# Patient Record
Sex: FEMALE | Race: WHITE | Employment: FULL TIME | URBAN - METROPOLITAN AREA
[De-identification: names, ages, dates, MRNs, and addresses within clinical notes are randomized per-mention and may not be internally consistent; named-entity substitution may affect disease eponyms.]

---

## 2017-12-20 ENCOUNTER — TELEPHONE (OUTPATIENT)
Dept: TRANSPLANT | Facility: CLINIC | Age: 32
End: 2017-12-20

## 2017-12-20 DIAGNOSIS — Z00.5 TRANSPLANT DONOR EVALUATION: Primary | ICD-10-CM

## 2017-12-20 NOTE — TELEPHONE ENCOUNTER
"[facility] BREEZE    [id number]     LIVING KIDNEY DONOR EVALUATION  Donor First Name Elle Donor MRN    Donor Last Name Sergio Completed 2017 8:35 PM   1985 Record ID [id number]  BREEZE Screen PASSED      Intended Recipient  Recipient First Name Colin Recipient MRN    Recipient Last Name Sergio Relationship Step Parent  Recipient  2002 Recipient Diagnosis    Recipient's ABO        Donor Information  Age 32 Gender Female  Height 5' 5'' Race   Weight 178 Ethnicity Not /  BMI 29.6 Preferred Language English       Required No      Blood Type O  Demographics  Home Address [address] [place] [address] # [phone]  City [place] Type Mobile  State [place] Alternate #    Zip Code [address] Type    Country [place] Preferred Contact day Mon, Fri, Thur, Wed, Tue  Email [email or web address] Preferred Contact time 1:00 PM-4:00 PM  Donor's Medical Information  Medical History Depression   Hypothyroidism Medications Fish Oil   Multivitamin   Synthroid   Wellbutrin  Surgical History Partial Thyroidectomy Allergies NKDA  Social History EtOH: Occasional (1-2 drinks/week)   Illicit Drug Use: Denies   Tobacco: Denies Self-Reported Functional Status \"I am able to participate in strenuous sports such as swimming, singles tennis, football, basketball, or skiing\"  Family Medical History Cancer (denies)   Diabetes (Grandparent)   Heart Disease (denies)   Hypertension (denies)   Kidney Disease (denies)   Kidney Stones (denies) Exercise Frequency Exercise (>3X per week)  Review of Organ Systems  Review of Systems Airway or Lungs: No   Blood Disorder: No   Cancer: No   Diabetes,Thyroid,Adrenal,Endocrine Disorder: Yes   Digestive or Liver: No   Female Health: No   Heart or Circulatory System: No   Immune Diseases: No   Kidneys and Bladder: No   Muscles,Bones,Joints: No   Neuro: No   Psych: Yes  Donor's Social Information  Marital Status  Living Accommodation Owns own " home/apartment  Level of 's or technical degree complete Living Arrangement With spouse  Employment Status Full Time Concerns: health and life insurance No  Employer [employer]  Home Concerns: job security and lost income No  Occupation        Medical Insurance Status Has medical insurance      High Risk Behavior  High Risk Behaviors Blood transfusion < 12 months. (NO)   Commercial sex < 12 months. (NO)   Illicit IV drug use < 5yrs. (NO)   Other high risk sexual contact < 12 months. (NO)  Reason for Donation  Referral Friend or Family of Tx Candidate Reason for Donation My step-son Colin Hill will need a new kidney in the near future and I would be honored to give him my kidney to prolong his life.  Permission to Disclose Inquiry Yes Patient Comments    Donor Motivation Level Highly motivated donor      PCP Contact  PCP Name Taylor Escobedo CNP  PCP [facility]  PCP [facility]  PCP Phone [phone]  Emergency Contact  First Name Ab First Name Nelly  Last Name Sergio Last Name Huong  Phone # [phone] Phone # [phone]  Phone Type   Phone Type    Relationship Spouse Relationship Sibling

## 2018-01-26 ENCOUNTER — APPOINTMENT (OUTPATIENT)
Dept: LAB | Facility: CLINIC | Age: 33
End: 2018-01-26
Payer: COMMERCIAL

## 2018-01-26 DIAGNOSIS — Z00.5 ENCOUNTER FOR EXAMINATION OF POTENTIAL DONOR OF ORGAN AND TISSUE: ICD-10-CM

## 2018-01-26 LAB
ABO GROUP (TYPE) IN BLOOD: NORMAL
BACTERIA #/AREA URNS HPF: NORMAL /HPF
BILIRUB UR QL: NEGATIVE
CLARITY UR: CLEAR
COLOR UR: YELLOW
CREAT SERPL-MCNC: 0.8 MG/DL (ref 0.6–1.2)
D AG BLD QL: NORMAL
GFR SERPL CREATININE-BSD FRML MDRD: 83 ML/MIN/1.73M*2
GLUCOSE SERPL-MCNC: 93 MG/DL (ref 70–105)
GLUCOSE UR QL: NEGATIVE MG/DL
HGB BLD-MCNC: 13.1 G/DL (ref 11.5–15.5)
HGB UR QL: NEGATIVE
KETONES UR-MCNC: NEGATIVE MG/DL
LEUKOCYTE ESTERASE UR QL STRIP: NEGATIVE
MICROALBUMIN UR-MCNC: <7 MG/L (ref 0–30)
NITRITE UR QL: NEGATIVE
PH UR: 5.5 PH
PROT UR STRIP-MCNC: NEGATIVE MG/DL
RBC #/AREA URNS HPF: NORMAL /HPF
SP GR UR: <=1.005 (ref 1–1.03)
SQUAMOUS #/AREA URNS HPF: NORMAL /HPF
UROBILINOGEN UR-MCNC: 0.2 E.U./DL
WBC #/AREA URNS HPF: NORMAL /HPF

## 2018-01-26 PROCEDURE — 82565 ASSAY OF CREATININE: CPT | Performed by: INTERNAL MEDICINE

## 2018-01-26 PROCEDURE — 82043 UR ALBUMIN QUANTITATIVE: CPT | Performed by: INTERNAL MEDICINE

## 2018-01-26 PROCEDURE — 85018 HEMOGLOBIN: CPT | Performed by: INTERNAL MEDICINE

## 2018-01-26 PROCEDURE — 81001 URINALYSIS AUTO W/SCOPE: CPT | Performed by: INTERNAL MEDICINE

## 2018-01-26 PROCEDURE — 36415 COLL VENOUS BLD VENIPUNCTURE: CPT | Performed by: INTERNAL MEDICINE

## 2018-01-26 PROCEDURE — 82947 ASSAY GLUCOSE BLOOD QUANT: CPT | Performed by: INTERNAL MEDICINE

## 2018-02-13 ENCOUNTER — DOCUMENTATION ONLY (OUTPATIENT)
Dept: TRANSPLANT | Facility: CLINIC | Age: 33
End: 2018-02-13

## 2018-02-20 ENCOUNTER — TELEPHONE (OUTPATIENT)
Dept: TRANSPLANT | Facility: CLINIC | Age: 33
End: 2018-02-20

## 2018-02-20 NOTE — TELEPHONE ENCOUNTER
Family talked things over and Elle wants to come for eval on 3/9. [place] Has not left [place] in past 3 months. Has CD.

## 2018-02-22 DIAGNOSIS — Z00.5 TRANSPLANT DONOR EVALUATION: ICD-10-CM

## 2018-03-09 ENCOUNTER — INFUSION THERAPY VISIT (OUTPATIENT)
Dept: INFUSION THERAPY | Facility: CLINIC | Age: 33
End: 2018-03-09
Attending: INTERNAL MEDICINE

## 2018-03-09 ENCOUNTER — OFFICE VISIT (OUTPATIENT)
Dept: TRANSPLANT | Facility: CLINIC | Age: 33
End: 2018-03-09
Attending: TRANSPLANT SURGERY

## 2018-03-09 ENCOUNTER — RADIANT APPOINTMENT (OUTPATIENT)
Dept: GENERAL RADIOLOGY | Facility: CLINIC | Age: 33
End: 2018-03-09
Attending: INTERNAL MEDICINE

## 2018-03-09 ENCOUNTER — ALLIED HEALTH/NURSE VISIT (OUTPATIENT)
Dept: TRANSPLANT | Facility: CLINIC | Age: 33
End: 2018-03-09
Attending: TRANSPLANT SURGERY

## 2018-03-09 ENCOUNTER — DOCUMENTATION ONLY (OUTPATIENT)
Dept: TRANSPLANT | Facility: CLINIC | Age: 33
End: 2018-03-09

## 2018-03-09 ENCOUNTER — OFFICE VISIT (OUTPATIENT)
Dept: NEPHROLOGY | Facility: CLINIC | Age: 33
End: 2018-03-09
Attending: TRANSPLANT SURGERY

## 2018-03-09 ENCOUNTER — RADIANT APPOINTMENT (OUTPATIENT)
Dept: CT IMAGING | Facility: CLINIC | Age: 33
End: 2018-03-09
Attending: INTERNAL MEDICINE

## 2018-03-09 VITALS
WEIGHT: 183.5 LBS | HEIGHT: 65 IN | RESPIRATION RATE: 20 BRPM | DIASTOLIC BLOOD PRESSURE: 71 MMHG | SYSTOLIC BLOOD PRESSURE: 118 MMHG | OXYGEN SATURATION: 100 % | BODY MASS INDEX: 30.57 KG/M2 | HEART RATE: 77 BPM | TEMPERATURE: 97.9 F

## 2018-03-09 VITALS — SYSTOLIC BLOOD PRESSURE: 107 MMHG | DIASTOLIC BLOOD PRESSURE: 72 MMHG

## 2018-03-09 VITALS — SYSTOLIC BLOOD PRESSURE: 110 MMHG | DIASTOLIC BLOOD PRESSURE: 71 MMHG

## 2018-03-09 DIAGNOSIS — Z00.5 TRANSPLANT DONOR EVALUATION: Primary | ICD-10-CM

## 2018-03-09 DIAGNOSIS — Z00.5 TRANSPLANT DONOR EVALUATION: ICD-10-CM

## 2018-03-09 LAB
ABO + RH BLD: NORMAL
ABO + RH BLD: NORMAL
ALBUMIN SERPL-MCNC: 3.8 G/DL (ref 3.4–5)
ALBUMIN UR-MCNC: NEGATIVE MG/DL
ALP SERPL-CCNC: 45 U/L (ref 40–150)
ALT SERPL W P-5'-P-CCNC: 34 U/L (ref 0–50)
ANION GAP SERPL CALCULATED.3IONS-SCNC: 6 MMOL/L (ref 3–14)
APPEARANCE UR: CLEAR
APTT PPP: 27 SEC (ref 22–37)
AST SERPL W P-5'-P-CCNC: 29 U/L (ref 0–45)
B-HCG SERPL-ACNC: <1 IU/L (ref 0–5)
BILIRUB SERPL-MCNC: 0.6 MG/DL (ref 0.2–1.3)
BILIRUB UR QL STRIP: NEGATIVE
BLD GP AB SCN SERPL QL: NORMAL
BLOOD BANK CMNT PATIENT-IMP: NORMAL
BUN SERPL-MCNC: 15 MG/DL (ref 7–30)
C3 SERPL-MCNC: 90 MG/DL (ref 76–169)
C4 SERPL-MCNC: 17 MG/DL (ref 15–50)
CALCIUM SERPL-MCNC: 8.6 MG/DL (ref 8.5–10.1)
CHLORIDE SERPL-SCNC: 103 MMOL/L (ref 94–109)
CHOLEST SERPL-MCNC: 135 MG/DL
CMV IGG SERPL QL IA: 5.5 AI (ref 0–0.8)
CO2 SERPL-SCNC: 27 MMOL/L (ref 20–32)
COLOR UR AUTO: COLORLESS
CREAT SERPL-MCNC: 0.78 MG/DL (ref 0.52–1.04)
CREAT UR-MCNC: 20 MG/DL
EBV VCA IGG SER QL IA: 7.3 AI (ref 0–0.8)
ERYTHROCYTE [DISTWIDTH] IN BLOOD BY AUTOMATED COUNT: 12.4 % (ref 10–15)
GFR SERPL CREATININE-BSD FRML MDRD: 86 ML/MIN/1.7M2
GLUCOSE SERPL-MCNC: 82 MG/DL (ref 70–99)
GLUCOSE UR STRIP-MCNC: NEGATIVE MG/DL
HBA1C MFR BLD: 5.3 % (ref 4.3–6)
HBV CORE AB SERPL QL IA: NONREACTIVE
HBV SURFACE AB SERPL IA-ACNC: 39.98 M[IU]/ML
HBV SURFACE AG SERPL QL IA: NONREACTIVE
HCT VFR BLD AUTO: 38.4 % (ref 35–47)
HCV AB SERPL QL IA: NONREACTIVE
HDLC SERPL-MCNC: 66 MG/DL
HGB BLD-MCNC: 12.7 G/DL (ref 11.7–15.7)
HGB UR QL STRIP: NEGATIVE
HIV 1+2 AB+HIV1 P24 AG SERPL QL IA: NONREACTIVE
INR PPP: 1.01 (ref 0.86–1.14)
INTERPRETATION ECG - MUSE: NORMAL
KETONES UR STRIP-MCNC: NEGATIVE MG/DL
LDLC SERPL CALC-MCNC: 63 MG/DL
LEUKOCYTE ESTERASE UR QL STRIP: NEGATIVE
MCH RBC QN AUTO: 31.8 PG (ref 26.5–33)
MCHC RBC AUTO-ENTMCNC: 33.1 G/DL (ref 31.5–36.5)
MCV RBC AUTO: 96 FL (ref 78–100)
MICROALBUMIN UR-MCNC: <5 MG/L
MICROALBUMIN/CREAT UR: NORMAL MG/G CR (ref 0–25)
MUCOUS THREADS #/AREA URNS LPF: PRESENT /LPF
NITRATE UR QL: NEGATIVE
NONHDLC SERPL-MCNC: 69 MG/DL
PH UR STRIP: 6 PH (ref 5–7)
PHOSPHATE SERPL-MCNC: 3.2 MG/DL (ref 2.5–4.5)
PLATELET # BLD AUTO: 247 10E9/L (ref 150–450)
POTASSIUM SERPL-SCNC: 4 MMOL/L (ref 3.4–5.3)
PROT SERPL-MCNC: 7.1 G/DL (ref 6.8–8.8)
PROT UR-MCNC: <0.05 G/L
PROT/CREAT 24H UR: NORMAL G/G CR (ref 0–0.2)
RBC # BLD AUTO: 3.99 10E12/L (ref 3.8–5.2)
RBC #/AREA URNS AUTO: 0 /HPF (ref 0–2)
SODIUM SERPL-SCNC: 137 MMOL/L (ref 133–144)
SOURCE: ABNORMAL
SP GR UR STRIP: 1 (ref 1–1.03)
SPECIMEN EXP DATE BLD: NORMAL
T PALLIDUM IGG+IGM SER QL: NEGATIVE
TRIGL SERPL-MCNC: 32 MG/DL
URATE SERPL-MCNC: 5 MG/DL (ref 2.6–6)
UROBILINOGEN UR STRIP-MCNC: 0 MG/DL (ref 0–2)
WBC # BLD AUTO: 6.2 10E9/L (ref 4–11)
WBC #/AREA URNS AUTO: 0 /HPF (ref 0–5)

## 2018-03-09 PROCEDURE — 86900 BLOOD TYPING SEROLOGIC ABO: CPT | Performed by: INTERNAL MEDICINE

## 2018-03-09 PROCEDURE — 85730 THROMBOPLASTIN TIME PARTIAL: CPT | Performed by: INTERNAL MEDICINE

## 2018-03-09 PROCEDURE — 82542 COL CHROMOTOGRAPHY QUAL/QUAN: CPT | Performed by: INTERNAL MEDICINE

## 2018-03-09 PROCEDURE — 84156 ASSAY OF PROTEIN URINE: CPT | Performed by: INTERNAL MEDICINE

## 2018-03-09 PROCEDURE — 87340 HEPATITIS B SURFACE AG IA: CPT | Performed by: INTERNAL MEDICINE

## 2018-03-09 PROCEDURE — 86160 COMPLEMENT ANTIGEN: CPT | Performed by: INTERNAL MEDICINE

## 2018-03-09 PROCEDURE — 86901 BLOOD TYPING SEROLOGIC RH(D): CPT | Performed by: INTERNAL MEDICINE

## 2018-03-09 PROCEDURE — 85027 COMPLETE CBC AUTOMATED: CPT | Performed by: INTERNAL MEDICINE

## 2018-03-09 PROCEDURE — 86038 ANTINUCLEAR ANTIBODIES: CPT | Performed by: INTERNAL MEDICINE

## 2018-03-09 PROCEDURE — 86704 HEP B CORE ANTIBODY TOTAL: CPT | Performed by: INTERNAL MEDICINE

## 2018-03-09 PROCEDURE — 80053 COMPREHEN METABOLIC PANEL: CPT | Performed by: INTERNAL MEDICINE

## 2018-03-09 PROCEDURE — 83036 HEMOGLOBIN GLYCOSYLATED A1C: CPT | Performed by: INTERNAL MEDICINE

## 2018-03-09 PROCEDURE — 86480 TB TEST CELL IMMUN MEASURE: CPT | Performed by: INTERNAL MEDICINE

## 2018-03-09 PROCEDURE — 81001 URINALYSIS AUTO W/SCOPE: CPT | Performed by: INTERNAL MEDICINE

## 2018-03-09 PROCEDURE — 86850 RBC ANTIBODY SCREEN: CPT | Performed by: INTERNAL MEDICINE

## 2018-03-09 PROCEDURE — 82043 UR ALBUMIN QUANTITATIVE: CPT | Performed by: INTERNAL MEDICINE

## 2018-03-09 PROCEDURE — 25000128 H RX IP 250 OP 636: Mod: JW,ZF | Performed by: INTERNAL MEDICINE

## 2018-03-09 PROCEDURE — 86803 HEPATITIS C AB TEST: CPT | Performed by: INTERNAL MEDICINE

## 2018-03-09 PROCEDURE — 86780 TREPONEMA PALLIDUM: CPT | Performed by: INTERNAL MEDICINE

## 2018-03-09 PROCEDURE — 40000866 ZZHCL STATISTIC HIV 1/2 ANTIGEN/ANTIBODY PRETRANSPLANT ONLY: Performed by: INTERNAL MEDICINE

## 2018-03-09 PROCEDURE — 80061 LIPID PANEL: CPT | Performed by: INTERNAL MEDICINE

## 2018-03-09 PROCEDURE — 86665 EPSTEIN-BARR CAPSID VCA: CPT | Performed by: INTERNAL MEDICINE

## 2018-03-09 PROCEDURE — 85610 PROTHROMBIN TIME: CPT | Performed by: INTERNAL MEDICINE

## 2018-03-09 PROCEDURE — 84702 CHORIONIC GONADOTROPIN TEST: CPT | Performed by: INTERNAL MEDICINE

## 2018-03-09 PROCEDURE — 84550 ASSAY OF BLOOD/URIC ACID: CPT | Performed by: INTERNAL MEDICINE

## 2018-03-09 PROCEDURE — 84100 ASSAY OF PHOSPHORUS: CPT | Performed by: INTERNAL MEDICINE

## 2018-03-09 PROCEDURE — 86706 HEP B SURFACE ANTIBODY: CPT | Performed by: INTERNAL MEDICINE

## 2018-03-09 PROCEDURE — 86644 CMV ANTIBODY: CPT | Performed by: INTERNAL MEDICINE

## 2018-03-09 RX ORDER — IOPAMIDOL 755 MG/ML
100 INJECTION, SOLUTION INTRAVASCULAR ONCE
Status: COMPLETED | OUTPATIENT
Start: 2018-03-09 | End: 2018-03-09

## 2018-03-09 RX ORDER — ALPRAZOLAM 0.5 MG
TABLET ORAL
COMMUNITY
Start: 2017-11-22

## 2018-03-09 RX ORDER — LEVOTHYROXINE SODIUM 112 UG/1
125 TABLET ORAL DAILY
Status: ON HOLD | COMMUNITY
End: 2019-05-22

## 2018-03-09 RX ADMIN — IOPAMIDOL 100 ML: 755 INJECTION, SOLUTION INTRAVASCULAR at 12:24

## 2018-03-09 RX ADMIN — IOHEXOL 5 ML: 300 INJECTION, SOLUTION INTRAVENOUS at 07:43

## 2018-03-09 ASSESSMENT — PAIN SCALES - GENERAL: PAINLEVEL: NO PAIN (0)

## 2018-03-09 NOTE — MR AVS SNAPSHOT
After Visit Summary   3/9/2018    Elle Hill    MRN: [id number]           Patient Information     Date Of Birth          1985        Visit Information        Provider Department      3/9/2018 7:00 AM UC 50 ATC; UC SPEC INFUSION [facility] Specialty and Procedure        Today's Diagnoses     Transplant donor evaluation    -  1       Follow-ups after your visit        Who to contact     If you have questions or need follow up information about today's clinic visit or your schedule please contact [facility] SPECIALTY AND PROCEDURE directly at [phone].  Normal or non-critical lab and imaging results will be communicated to you by Angstrohart, letter or phone within 4 business days after the clinic has received the results. If you do not hear from us within 7 days, please contact the clinic through DeerTech or phone. If you have a critical or abnormal lab result, we will notify you by phone as soon as possible.  Submit refill requests through DeerTech or call your pharmacy and they will forward the refill request to us. Please allow 3 business days for your refill to be completed.          Additional Information About Your Visit        MyChart Information     DeerTech gives you secure access to your electronic health record. If you see a primary care provider, you can also send messages to your care team and make appointments. If you have questions, please call your primary care clinic.  If you do not have a primary care provider, please call [phone] and they will assist you.        Care EveryWhere ID     This is your Care EveryWhere ID. This could be used by other organizations to access your [facility] medical records  [id number]         Blood Pressure from Last 3 Encounters:   03/09/18 110/71   03/09/18 107/72   03/09/18 118/71    Weight from Last 3 Encounters:   03/09/18 83.2 kg (183 lb 8 oz)              We Performed the Following     Iohexol         Primary Care Provider Fax #    Provider Not In System [phone]                Equal Access to Services     MILTONGAB CLAUDINE : Hadkalyani Hu, nicole veras, janie oglesbymaerin trent, ruby claytonmaxsylvia fitzgerald . So [facility] [phone].    ATENCIÓN: Si habla español, tiene a jefferson disposición servicios gratuitos de asistencia lingüística. Llame al [phone].    We comply with applicable federal civil rights laws and [place] laws. We do not discriminate on the basis of race, color, national origin, age, disability, sex, sexual orientation, or gender identity.            Thank you!     Thank you for choosing [facility] SPECIALTY AND PROCEDURE  for your care. Our goal is always to provide you with excellent care. Hearing back from our patients is one way we can continue to improve our services. Please take a few minutes to complete the written survey that you may receive in the mail after your visit with us. Thank you!             Your Updated Medication List - Protect others around you: Learn how to safely use, store and throw away your medicines at www.disposemymeds.org.          This list is accurate as of 3/9/18  3:45 PM.  Always use your most recent med list.                   Brand Name Dispense Instructions for use Diagnosis    ALPRAZolam 0.5 MG tablet    XANAX     TAKE 1 TABLET BY MOUTH UP THREE TIMES DAILY IN 24 HOURS AS NEEDED FOR ANXIETY        SYNTHROID 112 MCG tablet   Generic drug:  levothyroxine

## 2018-03-09 NOTE — PROGRESS NOTES
NLDA paperwork was emailed to Elle tobias.  Recipient lives in their household so they will provide both, donor and recipient worksheets and I will complete the application process once forms are completed and returned to me (and if she has been approved).

## 2018-03-09 NOTE — PROGRESS NOTES
Outpatient MNT: Kidney Donor Evaluation    Current BMI: 30.5  BMI is outside criteria of <30 for kidney donation  Current Weight: 183 lbs  Goal Weight <180 lbs  Ideal weight loss needed: 3 lbs    8 Year Risk of Diabetes  </= 3%     Time Spent: 15 minutes  Visit Type: Initial  Referring Physician: Dr Pantoja  Pt accompanied by: her , Ab     Nutrition Assessment  Pt reports having mono last year and not working out as much and eating less healthily, leading to ~20 lb weight gain. As of January, she restarted her healthy eating plan.     Vitamins, Supplements, Pertinent Meds: fish oil, magnesium, super B complex, calcium with vitamin D  Herbal Medicines/Supplements: none    Diet Recall  Breakfast Eggs with turkey sausage or oats or frozen BF s/w ([facility]y [place])   Lunch Boiled egg with pistachios and cheese or an occasional s/w (trying to reduce bread intake)   Dinner chix with veggies or steak with veggies or steak caesar salad    Snacks AM - apple, orange, strawberries; PM - nuts, fruit   Beverages Water, coffee, tea, smoothie in the summer with Greek yogurt, frozen fruit, protein powder, cinnamon    Alcohol 2-3 glasses of wine/week    Dining out 2x/week     Physical Activity  5-6 days/week of either strength training or running      Anthropometrics  Height:   65 in   BMI:    30.5    Weight Status:Obesity Grade I BMI 30-34.9   Weight:  183 lbs            IBW (lb): 125  % IBW: 146    Wt Hx: Pt reports -178 and that her current weight is higher today being constipated all week. ~1 year ago prior to having mono pt reports weight was 158 lbs.     Adj/dosing BW: 140 lbs/63 kg       Labs  Recent Labs   Lab Test  03/09/18   0655   CHOL  135   HDL  66   LDL  63   TRIG  32       FBG = 82  A1C = 5.3    Prediction of Incident Diabetes Mellitus in Middle-aged Adults: The [place] Offspring Study  Jermaine Newman MD; James B. Meigs, MD, MPH; Sharon Woods, PhD; Yazmin Ragland MD, MPH; Dameon Kim MD;  Wayne Elliott Sr,   PhD  Pt's estimated risk for T2DM (per Table 6 above)  Pt received points for the following criteria: BMI>30  Total points: 5  8-Year risk of T2DM: </= 3%    Estimated Nutrition Needs  Energy  1260    (20 kcal/kg for desired weight loss)        Protein  50-63    (0.8-1 g/kg for maintenance)           Fluid  1 ml/kcal or per MD     Nutrition Diagnosis  Food and nutrition related knowledge deficit r/t pre transplant donor eval pt AEB pt verbalized not hearing pre/post transplant diet guidelines.    Nutrition Intervention  Nutrition education provided:  Discussed weight criteria for donation.   Reviewed overall healthy diet guidelines for pre and post kidney donation. Discussed maintenance of a healthy weight, Na+ intake <3000 mg/day, and avoidance of herbal supplements post donation d/t unknown effects on the kidney.    Patient Understanding: Pt verbalized understanding of education provided.  Expected Compliance: Good  Follow-Up Plans: PRN     Nutrition Goals  Pt to verbalize understanding of education provided     Provided pt with contact info.   Clarissa Hernadez RD, LD  [facility] [phone]

## 2018-03-09 NOTE — MR AVS SNAPSHOT
After Visit Summary   3/9/2018    Elle Hill    MRN: [id number]           Patient Information     Date Of Birth          1985        Visit Information        Provider Department      3/9/2018 8:30 AM Marci Ricardo RN [facility] Solid Organ Transplant        Today's Diagnoses     Transplant donor evaluation    -  1       Follow-ups after your visit        Your next 10 appointments already scheduled     Mar 09, 2018 11:30 AM CST   (Arrive by 11:15 AM)   Kidney Donor Evaluation with Quang Pantoja MD   [facility] Solid Organ Transplant ([facility])    [address] [place] [address]  [address]  [facility] [address]   [phone]            Mar 09, 2018 12:30 PM CST   (Arrive by 12:15 PM)   XR CHEST 2 VIEWS with UCXR1   [facility] Xray ([facility])    [address] [place] [id number] [phone]           Please bring a list of your current medicines to your exam. (Include vitamins, minerals and over-thecounter medicines.) Leave your valuables at home.  Tell your doctor if there is a chance you may be pregnant.  You do not need to do anything special for this exam.            Mar 09, 2018 12:40 PM CST   (Arrive by 12:25 PM)   CT RENAL ANGIO with UCCT2   [facility] CT ([facility])    [address] [place] [id number] [phone]           Please bring any scans or X-rays taken at other hospitals, if similar tests were done. Also bring a list of your medicines, including vitamins, minerals and over-the-counter drugs. It is safest to leave personal items at home.  Be sure to tell your doctor:   If you have any allergies.   If there s any chance you are pregnant.   If you are breastfeeding.    If you have diabetes as your medication may need to be adjusted for this exam.  You will have contrast for this exam. To prepare:   Do not eat or drink for 2  hours before your exam. If you need to take medicine, you may take it with small sips of water. (We may ask you to take liquid medicine as well.)   The day before your exam, drink extra fluids at least six 8-ounce glasses (unless your doctor tells you to restrict your fluids).  Patients over 70 or patients with diabetes or kidney problems:   If you haven t had a blood test (creatinine test) within the last 30 days, the Cardiologist/Radiologist may require you to get this test prior to your exam.  Please wear loose clothing, such as a sweat suit or jogging clothes. Avoid snaps, zippers and other metal. We may ask you to undress and put on a hospital gown.  If you have any questions, please call the Imaging Department where you will have your exam.              Future tests that were ordered for you today     Open Future Orders        Priority Expected Expires Ordered    Complement C3 Add-On  4/8/2018 3/9/2018    Complement C4 Add-On  4/8/2018 3/9/2018            Who to contact     If you have questions or need follow up information about today's clinic visit or your schedule please contact [facility] SOLID ORGAN TRANSPLANT directly at [phone].  Normal or non-critical lab and imaging results will be communicated to you by DramaFeverhart, letter or phone within 4 business days after the clinic has received the results. If you do not hear from us within 7 days, please contact the clinic through DramaFeverhart or phone. If you have a critical or abnormal lab result, we will notify you by phone as soon as possible.  Submit refill requests through iStoryTime or call your pharmacy and they will forward the refill request to us. Please allow 3 business days for your refill to be completed.          Additional Information About Your Visit        iStoryTime Information     iStoryTime gives you secure access to your electronic health record. If you see a primary care provider, you can also send messages to your care team and make appointments. If  you have questions, please call your primary care clinic.  If you do not have a primary care provider, please call [phone] and they will assist you.        Care EveryWhere ID     This is your Care EveryWhere ID. This could be used by other organizations to access your [facility] medical records  [id number]         Blood Pressure from Last 3 Encounters:   03/09/18 107/72   03/09/18 118/71    Weight from Last 3 Encounters:   03/09/18 83.2 kg (183 lb 8 oz)              Today, you had the following     No orders found for display       Primary Care Provider Fax #    Provider Not In System [phone]                Equal Access to Services     [facility]: Hadii bird Hu, wamarionda rito, janie kaalmada miley, ruby fitzgerald . So [facility] [phone].    ATENCIÓN: Si habla español, tiene a jefferson disposición servicios gratuitos de asistencia lingüística. Llame al [phone].    We comply with applicable federal civil rights laws and [place] laws. We do not discriminate on the basis of race, color, national origin, age, disability, sex, sexual orientation, or gender identity.            Thank you!     Thank you for choosing [facility] SOLID ORGAN TRANSPLANT  for your care. Our goal is always to provide you with excellent care. Hearing back from our patients is one way we can continue to improve our services. Please take a few minutes to complete the written survey that you may receive in the mail after your visit with us. Thank you!             Your Updated Medication List - Protect others around you: Learn how to safely use, store and throw away your medicines at www.disposemymeds.org.          This list is accurate as of 3/9/18 10:47 AM.  Always use your most recent med list.                   Brand Name Dispense Instructions for use Diagnosis    ALPRAZolam 0.5 MG tablet    XANAX     TAKE 1 TABLET BY MOUTH UP THREE TIMES DAILY IN 24 HOURS AS NEEDED FOR ANXIETY         SYNTHROID 112 MCG tablet   Generic drug:  levothyroxine

## 2018-03-09 NOTE — MR AVS SNAPSHOT
After Visit Summary   3/9/2018    Elle Hill    MRN: [id number]           Patient Information     Date Of Birth          1985        Visit Information        Provider Department      3/9/2018 8:00 AM Clarissa Hernadez RD [facility] Solid Organ Transplant        Today's Diagnoses     Transplant donor evaluation    -  1       Follow-ups after your visit        Your next 10 appointments already scheduled     Mar 09, 2018  8:30 AM CST   (Arrive by 8:15 AM)   SOT CARE COORDINATOR EVAL with Marci Ricardo RN   [facility] Solid Organ Transplant ([facility])    [address]  [address]  [facility] [address]   [phone]            Mar 09, 2018  9:00 AM CST   (Arrive by 8:45 AM)   SOT SOCIAL WORK EVAL with LIBRADO [facility] Solid Organ Transplant ([facility])    [address] [place] [address]  [address]  [facility] [address]   [phone]            Mar 09, 2018 10:00 AM CST   (Arrive by 9:30 AM)   Kidney Donor Evaluation with Jimbo John MD   [facility] Nephrology ([facility])    [address]  [address]  [facility] [address]   [phone]            Mar 09, 2018 11:30 AM CST   (Arrive by 11:15 AM)   Kidney Donor Evaluation with Quang Pantoja MD   [facility] Solid Organ Transplant ([facility])    [address]  [address]  [facility] [address]   [phone]            Mar 09, 2018 12:30 PM CST   (Arrive by 12:15 PM)   XR CHEST 2 VIEWS with UCXR1   [facility] [place] Xray ([facility])    [address]  1st Floor  [facility] [address] [phone]           Please bring a list of your current medicines to your exam. (Include vitamins, minerals and over-thecounter medicines.) Leave your valuables at home.  Tell your doctor if there is a chance you may be pregnant.  You do not need to do anything special  for this exam.            Mar 09, 2018 12:40 PM CST   (Arrive by 12:25 PM)   CT RENAL ANGIO with UCCT2   [facility] CT ([facility])    [address]  [address] [facility] [address] [phone]           Please bring any scans or X-rays taken at other hospitals, if similar tests were done. Also bring a list of your medicines, including vitamins, minerals and over-the-counter drugs. It is safest to leave personal items at home.  Be sure to tell your doctor:   If you have any allergies.   If there s any chance you are pregnant.   If you are breastfeeding.    If you have diabetes as your medication may need to be adjusted for this exam.  You will have contrast for this exam. To prepare:   Do not eat or drink for 2 hours before your exam. If you need to take medicine, you may take it with small sips of water. (We may ask you to take liquid medicine as well.)   The day before your exam, drink extra fluids at least six 8-ounce glasses (unless your doctor tells you to restrict your fluids).  Patients over 70 or patients with diabetes or kidney problems:   If you haven t had a blood test (creatinine test) within the last 30 days, the Cardiologist/Radiologist may require you to get this test prior to your exam.  Please wear loose clothing, such as a sweat suit or jogging clothes. Avoid snaps, zippers and other metal. We may ask you to undress and put on a hospital gown.  If you have any questions, please call the Imaging Department where you will have your exam.              Who to contact     If you have questions or need follow up information about today's clinic visit or your schedule please contact [facility] SOLID ORGAN TRANSPLANT directly at [phone].  Normal or non-critical lab and imaging results will be communicated to you by MyChart, letter or phone within 4 business days after the clinic has received the results. If you do not hear from us within 7 days,  please contact the clinic through Sviral or phone. If you have a critical or abnormal lab result, we will notify you by phone as soon as possible.  Submit refill requests through Sviral or call your pharmacy and they will forward the refill request to us. Please allow 3 business days for your refill to be completed.          Additional Information About Your Visit        WyleharmyThings Information     Sviral gives you secure access to your electronic health record. If you see a primary care provider, you can also send messages to your care team and make appointments. If you have questions, please call your primary care clinic.  If you do not have a primary care provider, please call [phone] and they will assist you.        Care EveryWhere ID     This is your Care EveryWhere ID. This could be used by other organizations to access your [facility] medical records  [id number]         Blood Pressure from Last 3 Encounters:   03/09/18 118/71    Weight from Last 3 Encounters:   03/09/18 83.2 kg (183 lb 8 oz)              Today, you had the following     No orders found for display       Primary Care Provider Fax #    Provider Not In System [phone]                Equal Access to Services     : Hadii bird Hu, waaxda lubarrie, qaybta kaalmaerin trent, ruby fitzgerald . So [facility] [phone].    ATENCIÓN: Si habla español, tiene a jefferson disposición servicios gratuitos de asistencia lingüística. Llame al [phone].    We comply with applicable federal civil rights laws and [place] laws. We do not discriminate on the basis of race, color, national origin, age, disability, sex, sexual orientation, or gender identity.            Thank you!     Thank you for choosing [facility] SOLID ORGAN TRANSPLANT  for your care. Our goal is always to provide you with excellent care. Hearing back from our patients is one way we can continue to improve our services. Please take a few  minutes to complete the written survey that you may receive in the mail after your visit with us. Thank you!             Your Updated Medication List - Protect others around you: Learn how to safely use, store and throw away your medicines at www.disposemymeds.org.          This list is accurate as of 3/9/18  8:24 AM.  Always use your most recent med list.                   Brand Name Dispense Instructions for use Diagnosis    ALPRAZolam 0.5 MG tablet    XANAX     TAKE 1 TABLET BY MOUTH UP THREE TIMES DAILY IN 24 HOURS AS NEEDED FOR ANXIETY        SYNTHROID 112 MCG tablet   Generic drug:  levothyroxine

## 2018-03-09 NOTE — DISCHARGE INSTRUCTIONS

## 2018-03-09 NOTE — LETTER
3/9/2018       RE: Elle Hill  [address] [address]     Dear Colleague,    Thank you for referring your patient, Elle Hill, to the [facility] SOLID ORGAN TRANSPLANT at [facility]. Please see a copy of my visit note below.    Patient attended all appointments and completed all scheduled tests.  Patient to follow up with Transplant Coordinator.  Patient stated understanding and has contact numbers.      Again, thank you for allowing me to participate in the care of your patient.      Sincerely,    Transplant Evaluation Resource

## 2018-03-09 NOTE — NURSING NOTE
Jett Almeida  in clinic [facility] kidney donor evaluation.    Came with  on 2018  Has offered to be donor to step son  Discussed surgery hospitalization and recovery.  Know when and how to obtain evaluation results and the process for scheduling surgery.  Reviewed SRTR datasheet.  Signed consent for donor evaluation.  Donor Signed BABATUNDE.  Requested routine cancer screening tests:    Pap and information related to throidectomy  Questions answered.  Approx. time spent:  30 minutes  Donor has medical insurance:  Yes      Living Kidney Donor Consent per OPTN Policy 14.3 for Transplant Coordinators    Written assurance has been obtained from the patient that the donor:   1) Is willing to donate  2) Is free from inducement and coercion  3) Has been informed that the donor may decline to donate at any time  4) Has been informed that transplant centers must:     A) Offer donors an opportunity to discontinue the donor consent or evaluation process in a way that is protected and confidential    B) Provide an independent donor advocate (CHELSEY) to assist the potential donor during this process    The following was presented in a language in which donor is able to engage in meaningful dialogue and was reviewed and discussed with the patient by the transplant coordinator and the physician.     The following has been provided:   Education and instruction about all phases of the living donation process includin) Consent  2) The donor must undergo medical and psychosocial evaluations  3) Disclosure that the recovery hospital will take all reasonable precautions to provide confidentiality for the donor/recipient  4) Disclosure that it is a federal crime for any person to knowingly acquire, obtain or otherwise transfer any human organ for valuable consideration  5) The transplant hospital may refuse the potential donor, and the donor could be evaluated by another transplant program with different selection criteria  6)  Data from the most recent SRTR center-specific reports:     A) National 1-year patient and graft survival rates    B) Hospital s 1-year patient and graft survival rates    C) Notification about all CMS outcome requirements not being met by the recovery and recipient s hospitals    The following has been provided:   1) Education about expected post-donation kidney function and how chronic kidney disease and end-stage renal disease might potentially impact the donor in the future to include:    A) On average, donors will have 25-35% permanent loss of kidney function at donation    B) Baseline risk of End Stage Renal Disease (ESRD)  does not exceed that of members of general population with same demographic profile    C) Donor risks must be interpreted in light of known epidemiology of both Chronic Kidney Disease (CKD) or ESRD    D) CKD generally develops in midlife (40-50 years old)    E) ESRD generally develops after age 60    F) Medical evaluation of young potential donor cannot predict lifetime risk  2) Donors may be at higher risk for CKD if they sustain damage to the remaining kidney. 3) Development of CKD and progression to ESRD may be more rapid with only 1 kidney  4) Dialysis is required when reaching ESRD  5) Current practice prioritizes prior living kidney donors who became kidney transplant candidates  6) Alternate procedures or courses of treatment for the recipient, including  donor transplant    A) A  donor kidney may become available for the recipient before donor evaluation is complete or transplant occurs    B) Any transplant candidate may have risk factors for increased morbidity or mortality that are not disclosed to the potential donor  7) The patient will receive a thorough medical and psychosocial evaluation  8) Health information obtained during the evaluation is subject to the same regulations as all records and could reveal conditions that must be reported to local, state, or  federal public health authorities  9) The [facility] [place], is required to report living donor follow up information at 6, 12, and 24 months  10) Potential donors must commit to post operative follow up testing coordinated by the [facility]  11) Any infectious disease or malignancy pertinent to acute recipient care discovered during the potential donor s first two years of follow up care:    A) Will be disclosed to the donor    B) May need to be reported to local, state or federal public health authorities    C) Will be disclosed to their recipient s transplant center, and    D) Will be reported through the OPT Improving Patient Safety Portal    Disclosure has been provided that these risks may be transient or permanent & include but are not limited to potential medical or surgical risks:    Death    Scars, pain, fatigue, and other consequences typical of any surgical procedure    Decreased kidney function    Abdominal or bowel symptoms such as bloating and nausea, and developing bowel obstruction    Kidney failure and the need for dialysis or kidney transplant for the donor  Impact of obesity, hypertension or other donor-specific medical conditions on morbidity and mortality of the potential donor    IFTIKHAR Smith, RN     Transplant Coordinator  Brmswv-[phone]  [phone]

## 2018-03-09 NOTE — PROGRESS NOTES
"Iohexol Timed Test Nursing Note    Elle Hill comes to [facility] today for a Iohexol GFR Timed test.   Orders from Dr. John were completed.    Progress Note  Height: 5'5\"  Weight: 83.2 kg  Age: 33  Gender: female    The following information was verified with the patient:  *Female patients are not pregnant or could not have become recently pregnant: No, not pregnant  *Is there a history of allergy (skin rash, swelling, ect) to :   a. Iodine (except skin reactions to Betadine): NO   b. Intravenous radio-contrast agents: NO   c. Seafood: NO     RN provided patient with educational handout regarding timed test. YES    Medication administered :   Iohexol (Omnipaque 300 mg Iodine/ ml concentration) 5 mls.  Site administered PIV Right AC  Start [id number]  Stop [id number]    (2 Hour) 0945    (4 hour) 1145    Drug Waste Record    Drug Name: iohexol  Dose: 5 ml  Route administered: IV  NDC #: 0407-1413-10  Amount of waste(mL):5 ml  Reason for waste: Single use vial        Patient tolerated the procedure well    Vitals were reviewed   There were no vitals taken for this visit.    Discharge Plan    Discharge instructions reviewed with patient: YES  Discharge papers printed and given to patient: YES  Patient/Representative verbalized understanding, all questions answered: YES    Discharged from unit at 0746 with whom: self and family to next appointment.    Adela Mccoy RN     "

## 2018-03-09 NOTE — LETTER
3/9/2018       RE: Elle Hill  [address] [address]     Dear Colleague,    Thank you for referring your patient, Elle Hill, to the [facility] NEPHROLOGY at [facility]. Please see a copy of my visit note below.    Assessment and Plan:  1. Prospective kidney transplant donor with a couple of issues to be addressed prior to donation. Patient's blood pressure is acceptable at this visit, kidney function appears to be acceptable with Iohexol pending, and urinalysis is bland.  2. Overweight - patient is slightly above BMI goal of 30 or less and recommend increased exercise and watching caloric intake for weight loss.  3. H/o positive ENOC - unclear significance.  Will repeat ENOC and also obtain complement C3 and C4 levels.  4. Constipation - occasional symptoms.  Would recommend senna and/or docusate as needed.    Discussed the risks of donating a kidney, including the surgical risk and the possible risks of living with one kidney.    Education about expected post-donation kidney function and how chronic kidney disease (CKD) and end stage kidney disease (ESKD) might potentially impact the donor in the future, include, but not limited to:       - On average, donors will have 25-35% permanent loss of kidney function at donation.       - Baseline risk of ESKD may slightly exceed that of members of the general population with the same demographic profile.       - Donor risks must be interpreted in light of known epidemiology of both CKD or ESKD, such as that CKD generally develops in midlife (40-50 years old) and ESKD generally develops after age 60.       - Medical evaluation of young potential donors cannot predict lifetime risk of CKD or ESKD.       - Donors may be at higher risk for CKD if they sustain damage to the remaining kidney.       - Development of CKD and progression of ESKD may be more rapid with only 1 kidney.       - Some type of kidney replacement  therapy, either kidney transplant or dialysis, is required when reaching ESKD.    Potential medical or surgical risks include, but not limited to:       - Death.       - Scars, pain, fatigue, and other consequences typical of any surgical procedure.       - Decreased kidney function.       - Abdominal or bowel symptoms, such as bloating and nausea, and developing bowel obstruction.       - Kidney failure (ESKD) and the need for a kidney transplant or dialysis for the donor.       - Impact of obesity, hypertension, or other donor-specific medical conditions on morbidity and mortality of the potential donor.    Patients overall evaluation will be discussed with the transplant team and a final recommendation on the patients' suitability to be a kidney transplant donor will be made at that time.    Consult:  Elle Hill was seen in consultation at the request of Dr. Terry Pearl for evaluation as a potential kidney transplant donor.    Reason for Visit:  Elle Hill is a 33 year old female who presents for a kidney donor evaluation.  Patient would like to donate to Colin Hill, her step son.    HPI:    Patient reports feeling good overall with minimal medical complaints.  Her energy level is good and has been normal.  She is active and does get some exercise.  Denies any chest pain or shortness of breath with exertion.  Appetite is good and weight has been stable.  No nausea, vomiting or diarrhea, but some constipation.  Patient reports constipation has been an issue for her off and on.  No fever, sweats or chills.  No leg swelling.  No pain or burning with urination.    Of note, patient was diagnosed with mononucleosis two years ago.  When she initially presented with tiredness and fatigue, her work up also was positive for ENOC, but otherwise was unremarkable.         Kidney Disease Hx:       h/o Kidney Problems: No  Family h/o Genetic Kidney Disease: No       h/o HTN: No    Usual BP: 100/70s       h/o Protein in  Urine: No  h/o Blood in Urine: No       h/o Kidney Stones: No  h/o Kidney Injury: No       h/o Recurrent UTI: No  h/o Genitourinary Problems: No       h/o Chronic NSAID Use: No         Other Medical Hx:       h/o DM: No   h/o Gestational DM: Not applicable       h/o Preeclampsia: Not applicable          h/o Gastrointestinal, Pancreas or Liver Problems: No       h/o Lung or Heart Problems: No       h/o Hematologic Problems: No  h/o Bleeding or Clotting Problems: No       h/o Cancer: No       h/o Infection Problems: No         Skin Cancer Risk:       h/o more than 50 moles: No       h/o extensive sun exposure: No       h/o melanoma: No       Family h/o melanoma: Yes: both paternal and maternal grandfathers         Mental Health Assessment:       h/o Depression: No       h/o Psychiatric Illness: Yes: anxiety, but well controlled on medication       h/o Suicidal Attempt(s): No    ROS:   A comprehensive review of systems was obtained and negative, except as noted in the HPI or PMH.    PMH:   History was taken from the patient as noted below.  Past Medical History:   Diagnosis Date     Anxiety      Hypothyroidism      Mononucleosis      Thyroid nodule, cold     s/p partial thyroidectomy     UTI (urinary tract infection)        PSH:   Past Surgical History:   Procedure Laterality Date     THYROIDECTOMY      partial     Personal or family history of anesthesia problems: No    Family Hx:   Family History   Problem Relation Age of Onset     Coronary Artery Disease Maternal Grandfather      Melanoma Maternal Grandfather      DIABETES Paternal Grandmother      Melanoma Paternal Grandfather      KIDNEY DISEASE No family hx of      Hypertension No family hx of           Specific Family Hx:       FH of DM: No       FH of CAD: No  FH of HTN: No       FH of Cancer: No  FH of Kidney Cancer: No    Personal Hx:   Social History     Social History     Marital status:      Spouse name: N/A     Number of children: 0     Years of  "education: N/A     Occupational History     Not on file.     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 Authorizing Provider   levothyroxine (SYNTHROID) 112 MCG tablet     Reported, Patient   ALPRAZolam (XANAX) 0.5 MG tablet TAKE 1 TABLET BY MOUTH UP THREE TIMES DAILY IN 24 HOURS AS NEEDED FOR ANXIETY 17   Reported, Patient       Vitals:  Vital Signs 3/9/2018 3/9/2018 3/9/2018   Systolic 118 110 107   Diastolic 71 71 72   Pulse 77 - -   Temperature 97.9 - -   Respirations 20 - -   Weight (LB) 183 lb 8 oz - -   Height 5' 5\" - -   BMI (Calculated) 30.6 - -   Pain - - -   O2 100 - -       Exam:   GENERAL APPEARANCE: alert and no distress  HENT: mouth without ulcers or lesions  LYMPHATICS: no cervical or supraclavicular nodes  RESP: lungs clear to auscultation - no rales, rhonchi or wheezes  CV: regular rhythm, normal rate, no rub, no murmur  EDEMA: no LE edema bilaterally  ABDOMEN: soft, nondistended, nontender, bowel sounds normal  MS: extremities normal - no gross deformities noted, no evidence of inflammation in joints, no muscle tenderness  SKIN: no rash    Results:   Labs and imaging were ordered for this visit and reviewed by me.  Recent Results (from the past 336 hour(s))   Routine UA with microscopic    " Collection Time: 03/09/18  6:52 AM   Result Value Ref Range    Color Urine Colorless     Appearance Urine Clear     Glucose Urine Negative NEG^Negative mg/dL    Bilirubin Urine Negative NEG^Negative    Ketones Urine Negative NEG^Negative mg/dL    Specific Gravity Urine 1.003 1.003 - 1.035    Blood Urine Negative NEG^Negative    pH Urine 6.0 5.0 - 7.0 pH    Protein Albumin Urine Negative NEG^Negative mg/dL    Urobilinogen mg/dL 0.0 0.0 - 2.0 mg/dL    Nitrite Urine Negative NEG^Negative    Leukocyte Esterase Urine Negative NEG^Negative    Source Midstream Urine     WBC Urine 0 0 - 5 /HPF    RBC Urine 0 0 - 2 /HPF    Mucous Urine Present (A) NEG^Negative /LPF   Albumin Random Urine Quantitative with Creat Ratio    Collection Time: 03/09/18  6:52 AM   Result Value Ref Range    Creatinine Urine 20 mg/dL    Albumin Urine mg/L <5 mg/L    Albumin Urine mg/g Cr Unable to calculate due to low value 0 - 25 mg/g Cr   Protein  random urine with Creat Ratio    Collection Time: 03/09/18  6:52 AM   Result Value Ref Range    Protein Random Urine <0.05 g/L    Protein Total Urine g/gr Creatinine Unable to calculate due to low value 0 - 0.2 g/g Cr   ABO/Rh type and screen    Collection Time: 03/09/18  6:55 AM   Result Value Ref Range    ABO O     RH(D) Neg     Antibody Screen Neg     Test Valid Only At          [facility]    Specimen Expires 03/12/2018    CMV Antibody IgG    Collection Time: 03/09/18  6:55 AM   Result Value Ref Range    CMV Antibody IgG 5.5 (H) 0.0 - 0.8 AI   EBV Capsid Antibody IgG    Collection Time: 03/09/18  6:55 AM   Result Value Ref Range    EBV Capsid Antibody IgG 7.3 (H) 0.0 - 0.8 AI   Hepatitis B core antibody    Collection Time: 03/09/18  6:55 AM   Result Value Ref Range    Hepatitis B Core Nancy Nonreactive NR^Nonreactive   Hepatitis B Surface Antibody    Collection Time: 03/09/18  6:55 AM   Result Value Ref Range    Hepatitis B Surface Antibody 39.98 (H) <8.00  m[IU]/mL   Hepatitis B surface antigen    Collection Time: 03/09/18  6:55 AM   Result Value Ref Range    Hep B Surface Agn Nonreactive NR^Nonreactive   Hepatitis C antibody    Collection Time: 03/09/18  6:55 AM   Result Value Ref Range    Hepatitis C Antibody Nonreactive NR^Nonreactive   HIV Antigen Antibody Combo Pretransplant    Collection Time: 03/09/18  6:55 AM   Result Value Ref Range    HIV Antigen Antibody Combo Pretransplant Nonreactive NR^Nonreactive   Anti Treponema    Collection Time: 03/09/18  6:55 AM   Result Value Ref Range    Treponema pallidum Antibody Negative NEG^Negative   Lipid Profile    Collection Time: 03/09/18  6:55 AM   Result Value Ref Range    Cholesterol 135 <200 mg/dL    Triglycerides 32 <150 mg/dL    HDL Cholesterol 66 >49 mg/dL    LDL Cholesterol Calculated 63 <100 mg/dL    Non HDL Cholesterol 69 <130 mg/dL   Comprehensive metabolic panel    Collection Time: 03/09/18  6:55 AM   Result Value Ref Range    Sodium 137 133 - 144 mmol/L    Potassium 4.0 3.4 - 5.3 mmol/L    Chloride 103 94 - 109 mmol/L    Carbon Dioxide 27 20 - 32 mmol/L    Anion Gap 6 3 - 14 mmol/L    Glucose 82 70 - 99 mg/dL    Urea Nitrogen 15 7 - 30 mg/dL    Creatinine 0.78 0.52 - 1.04 mg/dL    GFR Estimate 86 >60 mL/min/1.7m2    GFR Estimate If Black >90 >60 mL/min/1.7m2    Calcium 8.6 8.5 - 10.1 mg/dL    Bilirubin Total 0.6 0.2 - 1.3 mg/dL    Albumin 3.8 3.4 - 5.0 g/dL    Protein Total 7.1 6.8 - 8.8 g/dL    Alkaline Phosphatase 45 40 - 150 U/L    ALT 34 0 - 50 U/L    AST 29 0 - 45 U/L   HCG quantitative pregnancy    Collection Time: 03/09/18  6:55 AM   Result Value Ref Range    HCG Quantitative Serum <1 0 - 5 IU/L   Phosphorus    Collection Time: 03/09/18  6:55 AM   Result Value Ref Range    Phosphorus 3.2 2.5 - 4.5 mg/dL   Hemoglobin A1c    Collection Time: 03/09/18  6:55 AM   Result Value Ref Range    Hemoglobin A1C 5.3 4.3 - 6.0 %   INR    Collection Time: 03/09/18  6:55 AM   Result Value Ref Range    INR 1.01 0.86 -  1.14   Partial thromboplastin time    Collection Time: 03/09/18  6:55 AM   Result Value Ref Range    PTT 27 22 - 37 sec   CBC with platelets    Collection Time: 03/09/18  6:55 AM   Result Value Ref Range    WBC 6.2 4.0 - 11.0 10e9/L    RBC Count 3.99 3.8 - 5.2 10e12/L    Hemoglobin 12.7 11.7 - 15.7 g/dL    Hematocrit 38.4 35.0 - 47.0 %    MCV 96 78 - 100 fl    MCH 31.8 26.5 - 33.0 pg    MCHC 33.1 31.5 - 36.5 g/dL    RDW 12.4 10.0 - 15.0 %    Platelet Count 247 150 - 450 10e9/L   Complement C3    Collection Time: 03/09/18  6:55 AM   Result Value Ref Range    Complement C3 90 76 - 169 mg/dL   Complement C4    Collection Time: 03/09/18  6:55 AM   Result Value Ref Range    Complement C4 17 15 - 50 mg/dL   Uric acid    Collection Time: 03/09/18  6:55 AM   Result Value Ref Range    Uric Acid 5.0 2.6 - 6.0 mg/dL   EKG 12-lead complete w/read - Clinics    Collection Time: 03/09/18  7:13 AM   Result Value Ref Range    Interpretation ECG Click View Image link to view waveform and result      Again, thank you for allowing me to participate in the care of your patient.      Sincerely,    Jimbo John MD

## 2018-03-09 NOTE — MR AVS SNAPSHOT
After Visit Summary   3/9/2018    Elle Hill    MRN: [id number]           Patient Information     Date Of Birth          1985        Visit Information        Provider Department      3/9/2018 11:30 AM Quang Pantoja MD [facility] Solid Organ Transplant        Today's Diagnoses     Transplant donor evaluation    -  1       Follow-ups after your visit        Your next 10 appointments already scheduled     Mar 09, 2018  1:00 PM CST   LAB with  LAB   [facility] ([facility])    [address] [address] [phone]           Please do not eat 10-12 hours before your appointment if you are coming in fasting for labs on lipids, cholesterol, or glucose (sugar). This does not apply to pregnant women. Water, hot tea and black coffee (with nothing added) are okay. Do not drink other fluids, diet soda or chew gum.              Who to contact     If you have questions or need follow up information about today's clinic visit or your schedule please contact [facility] SOLID ORGAN TRANSPLANT directly at [phone].  Normal or non-critical lab and imaging results will be communicated to you by [employer], letter or phone within 4 business days after the clinic has received the results. If you do not hear from us within 7 days, please contact the clinic through [employer] or phone. If you have a critical or abnormal lab result, we will notify you by phone as soon as possible.  Submit refill requests through [employer] or call your pharmacy and they will forward the refill request to us. Please allow 3 business days for your refill to be completed.          Additional Information About Your Visit        [employer] Information     [employer] gives you secure access to your electronic health record. If you see a primary care provider, you can also send messages to your care team and make appointments. If you have questions, please call your primary care clinic.  If you  do not have a primary care provider, please call [phone] and they will assist you.        Care EveryWhere ID     This is your Care EveryWhere ID. This could be used by other organizations to access your [place] medical records  [id number]         Blood Pressure from Last 3 Encounters:   03/09/18 110/71   03/09/18 107/72   03/09/18 118/71    Weight from Last 3 Encounters:   03/09/18 83.2 kg (183 lb 8 oz)              Today, you had the following     No orders found for display       Primary Care Provider Fax #    Provider Not In System [phone]                Equal Access to Services     [facility]: Hadii bird Hu, nicole veras, janie oglesbymaerin trent, ruby fitzgerald . So [facility] [phone].    ATENCIÓN: Si habla español, tiene a jefferson disposición servicios gratuitos de asistencia lingüística. Llame al [phone].    We comply with applicable federal civil rights laws and [place] laws. We do not discriminate on the basis of race, color, national origin, age, disability, sex, sexual orientation, or gender identity.            Thank you!     Thank you for choosing [facility] SOLID ORGAN TRANSPLANT  for your care. Our goal is always to provide you with excellent care. Hearing back from our patients is one way we can continue to improve our services. Please take a few minutes to complete the written survey that you may receive in the mail after your visit with us. Thank you!             Your Updated Medication List - Protect others around you: Learn how to safely use, store and throw away your medicines at www.disposemymeds.org.          This list is accurate as of 3/9/18 12:45 PM.  Always use your most recent med list.                   Brand Name Dispense Instructions for use Diagnosis    ALPRAZolam 0.5 MG tablet    XANAX     TAKE 1 TABLET BY MOUTH UP THREE TIMES DAILY IN 24 HOURS AS NEEDED FOR ANXIETY        SYNTHROID 112 MCG tablet   Generic drug:   levothyroxine

## 2018-03-09 NOTE — MR AVS SNAPSHOT
After Visit Summary   3/9/2018    Elle Hill    MRN: [id number]           Patient Information     Date Of Birth          1985        Visit Information        Provider Department      3/9/2018 6:30 AM  TXC EVALUATION [facility] Solid Organ Transplant        Today's Diagnoses     Transplant donor evaluation    -  1       Follow-ups after your visit        Your next 10 appointments already scheduled     Mar 09, 2018  1:00 PM CST   LAB with  LAB   [facility] ([facility])    [address] [address] [phone]           Please do not eat 10-12 hours before your appointment if you are coming in fasting for labs on lipids, cholesterol, or glucose (sugar). This does not apply to pregnant women. Water, hot tea and black coffee (with nothing added) are okay. Do not drink other fluids, diet soda or chew gum.              Who to contact     If you have questions or need follow up information about today's clinic visit or your schedule please contact [facility] SOLID ORGAN TRANSPLANT directly at [phone].  Normal or non-critical lab and imaging results will be communicated to you by AntFarmhart, letter or phone within 4 business days after the clinic has received the results. If you do not hear from us within 7 days, please contact the clinic through b-datumt or phone. If you have a critical or abnormal lab result, we will notify you by phone as soon as possible.  Submit refill requests through Rivalry or call your pharmacy and they will forward the refill request to us. Please allow 3 business days for your refill to be completed.          Additional Information About Your Visit        AntFarmharYour Dollar Matters Information     Rivalry gives you secure access to your electronic health record. If you see a primary care provider, you can also send messages to your care team and make appointments. If you have questions, please call your primary care clinic.  If you  "do not have a primary care provider, please call [phone] and they will assist you.        Care EveryWhere ID     This is your Care EveryWhere ID. This could be used by other organizations to access your [place] medical records  [id number]        Your Vitals Were     Pulse Temperature Respirations Height Pulse Oximetry BMI (Body Mass Index)    77 97.9  F (36.6  C) (Oral) 20 1.651 m (5' 5\") 100% 30.54 kg/m2       Blood Pressure from Last 3 Encounters:   03/09/18 110/71   03/09/18 107/72   03/09/18 118/71    Weight from Last 3 Encounters:   03/09/18 83.2 kg (183 lb 8 oz)              Today, you had the following     No orders found for display       Primary Care Provider Fax #    Provider Not In System [phone]                Equal Access to Services     GAB CHOW : Hadii bird Hu, waedgardo veras, janie baconallesly trent, ruby fitzgerald . So [facility] [phone].    ATENCIÓN: Si habla español, tiene a jefferson disposición servicios gratuitos de asistencia lingüística. Zaida al [phone].    We comply with applicable federal civil rights laws and [place] laws. We do not discriminate on the basis of race, color, national origin, age, disability, sex, sexual orientation, or gender identity.            Thank you!     Thank you for choosing [facility] SOLID ORGAN TRANSPLANT  for your care. Our goal is always to provide you with excellent care. Hearing back from our patients is one way we can continue to improve our services. Please take a few minutes to complete the written survey that you may receive in the mail after your visit with us. Thank you!             Your Updated Medication List - Protect others around you: Learn how to safely use, store and throw away your medicines at www.disposemymeds.org.          This list is accurate as of 3/9/18 12:56 PM.  Always use your most recent med list.                   Brand Name Dispense Instructions for use Diagnosis    " ALPRAZolam 0.5 MG tablet    XANAX     TAKE 1 TABLET BY MOUTH UP THREE TIMES DAILY IN 24 HOURS AS NEEDED FOR ANXIETY        SYNTHROID 112 MCG tablet   Generic drug:  levothyroxine

## 2018-03-09 NOTE — LETTER
3/9/2018       RE: Elle Hill  [address] [address]     Dear Colleague,    Thank you for referring your patient, Elle Hill, to the [facility] SOLID ORGAN TRANSPLANT at [facility]. Please see a copy of my visit note below.    RE: Elle Hill  [facility] #[id number]           I saw your patient, Elle Hill, in consultation in our pretransplant clinic. She was here at clinic for evaluation as a possible kidney donor to her step-son. She presented to clinic today with her .  She had seen our donor video.  Our donor coordinator shared our center-specific results with her.  We discussed:    (1) The risks of donation--including mortality (0.03% risk) and morbidity (approximately 1% risk of major morbidity).  We discussed the major reported causes of death after kidney donation (bleeding, infection, pulmonary embolism).  We discussed the potential complications, including:  bleeding requiring reoperation, infection requiring reoperation, pneumonia, bowel obstruction, infection at the site of the incision, hernia at the site of the incision, deep vein thrombosis, deep vein thrombosis with associated pulmonary embolism, and urinary tract infection.  I told her I could not possibly name all of the risks, but that she was at risk for any complications that could occur in any operation (and that a local library would have books/resources that could provide more detail).       (2) We also discussed the long-term risks of living with one kidney.  We talked in detail about the new publications suggesting slightly increased long-term risk of ESRD after donor nephrectomy.  We discussed the fact that most of the ESRD that has developed after donation has occurred in those donating to a relative; and that it is known that individuals who have a relative with ESRD are at increased risk of ESRD.     (3) The hospital stay and the fact that if all goes well, discharge would  occur within two to three days after donor nephrectomy.  We also discussed the fact that there would be no diet or fluid restrictions (again if all went well), and that the only new medication that she would be on would be pain medication.  We discussed the fact that most patients are on Tylenol or something similar within five to six days of surgery.      (4) The recovery time after surgery and the restrictions that are necessary:  a) no driving for a couple of weeks (or until she felt that her reaction would be adequate if she had to slam on the brakes; and b) no lifting over 10 pounds or any exercise that would stretch her abdominal muscles for six weeks (to allow the muscles to heal so that she doesn't develop a hernia).  We also discussed return to work, which could occur in approximately three to four weeks if she had a desk job or would require not returning to work for at least six weeks if the job required any heavy lifting or exercise.  We discussed the potential for not getting her pep and energy back for anywhere from two to six weeks after surgery and how there was a tremendous individual variation in return of full energy level.  I discussed our donor follow-up studies; and that in our surveys, 90% of donors had their energy back by 6 weeks postdonation.    (5) The concern about long distance travel for the first couple of weeks post-donation.  We discussed the risks of deep vein thrombosis associated with plane or car travel.  I recommended that, if flying, she should get up and walk around every 30-40 minutes; if driving she should ask the  to stop the car every 30-45 minutes so Ms. Hill could take a short walk.    (6) The fact that the recipient could be on a waiting list for  donor transplant and would ultimately receive a kidney if Ms. Hill did not donate.      I attempted to answer any remaining questions.  I also told her that should she have any questions, she should feel free to  contact us.  We would be glad to answer any questions either over the phone or at another clinic visit.  Her transplant coordinator is Marci Ricardo and may be reached at [phone].  Thank you for the opportunity to see her.    I spent 30 minutes with this patient.  Over 95% that time was spent in counseling and coordination of care.             Yours truly,         Quang Pantoja MD         Professor of Surgery         ([phone])

## 2018-03-09 NOTE — PROGRESS NOTES
RE: Elle Hill  [facility] #[id number]           I saw your patient, Elle Hill, in consultation in our pretransplant clinic. She was here at clinic for evaluation as a possible kidney donor to her step-son. She presented to clinic today with her .  She had seen our donor video.  Our donor coordinator shared our center-specific results with her.  We discussed:    (1) The risks of donation--including mortality (0.03% risk) and morbidity (approximately 1% risk of major morbidity).  We discussed the major reported causes of death after kidney donation (bleeding, infection, pulmonary embolism).  We discussed the potential complications, including:  bleeding requiring reoperation, infection requiring reoperation, pneumonia, bowel obstruction, infection at the site of the incision, hernia at the site of the incision, deep vein thrombosis, deep vein thrombosis with associated pulmonary embolism, and urinary tract infection.  I told her I could not possibly name all of the risks, but that she was at risk for any complications that could occur in any operation (and that a local library would have books/resources that could provide more detail).       (2) We also discussed the long-term risks of living with one kidney.  We talked in detail about the new publications suggesting slightly increased long-term risk of ESRD after donor nephrectomy.  We discussed the fact that most of the ESRD that has developed after donation has occurred in those donating to a relative; and that it is known that individuals who have a relative with ESRD are at increased risk of ESRD.     (3) The hospital stay and the fact that if all goes well, discharge would occur within two to three days after donor nephrectomy.  We also discussed the fact that there would be no diet or fluid restrictions (again if all went well), and that the only new medication that she would be on would be pain medication.  We discussed the fact that most patients are  on Tylenol or something similar within five to six days of surgery.      (4) The recovery time after surgery and the restrictions that are necessary:  a) no driving for a couple of weeks (or until she felt that her reaction would be adequate if she had to slam on the brakes; and b) no lifting over 10 pounds or any exercise that would stretch her abdominal muscles for six weeks (to allow the muscles to heal so that she doesn't develop a hernia).  We also discussed return to work, which could occur in approximately three to four weeks if she had a desk job or would require not returning to work for at least six weeks if the job required any heavy lifting or exercise.  We discussed the potential for not getting her pep and energy back for anywhere from two to six weeks after surgery and how there was a tremendous individual variation in return of full energy level.  I discussed our donor follow-up studies; and that in our surveys, 90% of donors had their energy back by 6 weeks postdonation.    (5) The concern about long distance travel for the first couple of weeks post-donation.  We discussed the risks of deep vein thrombosis associated with plane or car travel.  I recommended that, if flying, she should get up and walk around every 30-40 minutes; if driving she should ask the  to stop the car every 30-45 minutes so Ms. Hill could take a short walk.    (6) The fact that the recipient could be on a waiting list for  donor transplant and would ultimately receive a kidney if Ms. Hill did not donate.      I attempted to answer any remaining questions.  I also told her that should she have any questions, she should feel free to contact us.  We would be glad to answer any questions either over the phone or at another clinic visit.  Her transplant coordinator is Marci Ricardo and may be reached at [phone].  Thank you for the opportunity to see her.    I spent 30 minutes with this patient.  Over 95%  that time was spent in counseling and coordination of care.             Yours truly,               Quang Pantoja MD         Professor of Surgery         ([phone])      TIN/st

## 2018-03-09 NOTE — MR AVS SNAPSHOT
After Visit Summary   3/9/2018    Elle Hill    MRN: [id number]           Patient Information     Date Of Birth          1985        Visit Information        Provider Department      3/9/2018 9:00 AM [facility]        Today's Diagnoses     Transplant donor evaluation    -  1       Follow-ups after your visit        Who to contact     If you have questions or need follow up information about today's clinic visit or your schedule please contact [facility] SOLID ORGAN TRANSPLANT directly at [phone].  Normal or non-critical lab and imaging results will be communicated to you by trivagohart, letter or phone within 4 business days after the clinic has received the results. If you do not hear from us within 7 days, please contact the clinic through [employer] or phone. If you have a critical or abnormal lab result, we will notify you by phone as soon as possible.  Submit refill requests through [employer] or call your pharmacy and they will forward the refill request to us. Please allow 3 business days for your refill to be completed.          Additional Information About Your Visit        MyChart Information     [employer] gives you secure access to your electronic health record. If you see a primary care provider, you can also send messages to your care team and make appointments. If you have questions, please call your primary care clinic.  If you do not have a primary care provider, please call [phone] and they will assist you.        Care EveryWhere ID     This is your Care EveryWhere ID. This could be used by other organizations to access your [place] medical records  [id number]         Blood Pressure from Last 3 Encounters:   03/09/18 110/71   03/09/18 107/72   03/09/18 118/71    Weight from Last 3 Encounters:   03/09/18 83.2 kg (183 lb 8 oz)              Today, you had the following     No orders found for display       Primary Care Provider Fax #     Provider Not In System [phone]                Equal Access to Services     KEIRA CHOW : Hadii bird Hu, nicole veras, ruby castañeda. So [facility] [phone].    ATENCIÓN: Si habla español, tiene a jefferson disposición servicios gratuitos de asistencia lingüística. Llame al [phone].    We comply with applicable federal civil rights laws and [place] laws. We do not discriminate on the basis of race, color, national origin, age, disability, sex, sexual orientation, or gender identity.            Thank you!     Thank you for choosing [facility] SOLID ORGAN TRANSPLANT  for your care. Our goal is always to provide you with excellent care. Hearing back from our patients is one way we can continue to improve our services. Please take a few minutes to complete the written survey that you may receive in the mail after your visit with us. Thank you!             Your Updated Medication List - Protect others around you: Learn how to safely use, store and throw away your medicines at www.disposemymeds.org.          This list is accurate as of 3/9/18 11:59 PM.  Always use your most recent med list.                   Brand Name Dispense Instructions for use Diagnosis    ALPRAZolam 0.5 MG tablet    XANAX     TAKE 1 TABLET BY MOUTH UP THREE TIMES DAILY IN 24 HOURS AS NEEDED FOR ANXIETY        SYNTHROID 112 MCG tablet   Generic drug:  levothyroxine

## 2018-03-09 NOTE — MR AVS SNAPSHOT
After Visit Summary   3/9/2018    Elle Hill    MRN: [id number]           Patient Information     Date Of Birth          1985        Visit Information        Provider Department      3/9/2018 10:00 AM Jimbo John MD [facility] Nephrology        Today's Diagnoses     Transplant donor evaluation    -  1       Follow-ups after your visit        Who to contact     If you have questions or need follow up information about today's clinic visit or your schedule please contact [facility] NEPHROLOGY directly at [phone].  Normal or non-critical lab and imaging results will be communicated to you by MyChart, letter or phone within 4 business days after the clinic has received the results. If you do not hear from us within 7 days, please contact the clinic through U4EA Networkshart or phone. If you have a critical or abnormal lab result, we will notify you by phone as soon as possible.  Submit refill requests through [employer]S (formerly [employer]) or call your pharmacy and they will forward the refill request to us. Please allow 3 business days for your refill to be completed.          Additional Information About Your Visit        MyChart Information     T[employer] (formerly [employer]) gives you secure access to your electronic health record. If you see a primary care provider, you can also send messages to your care team and make appointments. If you have questions, please call your primary care clinic.  If you do not have a primary care provider, please call [phone] and they will assist you.        Care EveryWhere ID     This is your Care EveryWhere ID. This could be used by other organizations to access your [place] medical records  [id number]         Blood Pressure from Last 3 Encounters:   03/09/18 110/71   03/09/18 107/72   03/09/18 118/71    Weight from Last 3 Encounters:   03/09/18 83.2 kg (183 lb 8 oz)               Primary Care Provider Fax #    Provider Not In System [phone]                Equal Access to Services     KEIRA  GAAR : Hadii bird ramirez og Hu, wamarionda luqadaha, qaybta kakylah sharrimaria fernandaerin, waxalessandro palmira haysg claytonmaxsylvia fitzgerald . So [facility] [phone].    ATENCIÓN: Si habla español, tiene a jefferson disposición servicios gratuitos de asistencia lingüística. Llame al [phone].    We comply with applicable federal civil rights laws and [place] laws. We do not discriminate on the basis of race, color, national origin, age, disability, sex, sexual orientation, or gender identity.            Thank you!     Thank you for choosing [facility] NEPHROLOGY  for your care. Our goal is always to provide you with excellent care. Hearing back from our patients is one way we can continue to improve our services. Please take a few minutes to complete the written survey that you may receive in the mail after your visit with us. Thank you!             Your Updated Medication List - Protect others around you: Learn how to safely use, store and throw away your medicines at www.disposemymeds.org.          This list is accurate as of 3/9/18 11:59 PM.  Always use your most recent med list.                   Brand Name Dispense Instructions for use Diagnosis    ALPRAZolam 0.5 MG tablet    XANAX     TAKE 1 TABLET BY MOUTH UP THREE TIMES DAILY IN 24 HOURS AS NEEDED FOR ANXIETY        SYNTHROID 112 MCG tablet   Generic drug:  levothyroxine

## 2018-03-12 ENCOUNTER — HEALTH MAINTENANCE LETTER (OUTPATIENT)
Age: 33
End: 2018-03-12

## 2018-03-12 LAB
ANA SER QL IF: NEGATIVE
M TB TUBERC IFN-G BLD QL: NEGATIVE
M TB TUBERC IFN-G/MITOGEN IGNF BLD: 0 IU/ML

## 2018-03-13 LAB
BSA: 1.98 M2
IOHEXOL CL UR+SERPL-VRATE: 103 ML/MIN
IOHEXOL CL UR+SERPL-VRATE: 3.9 MG/DL
IOHEXOL CL UR+SERPL-VRATE: 7.57 MG/DL
IOHEXOL CL UR+SERPL-VRATE: 90 /1.73 M2

## 2018-03-14 ENCOUNTER — TELEPHONE (OUTPATIENT)
Dept: TRANSPLANT | Facility: CLINIC | Age: 33
End: 2018-03-14

## 2018-03-14 ENCOUNTER — COMMITTEE REVIEW (OUTPATIENT)
Dept: TRANSPLANT | Facility: CLINIC | Age: 33
End: 2018-03-14

## 2018-03-14 NOTE — COMMITTEE REVIEW
Abdominal Committee Review Note     Evaluation Date:   Committee Review Date: 3/14/2018    Organ being evaluated for: Kidney    Transplant Phase:   Transplant Status:     Transplant Coordinator: Marci Ricardo  Transplant Surgeon:       Referring Physician: Referred Self    Primary Diagnosis:   Secondary Diagnosis:     Committee Review Members:  Nephrology Juma Burns MD   Nutrition Clarissa Hernadez, RD   Pharmacy Keegan Parada, [facility]    - Clinical Mitzi Grant, [facility]   Transplant Carrie Billy, LISA, Marci Ricardo, LISA, Quang Pantoja MD, Virgen Seaman, TANIA, Simran Godfrey, JULIANAN, Jimbo John MD, Terry Pearl MD       Transplant Eligibility:     Committee Review Decision: Needs Re-presentation    Relative Contraindications: Other, Right pelvic lymph nodes.  re-assess in3-6 months    Absolute Contraindications: None    Committee Chair Terry Pearl MD verbally attested to the committee's decision.    Committee Discussion Details: Labs ok.  Neg ENOC.  Will need NLDAC vasquez.  Needs to see PCP for right pelvic lymph nodes.  Needs neg pap.Needs to maintain Wt or lose.  Left kidney or choice.

## 2018-03-15 ENCOUNTER — TELEPHONE (OUTPATIENT)
Dept: TRANSPLANT | Facility: CLINIC | Age: 33
End: 2018-03-15

## 2018-03-15 ENCOUNTER — DOCUMENTATION ONLY (OUTPATIENT)
Dept: TRANSPLANT | Facility: CLINIC | Age: 33
End: 2018-03-15

## 2018-03-15 NOTE — TELEPHONE ENCOUNTER
Arun Swain,     Would I be able to obtain that information on my CT scans via email or so you need to physically mail that in?  I have a doctors appointment this Friday so I d like for my doctor to try and get something on the books by the beginning of a June.  Also, I left a voicemail regarding the swabs we took when I was there.  I believe it had to do with tissue matching?  Would that not be done until after my next CT results?     Thank you,  Elle Hill      I sent a copy of the CT to Elle and asked that we talk next week about processing buccal swab.

## 2018-03-16 ENCOUNTER — MEDICAL CORRESPONDENCE (OUTPATIENT)
Dept: TRANSPLANT | Facility: CLINIC | Age: 33
End: 2018-03-16

## 2018-04-15 DIAGNOSIS — E03.9 ACQUIRED HYPOTHYROIDISM: Primary | ICD-10-CM

## 2018-04-16 RX ORDER — LEVOTHYROXINE SODIUM 112 UG/1
TABLET ORAL
Qty: 30 TABLET | Refills: 0 | Status: SHIPPED | OUTPATIENT
Start: 2018-04-16 | End: 2018-04-26 | Stop reason: SDUPTHER

## 2018-04-17 ENCOUNTER — LAB (OUTPATIENT)
Dept: LAB | Facility: CLINIC | Age: 33
End: 2018-04-17
Payer: COMMERCIAL

## 2018-04-17 DIAGNOSIS — Z00.00 ENCOUNTER FOR GENERAL ADULT MEDICAL EXAMINATION WITHOUT ABNORMAL FINDINGS: ICD-10-CM

## 2018-04-17 DIAGNOSIS — E03.9 HYPOTHYROIDISM: ICD-10-CM

## 2018-04-17 LAB
ALBUMIN SERPL-MCNC: 4.2 G/DL (ref 3.5–5.3)
ALP SERPL-CCNC: 45 U/L (ref 37–98)
ALT SERPL-CCNC: 14 U/L (ref 0–52)
ANION GAP SERPL CALC-SCNC: 7 MMOL/L (ref 3–11)
AST SERPL-CCNC: 20 U/L (ref 0–39)
BASOPHILS # BLD AUTO: 0 10*3/UL
BASOPHILS NFR BLD AUTO: 0 % (ref 0–2)
BILIRUB SERPL-MCNC: 0.57 MG/DL (ref 0–1.4)
BUN SERPL-MCNC: 16 MG/DL (ref 7–25)
CALCIUM ALBUM COR SERPL-MCNC: 9.5 MG/DL (ref 8.5–10.1)
CALCIUM SERPL-MCNC: 9.7 MG/DL (ref 8.6–10.3)
CHLORIDE SERPL-SCNC: 103 MMOL/L (ref 98–107)
CHOLEST SERPL-MCNC: 149 MG/DL (ref 0–199)
CO2 SERPL-SCNC: 27 MMOL/L (ref 21–32)
CREAT SERPL-MCNC: 0.8 MG/DL (ref 0.6–1.2)
EOSINOPHIL # BLD AUTO: 0.1 10*3/UL
EOSINOPHIL NFR BLD AUTO: 2 % (ref 0–3)
ERYTHROCYTE [DISTWIDTH] IN BLOOD BY AUTOMATED COUNT: 13.3 % (ref 11.5–14)
GFR SERPL CREATININE-BSD FRML MDRD: 83 ML/MIN/1.73M*2
GLUCOSE SERPL-MCNC: 98 MG/DL (ref 70–105)
HCT VFR BLD AUTO: 39.3 % (ref 34–45)
HDLC SERPL-MCNC: 62 MG/DL
HGB BLD-MCNC: 13.2 G/DL (ref 11.5–15.5)
LDLC SERPL CALC-MCNC: 78 MG/DL (ref 0–99)
LYMPHOCYTES # BLD AUTO: 2.2 10*3/UL
LYMPHOCYTES NFR BLD AUTO: 34 % (ref 11–47)
MCH RBC QN AUTO: 31.5 PG (ref 28–33)
MCHC RBC AUTO-ENTMCNC: 33.6 G/DL (ref 32–36)
MCV RBC AUTO: 93.8 FL (ref 81–97)
MONOCYTES # BLD AUTO: 0.5 10*3/UL
MONOCYTES NFR BLD AUTO: 8 % (ref 3–11)
NEUTROPHILS # BLD AUTO: 3.7 10*3/UL
NEUTROPHILS NFR BLD AUTO: 56 % (ref 41–81)
PLATELET # BLD AUTO: 234 10*3/UL (ref 140–350)
PMV BLD AUTO: 7.9 FL (ref 6.9–10.8)
POTASSIUM SERPL-SCNC: 4.1 MMOL/L (ref 3.5–5.1)
PROT SERPL-MCNC: 6.9 G/DL (ref 6–8.3)
RBC # BLD AUTO: 4.19 10*6/ΜL (ref 3.7–5.3)
SODIUM SERPL-SCNC: 137 MMOL/L (ref 135–145)
TRIGL SERPL-MCNC: 43 MG/DL
TSH SERPL DL<=0.05 MIU/L-ACNC: 2.68 UIU/ML (ref 0.34–4.82)
WBC # BLD AUTO: 6.6 10*3/UL (ref 4.5–10.5)

## 2018-04-17 PROCEDURE — 36415 COLL VENOUS BLD VENIPUNCTURE: CPT

## 2018-04-17 PROCEDURE — 80050 GENERAL HEALTH PANEL: CPT

## 2018-04-17 PROCEDURE — 80061 LIPID PANEL: CPT

## 2018-04-18 ENCOUNTER — TELEPHONE (OUTPATIENT)
Dept: TRANSPLANT | Facility: CLINIC | Age: 33
End: 2018-04-18

## 2018-04-18 NOTE — TELEPHONE ENCOUNTER
Elle Hill would like to request a referral.     Reason: Renew CT Scan for Kindey Donation     Requested provider: [facility] - Denae Loomis      Comment:     I received information from Marci, my transplant coordinator, regarding retesting for my abdominal/pelvis CT with contrast 3 months after my previous testing.  I live in [place] and will do my follow up testing at [facility] but they need a request from the doctor recommending this testing.  Please fax this request to: [phone] ATTN: Denae Loomis.  Or email to: [email or web address]           Thank you,     Elle Hill

## 2018-04-18 NOTE — TELEPHONE ENCOUNTER
I spoke to Elle today to respond to her request for referral for CT of abdomen.   When I called she said she had sent the radiology center our letter regarding the image.  They will schedule.  This will be paid for by her insurance.

## 2018-04-26 ENCOUNTER — OFFICE VISIT (OUTPATIENT)
Dept: INTERNAL MEDICINE | Facility: CLINIC | Age: 33
End: 2018-04-26
Payer: COMMERCIAL

## 2018-04-26 VITALS
OXYGEN SATURATION: 100 % | WEIGHT: 185 LBS | DIASTOLIC BLOOD PRESSURE: 68 MMHG | HEART RATE: 64 BPM | TEMPERATURE: 97.9 F | SYSTOLIC BLOOD PRESSURE: 92 MMHG | BODY MASS INDEX: 31.02 KG/M2

## 2018-04-26 DIAGNOSIS — E03.9 ACQUIRED HYPOTHYROIDISM: ICD-10-CM

## 2018-04-26 DIAGNOSIS — E66.811 CLASS 1 OBESITY DUE TO EXCESS CALORIES WITHOUT SERIOUS COMORBIDITY WITH BODY MASS INDEX (BMI) OF 32.0 TO 32.9 IN ADULT: Primary | ICD-10-CM

## 2018-04-26 DIAGNOSIS — E66.09 CLASS 1 OBESITY DUE TO EXCESS CALORIES WITHOUT SERIOUS COMORBIDITY WITH BODY MASS INDEX (BMI) OF 32.0 TO 32.9 IN ADULT: Primary | ICD-10-CM

## 2018-04-26 PROCEDURE — 99213 OFFICE O/P EST LOW 20 MIN: CPT | Performed by: NURSE PRACTITIONER

## 2018-04-26 RX ORDER — MAGNESIUM 250 MG
250 TABLET ORAL DAILY
COMMUNITY
End: 2019-10-31 | Stop reason: ALTCHOICE

## 2018-04-26 RX ORDER — MULTIVITAMIN
1 TABLET ORAL DAILY
COMMUNITY
End: 2021-04-07 | Stop reason: ALTCHOICE

## 2018-04-26 RX ORDER — ASCORBIC ACID 500 MG
500 TABLET ORAL DAILY
COMMUNITY
End: 2019-05-01 | Stop reason: ALTCHOICE

## 2018-04-26 RX ORDER — LEVOTHYROXINE SODIUM 112 UG/1
112 TABLET ORAL
Qty: 90 TABLET | Refills: 3 | Status: SHIPPED | OUTPATIENT
Start: 2018-04-26 | End: 2018-05-15 | Stop reason: SDUPTHER

## 2018-04-26 ASSESSMENT — PAIN SCALES - GENERAL: PAINLEVEL: 0-NO PAIN

## 2018-04-26 NOTE — PROGRESS NOTES
Subjective      Sierra Kelley is a 33 y.o. female who presents for follow-up of hypothyroidism.  Patient had her lab work redrawn 1 week ago.  Her appointment was rescheduled due to a snowstorm.  We will review those results with her today and they were are within recommended range.  Will make no changes to her Synthroid.  Patient states she feels much better on this dose.  Patient has been at [facility] for possible kidney donor evaluation.  Upon their exam they found an enlarged lymph nodes in her abdomen and pelvis.  Patient will need a follow-up to ensure that they have not enlarged.  We did discuss at length that she has been very sick and ill in the past due to mononucleosis.  Patient is also chronically constipated due to her hypothyroidism.  Patient was very reassured after these discussions.  She does need to lose approximately 10-15 pounds prior to her next evaluation with the erythematous soda.  I did recommend [facility] for recommended weight loss methods.  HPI    The following have been reviewed and updated as appropriate in this visit:  No text in SmartText        No Known Allergies  Current Outpatient Prescriptions   Medication Sig Dispense Refill   • ascorbic acid, vitamin C, (ascorbic acid with kimberli hips) 500 mg tablet Take 500 mg by mouth daily.     • calcium carbonate-vitamin D3 600 mg(1,500mg) -400 unit per tablet Take 1 tablet by mouth daily.     • Lactobacillus acidophilus (ACIDOPHILUS ORAL) Take 1 tab/cap by mouth daily.     • magnesium oxide 250 mg tablet Take 250 mg by mouth daily.     • omega-3 fatty acids-fish oil (FISH OIL) 340-1,000 mg capsule Take 1 tab/cap by mouth daily.     • SYNTHROID 112 mcg tablet Take 1 tablet (112 mcg total) by mouth once daily. 90 tablet 3     No current facility-administered medications for this visit.      Past Medical History:   Diagnosis Date   • Anxiety    • Chronic headache    • Depression    • Hypothyroid      Past Surgical History:    Procedure Laterality Date   • THYROIDECTOMY       Family History   Problem Relation Age of Onset   • Skin cancer Paternal Grandfather      Social History     Social History   • Marital status:      Spouse name: N/A   • Number of children: N/A   • Years of education: 16     Occupational History   •       Social History Main Topics   • Smoking status: Never Smoker   • Smokeless tobacco: Never Used   • Alcohol use Yes   • Drug use: No   • Sexual activity: Defer     Other Topics Concern   • None     Social History Narrative   • None       Review of Systems   Constitutional: Negative.    HENT: Negative.    Eyes: Negative.    Respiratory: Negative for cough, chest tightness and shortness of breath.    Cardiovascular: Negative.    Gastrointestinal: Negative for abdominal pain, constipation, diarrhea and nausea.   Endocrine: Negative.    Genitourinary: Negative for difficulty urinating, frequency and urgency.   Musculoskeletal: Negative.    Skin: Negative.    Neurological: Negative for dizziness, weakness, light-headedness, numbness and headaches.   Psychiatric/Behavioral: Negative for behavioral problems, self-injury, sleep disturbance and suicidal ideas. The patient is not nervous/anxious.        Objective   BP 92/68   Pulse 64   Temp 36.6 °C (97.9 °F)   Wt 83.9 kg (185 lb)   SpO2 100%   BMI 31.02 kg/m²     Physical Exam   Constitutional: She is oriented to person, place, and time. She appears well-developed and well-nourished.   HENT:   Head: Normocephalic and atraumatic.   Right Ear: External ear normal.   Left Ear: External ear normal.   Nose: Nose normal.   Mouth/Throat: Oropharynx is clear and moist.   Eyes: Conjunctivae and EOM are normal. Pupils are equal, round, and reactive to light.   Neck: Normal range of motion. Neck supple.   Cardiovascular: Normal rate, regular rhythm and normal heart sounds.  Exam reveals no gallop and no friction rub.    No murmur heard.  Pulmonary/Chest:  Effort normal and breath sounds normal.   Abdominal: Soft. Bowel sounds are normal.   Neurological: She is alert and oriented to person, place, and time.   Skin: Skin is warm and dry.   Vitals reviewed.      ASSESSMENT AND PLAN   Diagnoses and all orders for this visit:    Class 1 obesity due to excess calories without serious comorbidity with body mass index (BMI) of 32.0 to 32.9 in adult    Acquired hypothyroidism  -     SYNTHROID 112 mcg tablet; Take 1 tablet (112 mcg total) by mouth once daily.    1.  Acquired hypothyroidism.  Patient will have her Synthroid refilled as directed.  Patient will be rechecked in 1 year or on as-needed basis.  2.  Candidate for kidney donation.  We discussed this at length and address the enlarged lymph nodes that were detected.  Patient was reassured and will have reevaluation in 6 months.  3.  Class I obesity.  Patient would benefit from referral to [facility] for further evaluation and assessment for weight loss.  4.  All medical conditions are stable at this time.  Patient will follow up in 1 year or on as-needed basis.    YANELI JOE, CNP

## 2018-04-27 ASSESSMENT — ENCOUNTER SYMPTOMS
HEADACHES: 0
EYES NEGATIVE: 1
NUMBNESS: 0
MUSCULOSKELETAL NEGATIVE: 1
NAUSEA: 0
LIGHT-HEADEDNESS: 0
FREQUENCY: 0
CARDIOVASCULAR NEGATIVE: 1
SLEEP DISTURBANCE: 0
SHORTNESS OF BREATH: 0
ENDOCRINE NEGATIVE: 1
CONSTITUTIONAL NEGATIVE: 1
DIARRHEA: 0
NERVOUS/ANXIOUS: 0
ABDOMINAL PAIN: 0
CONSTIPATION: 0
DIZZINESS: 0
WEAKNESS: 0
DIFFICULTY URINATING: 0
CHEST TIGHTNESS: 0
COUGH: 0

## 2018-05-15 DIAGNOSIS — E03.9 ACQUIRED HYPOTHYROIDISM: ICD-10-CM

## 2018-05-15 RX ORDER — LEVOTHYROXINE SODIUM 112 UG/1
TABLET ORAL
Qty: 30 TABLET | Refills: 11 | Status: SHIPPED | OUTPATIENT
Start: 2018-05-15 | End: 2019-05-03 | Stop reason: ALTCHOICE

## 2018-06-12 ENCOUNTER — TRANSFERRED RECORDS (OUTPATIENT)
Dept: HEALTH INFORMATION MANAGEMENT | Facility: CLINIC | Age: 33
End: 2018-06-12

## 2018-06-14 ENCOUNTER — TELEPHONE (OUTPATIENT)
Dept: TRANSPLANT | Facility: CLINIC | Age: 33
End: 2018-06-14

## 2018-06-14 NOTE — TELEPHONE ENCOUNTER
I redid my CT Scan like requested.  The results were sent to your office yesterday morning.  I'm just following up with the results since [facility] wouldn't provide me the results over the phone.  They said I would have to talk to your office directly.  Of course I'm just very curious and anxious on how everything went.  Please give me a call once you had a chance to review my records of my scan.     Thank you,   Elle Hill   [phone]

## 2018-06-20 ENCOUNTER — COMMITTEE REVIEW (OUTPATIENT)
Dept: TRANSPLANT | Facility: CLINIC | Age: 33
End: 2018-06-20

## 2018-06-20 ENCOUNTER — TELEPHONE (OUTPATIENT)
Dept: TRANSPLANT | Facility: CLINIC | Age: 33
End: 2018-06-20

## 2018-06-20 NOTE — COMMITTEE REVIEW
Abdominal Committee Review Note     Evaluation Date:   Committee Review Date: 6/20/2018    Organ being evaluated for: Kidney    Transplant Phase:   Transplant Status:     Transplant Coordinator: Marci Ricardo  Transplant Surgeon:       Referring Physician: Referred Self    Primary Diagnosis:   Secondary Diagnosis:     Committee Review Members:  Nephrology Catrachito Boucher MD   Pharmacy Herminio Rader [facility]    - Clinical Brynn Farmer, [facility], Mitzi Grant, [facility]   Transplant Carrie Billy, RN, Marci Ricardo, LISA, Quang Pantoja MD, Simran Godfrey LPN, Elicia Centeno RN, Terry Pearl MD       Transplant Eligibility: Acceptable Physical Health    Committee Review Decision      Reviewed Elle's abnormal evaluation findings:review of repeat CT by committee.  No change in lymph nodes.  See report        Also of note: Donor will be approved with neg pap.  She is sending report from 2016       CT Reviewed and decision to use LEFT kidney for donation.       Next Steps:WAIT FOR PAP RESULTS.  APPROVE DONOR AFTER PAP RESULTS RECIEVED    Relative Contraindications:     Absolute Contraindications: None    Committee Chair Terry Pearl MD verbally attested to the committee's decision.    Committee Discussion Details: Reviewed repeat CT after 3 months.  No change in lyymph nodes   It was suggested to bring other donors forward due to previous CT but no other donors have come forward

## 2018-06-26 ENCOUNTER — RESULTS ONLY (OUTPATIENT)
Dept: OTHER | Facility: CLINIC | Age: 33
End: 2018-06-26

## 2018-06-28 LAB
A* LOCUS NMDP: NORMAL
A* LOCUS: NORMAL
A* NMDP: NORMAL
ABTEST METHOD: NORMAL
B* LOCUS NMDP: NORMAL
B* LOCUS: NORMAL
B* NMDP: NORMAL
B*: NORMAL
BW-1: NORMAL
BW-2: NORMAL
C* LOCUS NMDP: NORMAL
C* LOCUS: NORMAL
C* NMDP: NORMAL
C*: NORMAL
DPA1* NMDP: NORMAL
DPA1*: NORMAL
DPB1* LOCUS NMDP: NORMAL
DPB1* NMDP: NORMAL
DPB1*: NORMAL
DPB1*LOCUS: NORMAL
DQA1*LOCUS NMDP: NORMAL
DQA1*LOCUS: NORMAL
DQA1*NMDP: NORMAL
DQB1* LOCUS NMDP: NORMAL
DQB1* LOCUS: NORMAL
DQB1* NMDP: NORMAL
DQB1*: NORMAL
DRB1* LOCUS NMDP: NORMAL
DRB1* LOCUS: NORMAL
DRB1* NMDP: NORMAL
DRB4* LOCUS NMDP: NORMAL
DRB4* LOCUS: NORMAL
DRB4* NMDP: NORMAL
DRSSO TEST METHOD: NORMAL

## 2018-06-29 ENCOUNTER — DOCUMENTATION ONLY (OUTPATIENT)
Dept: PHARMACY | Facility: CLINIC | Age: 33
End: 2018-06-29

## 2018-06-29 NOTE — PHARMACY-MEDICATION REGIMEN REVIEW
Pharmacy Living Kidney Donor Medication Evaluation     This patient is a 33 year old female being considered for living kidney donation. As part of the kidney pre-donation patient evaluation, pharmacy has screened this patient's electronic medical record for medication related concerns.    Assessment / Plan    No significant potential medication related issues are expected for this patient post surgery, based on the medical record medication list review.  Pharmacy will continue to participate in this patient's care throughout the surgery course.  Please contact pharmacy with any further medication related questions or concerns.     JOHN Mccloud.Ph.  [facility]  [phone] pager

## 2018-07-05 ENCOUNTER — TELEPHONE (OUTPATIENT)
Dept: TRANSPLANT | Facility: CLINIC | Age: 33
End: 2018-07-05

## 2018-07-05 NOTE — TELEPHONE ENCOUNTER
Good afternoon Marci,    I m sorry to bother you.  I haven t heard anything last week regarding the results of the crossmatch so I just wanted to see if you can see if there are any issues or if you have a better eta on the results.  We are going on Major s [place] trip in a few days to [place] so I ll be out of the country and can t answer my phone.  We fly out on Monday so I was hoping to talk to you before then.  Thank you for all you do!     Elle Hill

## 2018-07-06 DIAGNOSIS — F43.22 ADJUSTMENT DISORDER WITH ANXIOUS MOOD: Primary | ICD-10-CM

## 2018-07-06 RX ORDER — ALPRAZOLAM 0.5 MG/1
TABLET ORAL
Qty: 30 TABLET | Refills: 0 | Status: SHIPPED | OUTPATIENT
Start: 2018-07-06 | End: 2019-05-01 | Stop reason: ALTCHOICE

## 2018-07-12 ENCOUNTER — TELEPHONE (OUTPATIENT)
Dept: TRANSPLANT | Facility: CLINIC | Age: 33
End: 2018-07-12

## 2018-07-12 NOTE — TELEPHONE ENCOUNTER
Called Elle to talk with her about IKTP meeting and virtual crossmatch.  Team would like us to look at other donors.   Crossmatch not favorable.

## 2018-07-13 ENCOUNTER — TELEPHONE (OUTPATIENT)
Dept: TRANSPLANT | Facility: CLINIC | Age: 33
End: 2018-07-13

## 2018-07-13 NOTE — TELEPHONE ENCOUNTER
Elle,  I hope your trip is going well.  How wonderful.  The virtual crossmatch showed DSA or donor specific antibodies.  This means that Major recognizes antigens on your cells that his body might not like as much as someone else.  The team asked that we look at other donors because the virtual crossmatch was not favorable.  We would like to do some virtual crossmatches on other donors who might be interested.   Paired exchange is also a good option for you to help Major get a kidney.   I don't want you to think this I bad news.  Major will get a kidney.  We just want him to get a kidney that will be more compatible.   It is difficult to explain this in an email..  Please continue to enjoy your trip and we will talk when you get home.    -----Original Message-----  From: Elle Hill [[email or web address]]   Sent: Thursday, July 12, 2018 5:00 PM  To: Marci Ricardo  Subject: Message regarding crossmatch     Arun Covarrubias,    Now that I m back at the hotel and on our wireless internet, I got your voicemail.  We are 7 hours ahead of [place] time zone.  Remember I m out of the country so I was hoping you can email me the results.  We are all out here as a family for Major s [employer] trip.  If it s too much to email then try calling me again and I ll try to have my phone on me.  We are all antsy about it and I was a little discouraged because I got the voicemail at 4:50pm your time so I just missed you!      Looking forward to hearing from you soon.    Thank you,  Elle

## 2018-08-27 ENCOUNTER — OFFICE VISIT (OUTPATIENT)
Dept: URGENT CARE | Facility: URGENT CARE | Age: 33
End: 2018-08-27
Payer: COMMERCIAL

## 2018-08-27 VITALS
TEMPERATURE: 97.5 F | WEIGHT: 187 LBS | HEART RATE: 68 BPM | SYSTOLIC BLOOD PRESSURE: 116 MMHG | DIASTOLIC BLOOD PRESSURE: 73 MMHG | RESPIRATION RATE: 16 BRPM | BODY MASS INDEX: 31.16 KG/M2 | HEIGHT: 65 IN | OXYGEN SATURATION: 97 %

## 2018-08-27 DIAGNOSIS — J06.9 UPPER RESPIRATORY INFECTION, ACUTE: Primary | ICD-10-CM

## 2018-08-27 LAB
ERYTHROCYTE [DISTWIDTH] IN BLOOD BY AUTOMATED COUNT: 12.9 % (ref 11.6–14.8)
HCT VFR BLD AUTO: 40.2 % (ref 37.7–47.9)
HGB BLD-MCNC: 13.4 G/DL (ref 12.1–16.2)
LYMPHOCYTES # BLD AUTO: 2.1 10*3/UL
LYMPHOCYTES NFR BLD AUTO: 31 % (ref 13–40)
MCH RBC QN AUTO: 31.3 PG (ref 26–34)
MCHC RBC AUTO-ENTMCNC: 33.3 G/DL (ref 31–36)
MCV RBC AUTO: 93.9 FL (ref 80–100)
MONOCYTES # BLD AUTO: 0.5 10*3/UL
MONOCYTES NFR BLD AUTO: 7 % (ref 2–11)
NEUTROPHILS # BLD AUTO: 4.2 10*3/UL
NEUTROPHILS NFR BLD AUTO: 62 % (ref 47–80)
PLATELET # BLD AUTO: 258 10*3/UL (ref 140–440)
PMV BLD AUTO: 8.7 FL
RBC # BLD AUTO: 4.28 10*6/ΜL (ref 4.05–5.48)
WBC # BLD AUTO: 6.8 10*3/UL (ref 4.1–10.9)

## 2018-08-27 PROCEDURE — 36415 COLL VENOUS BLD VENIPUNCTURE: CPT | Performed by: FAMILY MEDICINE

## 2018-08-27 PROCEDURE — 85025 COMPLETE CBC W/AUTO DIFF WBC: CPT | Performed by: FAMILY MEDICINE

## 2018-08-27 PROCEDURE — 99202 OFFICE O/P NEW SF 15 MIN: CPT | Performed by: FAMILY MEDICINE

## 2018-08-27 RX ORDER — FLUTICASONE PROPIONATE 50 MCG
2 SPRAY, SUSPENSION (ML) NASAL DAILY
Qty: 16 G | Refills: 0 | Status: SHIPPED | OUTPATIENT
Start: 2018-08-27 | End: 2019-02-16 | Stop reason: ALTCHOICE

## 2018-08-27 ASSESSMENT — ENCOUNTER SYMPTOMS
SHORTNESS OF BREATH: 0
DIFFICULTY URINATING: 0
DIARRHEA: 0
SORE THROAT: 1
EYE REDNESS: 0
VOMITING: 0
HEADACHES: 1
ABDOMINAL PAIN: 0
COUGH: 1
CHILLS: 0
SINUS PRESSURE: 1
FEVER: 0

## 2018-08-27 ASSESSMENT — PAIN SCALES - GENERAL: PAINLEVEL: 6

## 2018-08-27 NOTE — PROGRESS NOTES
"Subjective      Sierra Kelley is a 33 y.o. female who presents for upper respiratory issues.    Patient presents complaining of nasal congestion, sneezing, facial pressure, and headaches.  She states 4 days ago she developed a sore throat which resolved after a day.  She also had a a dry cough for a couple of days, but this symptom has improved.  She has continued to have nasal congestion and sinus pressure.  No history of fever/chills.  Denies nausea, vomiting, or diarrhea.  She is a  and has been doing a lot of embalming and states that she has been exposed to several chemicals over the last several days.  She has been taking Zyrtec and also ibuprofen for symptomatic relief.              Review of Systems   Constitutional: Negative for chills and fever.   HENT: Positive for congestion, sinus pressure and sore throat.    Eyes: Negative for redness.   Respiratory: Positive for cough. Negative for shortness of breath.    Gastrointestinal: Negative for abdominal pain, diarrhea and vomiting.   Genitourinary: Negative for difficulty urinating.   Skin: Negative for rash.   Neurological: Positive for headaches.       Objective   /73 (BP Location: Right arm, Patient Position: Sitting, Cuff Size: Reg)   Pulse 68   Temp 36.4 °C (97.5 °F) (Temporal)   Resp 16   Ht 1.651 m (5' 5\")   Wt 84.8 kg (187 lb)   SpO2 97%   BMI 31.12 kg/m²     Physical Exam   Constitutional: She appears well-developed and well-nourished. No distress.   HENT:   Head: Normocephalic and atraumatic.   EACs and Tms clear bilaterally  NP: Mild mucosal swelling and erythema with clear drainage  OP: Mild erythema without exudate; moist mucous membranes  Mild bilateral anterior cervical adenopathy   Eyes: Conjunctivae are normal.   Neck: Normal range of motion. Neck supple.   Cardiovascular:   RRR with no murmur appreciated   Pulmonary/Chest:   CTA bilaterally   Abdominal:   Soft, non-tender, normoactive bowel sounds, no " hepatosplenomegaly or masses   Musculoskeletal: Normal range of motion. She exhibits no edema or deformity.   Skin: Skin is warm and dry. No rash noted.   Nursing note and vitals reviewed.      Recent Results (from the past 24 hour(s))   CBC w/auto differential Blood, Venous    Collection Time: 08/27/18  1:16 PM   Result Value Ref Range    WBC 6.8 4.1 - 10.9 10*3/uL    RBC 4.28 4.05 - 5.48 10*6/µL    Hemoglobin 13.4 12.1 - 16.2 g/dL    Hematocrit 40.2 37.7 - 47.9 %    MCV 93.9 80.0 - 100.0 fL    MCH 31.3 26.0 - 34.0 pg    MCHC 33.3 31.0 - 36.0 g/dL    RDW 12.9 11.6 - 14.8 %    Platelets 258 140 - 440 10*3/uL    MPV 8.7 fL    Neutrophils% 62 47 - 80 %    Lymphocytes% 31 13 - 40 %    Monocytes% 7 2 - 11 %    Neutrophils Absolute 4.20 10*3/uL    Lymphocytes Absolute 2.10 10*3/uL    Monocytes Absolute 0.50 10*3/uL           Assessment/Plan   Diagnoses and all orders for this visit:    Upper respiratory infection, acute  -     CBC w/auto differential Blood, Venous        I discussed with the patient that her CBC was normal most likely indicating a viral etiology for her symptoms.  She certainly could have an allergy component as well.  I am going to prescribe Flonase nasal spray for her to use along with the Zyrtec.  She may also take an over-the-counter decongestant.  She may continue ibuprofen as needed.  She is to rest and push fluids.  Information Care Sheet given.  Follow-up if no improvement or if any other concerns.    HUDSON ARCHER MD

## 2018-11-16 ENCOUNTER — TELEPHONE (OUTPATIENT)
Dept: TRANSPLANT | Facility: CLINIC | Age: 33
End: 2018-11-16

## 2018-11-16 NOTE — TELEPHONE ENCOUNTER
Received voice message and Email confirming YES she's interested in PEP.Told her we'll contact her once we will learn more re:listing.

## 2019-02-12 ENCOUNTER — TRANSFERRED RECORDS (OUTPATIENT)
Dept: HEALTH INFORMATION MANAGEMENT | Facility: CLINIC | Age: 34
End: 2019-02-12

## 2019-02-12 LAB — PAP-ABSTRACT: NORMAL

## 2019-02-16 ENCOUNTER — OFFICE VISIT (OUTPATIENT)
Dept: URGENT CARE | Facility: URGENT CARE | Age: 34
End: 2019-02-16
Payer: COMMERCIAL

## 2019-02-16 VITALS
WEIGHT: 199 LBS | TEMPERATURE: 97.8 F | DIASTOLIC BLOOD PRESSURE: 75 MMHG | BODY MASS INDEX: 33.12 KG/M2 | HEART RATE: 95 BPM | SYSTOLIC BLOOD PRESSURE: 123 MMHG | RESPIRATION RATE: 18 BRPM | OXYGEN SATURATION: 96 %

## 2019-02-16 DIAGNOSIS — J20.0 ACUTE BRONCHITIS DUE TO MYCOPLASMA PNEUMONIAE: ICD-10-CM

## 2019-02-16 DIAGNOSIS — R05.9 COUGH: Primary | ICD-10-CM

## 2019-02-16 LAB
ERYTHROCYTE [DISTWIDTH] IN BLOOD BY AUTOMATED COUNT: 13.2 % (ref 11.6–14.8)
FLUAV AG NPH QL IA: NEGATIVE
FLUBV AG NPH QL IA: NEGATIVE
HCT VFR BLD AUTO: 40.4 % (ref 37.7–47.9)
HGB BLD-MCNC: 13.5 G/DL (ref 12.1–16.2)
LYMPHOCYTES # BLD AUTO: 1.2 10*3/UL
LYMPHOCYTES NFR BLD AUTO: 16 % (ref 13–40)
MCH RBC QN AUTO: 31.1 PG (ref 26–34)
MCHC RBC AUTO-ENTMCNC: 33.4 G/DL (ref 31–36)
MCV RBC AUTO: 93.1 FL (ref 80–100)
MONOCYTES # BLD AUTO: 0.5 10*3/UL
MONOCYTES NFR BLD AUTO: 7 % (ref 2–11)
NEUTROPHILS # BLD AUTO: 5.9 10*3/UL
NEUTROPHILS NFR BLD AUTO: 77 % (ref 47–80)
PLATELET # BLD AUTO: 223 10*3/UL (ref 140–440)
PMV BLD AUTO: 8.6 FL
RBC # BLD AUTO: 4.34 10*6/ΜL (ref 4.05–5.48)
WBC # BLD AUTO: 7.6 10*3/UL (ref 4.1–10.9)

## 2019-02-16 PROCEDURE — 94640 AIRWAY INHALATION TREATMENT: CPT | Performed by: PHYSICIAN ASSISTANT

## 2019-02-16 PROCEDURE — 85025 COMPLETE CBC W/AUTO DIFF WBC: CPT | Performed by: PHYSICIAN ASSISTANT

## 2019-02-16 PROCEDURE — 87804 INFLUENZA ASSAY W/OPTIC: CPT | Mod: QW | Performed by: PHYSICIAN ASSISTANT

## 2019-02-16 PROCEDURE — 99213 OFFICE O/P EST LOW 20 MIN: CPT | Mod: 25 | Performed by: PHYSICIAN ASSISTANT

## 2019-02-16 PROCEDURE — 36415 COLL VENOUS BLD VENIPUNCTURE: CPT | Performed by: PHYSICIAN ASSISTANT

## 2019-02-16 RX ORDER — AZITHROMYCIN 250 MG/1
TABLET, FILM COATED ORAL
Qty: 6 TABLET | Refills: 0 | Status: SHIPPED | OUTPATIENT
Start: 2019-02-16 | End: 2019-05-01 | Stop reason: ALTCHOICE

## 2019-02-16 RX ORDER — PREDNISONE 20 MG/1
20 TABLET ORAL DAILY
Qty: 5 TABLET | Refills: 0 | Status: SHIPPED | OUTPATIENT
Start: 2019-02-16 | End: 2019-02-21

## 2019-02-16 RX ORDER — ALBUTEROL SULFATE 0.83 MG/ML
2.5 SOLUTION RESPIRATORY (INHALATION) ONCE
Status: COMPLETED | OUTPATIENT
Start: 2019-02-16 | End: 2019-02-16

## 2019-02-16 RX ORDER — ALBUTEROL SULFATE 90 UG/1
2 INHALANT RESPIRATORY (INHALATION) EVERY 4 HOURS PRN
Qty: 1 INHALER | Refills: 1 | Status: SHIPPED | OUTPATIENT
Start: 2019-02-16 | End: 2019-03-18

## 2019-02-16 RX ADMIN — ALBUTEROL SULFATE 2.5 MG: 0.83 SOLUTION RESPIRATORY (INHALATION) at 07:29

## 2019-02-16 ASSESSMENT — ENCOUNTER SYMPTOMS
EYE PAIN: 0
SORE THROAT: 1
SHORTNESS OF BREATH: 0
SINUS PRESSURE: 0
WHEEZING: 0
ARTHRALGIAS: 0
FEVER: 0
ADENOPATHY: 1
COUGH: 1
PALPITATIONS: 0
CHILLS: 0
HEADACHES: 1

## 2019-02-16 ASSESSMENT — PAIN SCALES - GENERAL: PAINLEVEL: 7

## 2019-02-16 NOTE — PATIENT INSTRUCTIONS
Patient Education     Acute Bronchitis, Adult  Acute bronchitis is when air tubes (bronchi) in the lungs suddenly get swollen. The condition can make it hard to breathe. It can also cause these symptoms:  · A cough.  · Coughing up clear, yellow, or green mucus.  · Wheezing.  · Chest congestion.  · Shortness of breath.  · A fever.  · Body aches.  · Chills.  · A sore throat.    Follow these instructions at home:  Medicines  · Take over-the-counter and prescription medicines only as told by your doctor.  · If you were prescribed an antibiotic medicine, take it as told by your doctor. Do not stop taking the antibiotic even if you start to feel better.  General instructions  · Rest.  · Drink enough fluids to keep your pee (urine) clear or pale yellow.  · Avoid smoking and secondhand smoke. If you smoke and you need help quitting, ask your doctor. Quitting will help your lungs heal faster.  · Use an inhaler, cool mist vaporizer, or humidifier as told by your doctor.  · Keep all follow-up visits as told by your doctor. This is important.  How is this prevented?  To lower your risk of getting this condition again:  · Wash your hands often with soap and water. If you cannot use soap and water, use hand .  · Avoid contact with people who have cold symptoms.  · Try not to touch your hands to your mouth, nose, or eyes.  · Make sure to get the flu shot every year.    Contact a doctor if:  · Your symptoms do not get better in 2 weeks.  Get help right away if:  · You cough up blood.  · You have chest pain.  · You have very bad shortness of breath.  · You become dehydrated.  · You faint (pass out) or keep feeling like you are going to pass out.  · You keep throwing up (vomiting).  · You have a very bad headache.  · Your fever or chills gets worse.  This information is not intended to replace advice given to you by your health care provider. Make sure you discuss any questions you have with your health care  provider.  Document Released: 06/05/2009 Document Revised: 07/26/2017 Document Reviewed: 06/07/2017  [employer] Interactive Patient Education © 2018 [employer]

## 2019-02-16 NOTE — PROGRESS NOTES
Clinic Note     SUBJECTIVE    Chief Complaint:  Chief Complaint   Patient presents with   • Sore Throat     cough and mild headaches last few days, yesterday cough became worse and more productive, woke up with very painful and swollen lymph nodes, painful to drink water, can't get a good breath, took mucinex with minimal relief       HPI:    Sierra Kelley is a 34 y.o. female who presents for Sore Throat (cough and mild headaches last few days, yesterday cough became worse and more productive, woke up with very painful and swollen lymph nodes, painful to drink water, can't get a good breath, took mucinex with minimal relief)  .     The following have been reviewed and updated as appropriate in this visit:         No Known Allergies  Current Outpatient Medications   Medication Sig Dispense Refill   • ALPRAZolam (XANAX) 0.5 mg tablet TAKE 1 TABLET BY MOUTH UP TO THREE TIMES DAILY AS NEEDED FOR ANXIETY 30 tablet 0   • ascorbic acid, vitamin C, (ascorbic acid with kimberli hips) 500 mg tablet Take 500 mg by mouth daily.     • calcium carbonate-vitamin D3 600 mg(1,500mg) -400 unit per tablet Take 1 tablet by mouth daily.     • Lactobacillus acidophilus (ACIDOPHILUS ORAL) Take 1 tab/cap by mouth daily.     • magnesium oxide 250 mg tablet Take 250 mg by mouth daily.     • omega-3 fatty acids-fish oil (FISH OIL) 340-1,000 mg capsule Take 1 tab/cap by mouth daily.     • SYNTHROID 112 mcg tablet TAKE 1 TABLET BY MOUTH DAILY 30 tablet 11   • albuterol HFA (PROVENTIL HFA;VENTOLIN HFA) 90 mcg/actuation inhaler Inhale 2 puffs every 4 (four) hours as needed for wheezing or shortness of breath (cough) 1 Inhaler 1   • azithromycin (ZITHROMAX Z-EVGENY) 250 mg tablet Take 2 tablets the first day, then 1 tablet daily for 4 days. 6 tablet 0   • predniSONE (DELTASONE) 20 mg tablet Take 1 tablet (20 mg total) by mouth daily for 5 days 5 tablet 0     No current facility-administered medications for this visit.      Past Medical History:    Diagnosis Date   • Anxiety    • Chronic headache    • Depression    • Hypothyroid      Past Surgical History:   Procedure Laterality Date   • THYROIDECTOMY  2003     Family History   Problem Relation Age of Onset   • Skin cancer Paternal Grandfather      Social History     Socioeconomic History   • Marital status:      Spouse name: Not on file   • Number of children: Not on file   • Years of education: 16   • Highest education level: Not on file   Social Needs   • Financial resource strain: Not on file   • Food insecurity - worry: Not on file   • Food insecurity - inability: Not on file   • Transportation needs - medical: Not on file   • Transportation needs - non-medical: Not on file   Occupational History   • Occupation:    Tobacco Use   • Smoking status: Never Smoker   • Smokeless tobacco: Never Used   Substance and Sexual Activity   • Alcohol use: Yes   • Drug use: No   • Sexual activity: Defer   Other Topics Concern   • Not on file   Social History Narrative   • Not on file       Review of Systems:  Review of Systems   Constitutional: Negative for chills and fever.   HENT: Positive for sore throat. Negative for ear pain and sinus pressure.    Eyes: Negative for pain and visual disturbance.   Respiratory: Positive for cough. Negative for shortness of breath and wheezing.    Cardiovascular: Negative for chest pain and palpitations.   Musculoskeletal: Negative for arthralgias.   Skin: Negative for rash.   Neurological: Positive for headaches.   Hematological: Positive for adenopathy (painful).   All other systems reviewed and are negative.      OBJECTIVE     Vitals:  /75   Pulse 95   Temp 36.6 °C (97.8 °F) (Temporal)   Resp 18   Wt 90.3 kg (199 lb)   SpO2 96%   BMI 33.12 kg/m²     PE:  Physical Exam   Constitutional: She appears well-developed and well-nourished.   HENT:   Head: Normocephalic and atraumatic.   Right Ear: Tympanic membrane normal.   Left Ear: Tympanic membrane  normal.   Mouth/Throat: Uvula is midline and mucous membranes are normal. Posterior oropharyngeal erythema present. No oropharyngeal exudate.   Eyes: Pupils are equal, round, and reactive to light. EOM are normal.   Neck: Neck supple.   Cardiovascular: Normal rate and regular rhythm.   Pulmonary/Chest: Effort normal. She has no decreased breath sounds. She has wheezes in the left lower field. She has no rhonchi. She has no rales.         Recent Results (from the past 12 hour(s))   Rapid influenza A/B antigens    Collection Time: 02/16/19  7:30 AM   Result Value Ref Range    Influenza A Ag, EIA Negative Negative    Influenza B Ag, EIA Negative Negative   CBC w/auto differential Blood, Venous    Collection Time: 02/16/19  7:30 AM   Result Value Ref Range    WBC 7.6 4.1 - 10.9 10*3/uL    RBC 4.34 4.05 - 5.48 10*6/µL    Hemoglobin 13.5 12.1 - 16.2 g/dL    Hematocrit 40.4 37.7 - 47.9 %    MCV 93.1 80.0 - 100.0 fL    MCH 31.1 26.0 - 34.0 pg    MCHC 33.4 31.0 - 36.0 g/dL    RDW 13.2 11.6 - 14.8 %    Platelets 223 140 - 440 10*3/uL    MPV 8.6 fL    Neutrophils% 77 47 - 80 %    Lymphocytes% 16 13 - 40 %    Monocytes% 7 2 - 11 %    Neutrophils Absolute 5.90 10*3/uL    Lymphocytes Absolute 1.20 10*3/uL    Monocytes Absolute 0.50 10*3/uL         ASSESSMENT:    Sierra was seen today for sore throat.    Diagnoses and all orders for this visit:    Cough  -     albuterol 2.5 mg/3 mL nebulizer solution 2.5 mg  -     Rapid influenza A/B antigens  -     CBC w/auto differential Blood, Venous    Acute bronchitis due to Mycoplasma pneumoniae  -     albuterol HFA (PROVENTIL HFA;VENTOLIN HFA) 90 mcg/actuation inhaler; Inhale 2 puffs every 4 (four) hours as needed for wheezing or shortness of breath (cough)  -     azithromycin (ZITHROMAX Z-EVGENY) 250 mg tablet; Take 2 tablets the first day, then 1 tablet daily for 4 days.  -     predniSONE (DELTASONE) 20 mg tablet; Take 1 tablet (20 mg total) by mouth daily for 5 days          PLAN  Patient  advised to complete antibiotics and prednisone as prescribed; may use cough medication as needed; encouraged rest, humidifier at bedside; return if not getting better. Recheck here or ER immediately if fever, chills, vomiting, increased shortness of breath.    Patient verbalizes understanding of above information and will contact [facility] with any further questions or concerns.    LINDSAY PAZ

## 2019-02-28 ENCOUNTER — TELEPHONE (OUTPATIENT)
Dept: NEPHROLOGY | Facility: CLINIC | Age: 34
End: 2019-02-28

## 2019-02-28 DIAGNOSIS — Z00.5 TRANSPLANT DONOR EVALUATION: Primary | ICD-10-CM

## 2019-02-28 NOTE — TELEPHONE ENCOUNTER
Wants to cont process for PEP. Eval labs will be  within next few days.Sent her via Email eval labs including orders for 24hr CrCl.Will get BP,ht and wt also.Will contact me when she's completed tests.

## 2019-03-01 ENCOUNTER — TELEPHONE (OUTPATIENT)
Dept: FAMILY MEDICINE | Facility: CLINIC | Age: 34
End: 2019-03-01

## 2019-03-01 NOTE — TELEPHONE ENCOUNTER
Caller would like to discuss (a) appointment Writer has advised caller of a callback from within 24 hours.    Patient: Sierra Kelley    Caller Name (Last and first, relation/role): self    Name of Facility: na    Callback Number: [phone]    Best Availability: any    Fax Number: na    Additional Info: Patient states is in the process of kidney donation through [facility] and they have some testing she would like to have done here, wants to know if is possible, states had discussed with provider but wasn't clear.    Did you confirm the message with the caller: Yes    Is it okay that the nurse communicates your response through MyChart? No, prefers phone call.

## 2019-03-01 NOTE — TELEPHONE ENCOUNTER
Patient has orders from [facility] to do 24hr urine and serum creatinine.  She was told to bring the orders to the lab and they will help her with the container, etc.

## 2019-03-04 ENCOUNTER — TELEPHONE (OUTPATIENT)
Dept: TRANSPLANT | Facility: CLINIC | Age: 34
End: 2019-03-04

## 2019-03-04 NOTE — TELEPHONE ENCOUNTER
I have sent email to the patient my purpose is to introduce myself as her new Coordinator and to provide her with consent forms for [facility].  I have asked her to let me know when she is available for a phone consult regarding details about Kidney Paired exchange program.

## 2019-03-05 ENCOUNTER — TELEPHONE (OUTPATIENT)
Dept: TRANSPLANT | Facility: CLINIC | Age: 34
End: 2019-03-05

## 2019-03-05 NOTE — TELEPHONE ENCOUNTER
I was informed by Elicia that Elle is going to be activated in NK[facility] in mid April.  I sent her a note reminding her to complete NLDA paperwork.  Also, informed her of wage reimbursement program through [facility] and asked that she let me know if her income is within their income guidelines.  The following was sent to her:  Anderson Almeida    This is Brynn Farmer, and I am the  you saw in clinic one year ago, when you were here for your donor evaluation.  I am sending this email, but please know that we can also discuss all via phone also and my contact information will be at the end of this email!  Elicia let me know that you are going to be activated in the [facility] system in April.  That s great.  Do you have any questions or concerns?  I wanted to discuss two options for financial assistance for you.  The first is the National [facility] (NLDAC), which covers donor travel expenses (hotel, gas, airfare, meals for donor and a caregiver).  At the bottom of this string of emails, there is additional information on this vasquez.  I am going to send the necessary paperwork that you would need to complete and return to me.  I know you were working on it a year ago, but I never received it.  Since the forms have been updated, I ll attach them here for you to complete and send back to me, along with proof of household income.  Once it is returned, I will complete the vasquez request and submit it.       The second thing I wanted to let you know about is a new wage reimbursement program that is available for donors who are going through [facility] This program is set up through the [facility] ([facility]) with the goal of ensuring that financial burdens due to kidney donation are minimized. Donors that make $62,000 or less per year may be eligible for reimbursement of up to 4 weeks of lost wages, with reimbursement for weeks 3 & 4 requiring clearance from the transplant center. Lost wages  are defined and limited to documented expected earnings that a donor lost due to time missed at work as a result of the kidney donation  Is your annual income is less than $62,000.00?  If so, please let me know, and also please apply for the wage reimbursement program.  To do so, go to  [email or web address]  and use the salmon-colored button that says  click here to see if you re qualified to donate This will get you registered into NKR s system so that you can participate in their lost wage reimbursement program.  This is something that you need to do (I can t do it for you!).   Also, please e-mail me a copy of your 2018 Federal Income Tax Return and a copy of your pay stub.  I am cc ing my colleague, Mitzi Grant, who can also assist with this process.  This is a newer program, so let me know if you run into problems.      This is a ton of information.  Please feel free to call me with questions or concerns.    Kind Regards    A:  Could benefit from Financial assistance and information on grants was provided to her.  P:  I will apply for NLDAC once worksheets are received;  If income is within income guidelines for wage reimbursement, will also facilitate this process as well.  Donor /MICHELLE will remain available to assist with advocacy needs and psychosocial needs, as necessary.

## 2019-03-06 ENCOUNTER — TELEPHONE (OUTPATIENT)
Dept: TRANSPLANT | Facility: CLINIC | Age: 34
End: 2019-03-06

## 2019-03-07 NOTE — TELEPHONE ENCOUNTER
I spoke to Elle Hill regarding information on Kidney paired donation.  Has offered to be donor to Paired exchange program for her step son Colin Hill.  Timing will be available 3rd week in April for active listing due to family vacation.   Ab was also present for phone conversation today via speaker phone.  Her workplace is supportive and we discussed typical timetable of a match offer to surgery.      They are incompatible by DSA.    I reviewed details pertaining to the Paired Exchange programs.       1. National Kidney Registry, UNOS D and the Internal Exchange at the [facility]    Matching Procedure and Logistics    Reviewed that donors and candidates do not choose their match and that they may decline a match. Explained examples of potential chain/swap scenarios and demonstrated how more than one candidate can receive a transplant in these exchanges.   Reviewed that the KPD waiting time is unknown, the intended recipient's antibody panel and reviewed their ABO match power.   Frequent lab draws are necessary in the KPD programs. APD and [facility] will send a kit when we initiate the listing to store the blood by freezing the sample and use it for exploratory crossmatches. Once a match offer is made, a fresh blood sample will be needed for the final confirmatory compatibility testing, as well as infectious disease testing.  I reviewed billing policy for the donor evaluation and for the KPD program.  Bridging  KPD programs may need to have the donor wait a period of time after the recipient receives their kidney, in order to determine matching and logistics for the next cluster of a chain. If donor consents to be a bridge donor, the time waiting for the swap cluster to take place is unknown. The donor stated that they are okay being a bridge donor if needed. Further blood work may be needed if they are waiting to bridge one year after the initial evaluation.  Unexpected Outcome  I discussed  possibility of an unexpected event on the day of transplant. I explained how their donated kidney would be backed up here locally as well as in the destination city in the event that the matched recipient is unable to receive the donated kidney.   I reviewed the KPD programs remedy for failed KPD exchanges, and the remedy does not include any additional priority for the paired candidate on the  donor waiting list. The outcome of the matched recipient may be different from the intended recipient's outcome.  Shipping  The kidney's are transported to the matched recipient's in the exchanges via an approved  service to the airport and then flown fixed wing to the intended destination.GPS devices are used in all the [facility] exchanges for close monitoring. Risk included in shipping include damage or loss related to unforseen situations; car crash, airplane crash, terrorist events, etc.   Communication  Donors can communicate with the recipient by giving all correspondence to the Transplant Coordinator, omitting all personal identifiers. The Transplant Coordinator will review and deliver to the recipients Coordinator.  Rights  Donors have a right to withdraw from participation in the KPD program at any time.     Donor has signed consent for:  *National Kidney Registry  *[employer] for Organ Sharing, KPD  *[place] for Paired Donation  *[facility] Internal Exchange  *Bridge Donation  *Shipping Risks  *Release of Information form used for sharing evaluation clinical data to recipient transplant center.     Questions answered.    Elicia Centeno RN  Living Donor Coordinator  2019 3:47 PM

## 2019-03-08 ENCOUNTER — APPOINTMENT (OUTPATIENT)
Dept: LAB | Facility: CLINIC | Age: 34
End: 2019-03-08
Payer: COMMERCIAL

## 2019-03-08 DIAGNOSIS — Z00.5 WILLING TO BE KIDNEY DONOR: Primary | ICD-10-CM

## 2019-03-08 LAB
ALBUMIN SERPL-MCNC: 4.5 G/DL (ref 3.5–5.3)
ALP SERPL-CCNC: 51 U/L (ref 37–98)
ALT SERPL-CCNC: 45 U/L (ref 0–52)
ANION GAP SERPL CALC-SCNC: 7 MMOL/L (ref 3–11)
AST SERPL-CCNC: 66 U/L (ref 0–39)
BILIRUB SERPL-MCNC: 0.74 MG/DL (ref 0–1.4)
BUN SERPL-MCNC: 13 MG/DL (ref 7–25)
CALCIUM ALBUM COR SERPL-MCNC: 9 MG/DL (ref 8.6–10.3)
CALCIUM SERPL-MCNC: 9.4 MG/DL (ref 8.6–10.3)
CHLORIDE SERPL-SCNC: 101 MMOL/L (ref 98–107)
CHOLEST SERPL-MCNC: 157 MG/DL (ref 0–199)
CO2 SERPL-SCNC: 28 MMOL/L (ref 21–32)
CREAT SERPL-MCNC: 0.77 MG/DL (ref 0.6–1.2)
CREAT UR-MCNC: 21.6 MG/DL
FASTING STATUS PATIENT QL REPORTED: YES
GFR SERPL CREATININE-BSD FRML MDRD: 101 ML/MIN/1.73M*2
GLUCOSE SERPL-MCNC: 85 MG/DL (ref 70–105)
HBV SURFACE AB SER QL: 67.3 MIU/ML
HBV SURFACE AG SER QL: NORMAL
HCG SERPL-ACNC: 1 MIU/ML
HCV AB SER QL: NORMAL
HDLC SERPL-MCNC: 62 MG/DL
HIV 1+2 AB+HIV1 P24 AG SERPL QL IA: NORMAL
LDLC SERPL CALC-MCNC: 87 MG/DL (ref 20–99)
MICROALBUMIN UR-MCNC: <7 MG/L (ref 0–30)
POTASSIUM SERPL-SCNC: 3.9 MMOL/L (ref 3.5–5.1)
PROT 24H UR-MCNC: <4 MG/DL (ref 0–21.4)
PROT SERPL-MCNC: 7.1 G/DL (ref 6–8.3)
SODIUM SERPL-SCNC: 136 MMOL/L (ref 135–145)
T PALLIDUM IGG SER QL IA: NORMAL
TRIGL SERPL-MCNC: 40 MG/DL
URATE SERPL-MCNC: 5.6 MG/DL (ref 2.3–6.6)

## 2019-03-08 PROCEDURE — 82570 ASSAY OF URINE CREATININE: CPT | Performed by: INTERNAL MEDICINE

## 2019-03-08 PROCEDURE — 86780 TREPONEMA PALLIDUM: CPT | Performed by: INTERNAL MEDICINE

## 2019-03-08 PROCEDURE — 86480 TB TEST CELL IMMUN MEASURE: CPT | Performed by: INTERNAL MEDICINE

## 2019-03-08 PROCEDURE — 86665 EPSTEIN-BARR CAPSID VCA: CPT | Performed by: INTERNAL MEDICINE

## 2019-03-08 PROCEDURE — 87389 HIV-1 AG W/HIV-1&-2 AB AG IA: CPT | Performed by: INTERNAL MEDICINE

## 2019-03-08 PROCEDURE — 86644 CMV ANTIBODY: CPT | Performed by: INTERNAL MEDICINE

## 2019-03-08 PROCEDURE — 86803 HEPATITIS C AB TEST: CPT | Performed by: INTERNAL MEDICINE

## 2019-03-08 PROCEDURE — 86664 EPSTEIN-BARR NUCLEAR ANTIGEN: CPT | Performed by: INTERNAL MEDICINE

## 2019-03-08 PROCEDURE — 80053 COMPREHEN METABOLIC PANEL: CPT | Performed by: INTERNAL MEDICINE

## 2019-03-08 PROCEDURE — 84550 ASSAY OF BLOOD/URIC ACID: CPT | Performed by: INTERNAL MEDICINE

## 2019-03-08 PROCEDURE — 80061 LIPID PANEL: CPT | Performed by: INTERNAL MEDICINE

## 2019-03-08 PROCEDURE — 84702 CHORIONIC GONADOTROPIN TEST: CPT | Performed by: INTERNAL MEDICINE

## 2019-03-08 PROCEDURE — 36415 COLL VENOUS BLD VENIPUNCTURE: CPT | Performed by: INTERNAL MEDICINE

## 2019-03-08 PROCEDURE — 82043 UR ALBUMIN QUANTITATIVE: CPT | Performed by: INTERNAL MEDICINE

## 2019-03-08 PROCEDURE — 86706 HEP B SURFACE ANTIBODY: CPT | Performed by: INTERNAL MEDICINE

## 2019-03-08 PROCEDURE — 86704 HEP B CORE ANTIBODY TOTAL: CPT | Performed by: INTERNAL MEDICINE

## 2019-03-08 PROCEDURE — 87340 HEPATITIS B SURFACE AG IA: CPT | Performed by: INTERNAL MEDICINE

## 2019-03-08 PROCEDURE — 84156 ASSAY OF PROTEIN URINE: CPT | Performed by: INTERNAL MEDICINE

## 2019-03-09 LAB
CMV IGG SERPL QL IA: POSITIVE
EBV NA AB SER QL: POSITIVE
EBV VCA IGG SER QL: POSITIVE
EBV VCA IGM SER QL: NEGATIVE
HBV CORE AB SERPL QL IA: NEGATIVE
IMMUNOLOGIST REVIEW: NORMAL

## 2019-03-11 ENCOUNTER — APPOINTMENT (OUTPATIENT)
Dept: LAB | Facility: CLINIC | Age: 34
End: 2019-03-11
Payer: COMMERCIAL

## 2019-03-11 DIAGNOSIS — Z00.5 WILLING TO BE KIDNEY DONOR: ICD-10-CM

## 2019-03-11 LAB
COLLECT DURATION TIME UR: 24 H
COLLECT DURATION TIME UR: 24 H
CREAT 24H UR-MCNC: 39.1 MG/DL
CREAT 24H UR-MRATE: 1.2 G/24H (ref 0.5–1.4)
CREAT CL 24H UR+SERPL-VRATE: 108.43 ML/MIN
CREAT CL 24H UR+SERPL-VRATE: 91.96 ML/MIN/1.73M*2 (ref 65–99)
CREAT SERPL-MCNC: 0.72 MG/DL (ref 0.6–1.2)
CREATINE SERPL-MCNC: 0.77 MG/DL (ref 0.5–1.1)
GFR SERPL CREATININE-BSD FRML MDRD: 110 ML/MIN/1.73M*2
M TB TUBERC IFN-G BLD QL: NEGATIVE
PROT 24H UR-MCNC: <4 MG/DL (ref 0–21.4)
PROT 24H UR-MRATE: NORMAL MG/24 H (ref 50–80)
SPECIMEN VOL 24H UR: 3075 ML
SPECIMEN VOL 24H UR: 3075 ML
TBGOLD AG-NIL: 0.01 IU/ML

## 2019-03-11 PROCEDURE — 36415 COLL VENOUS BLD VENIPUNCTURE: CPT | Performed by: INTERNAL MEDICINE

## 2019-03-11 PROCEDURE — 84156 ASSAY OF PROTEIN URINE: CPT | Performed by: INTERNAL MEDICINE

## 2019-03-11 PROCEDURE — 82575 CREATININE CLEARANCE TEST: CPT | Performed by: INTERNAL MEDICINE

## 2019-04-02 PROBLEM — E03.8 OTHER SPECIFIED HYPOTHYROIDISM: Status: ACTIVE | Noted: 2019-04-02

## 2019-04-02 PROBLEM — F39 MOOD DISORDER (CMS/HCC): Status: ACTIVE | Noted: 2019-04-02

## 2019-04-02 PROBLEM — E66.811 CLASS 1 OBESITY WITHOUT SERIOUS COMORBIDITY WITH BODY MASS INDEX (BMI) OF 33.0 TO 33.9 IN ADULT: Status: ACTIVE | Noted: 2019-04-02

## 2019-04-03 ENCOUNTER — OFFICE VISIT (OUTPATIENT)
Dept: INTERNAL MEDICINE | Facility: CLINIC | Age: 34
End: 2019-04-03
Payer: COMMERCIAL

## 2019-04-03 VITALS
HEIGHT: 65 IN | DIASTOLIC BLOOD PRESSURE: 64 MMHG | TEMPERATURE: 98.1 F | BODY MASS INDEX: 31.49 KG/M2 | RESPIRATION RATE: 16 BRPM | OXYGEN SATURATION: 97 % | WEIGHT: 189 LBS | HEART RATE: 74 BPM | SYSTOLIC BLOOD PRESSURE: 118 MMHG

## 2019-04-03 DIAGNOSIS — E66.811 CLASS 1 OBESITY WITHOUT SERIOUS COMORBIDITY WITH BODY MASS INDEX (BMI) OF 33.0 TO 33.9 IN ADULT, UNSPECIFIED OBESITY TYPE: ICD-10-CM

## 2019-04-03 DIAGNOSIS — E03.8 OTHER SPECIFIED HYPOTHYROIDISM: Primary | ICD-10-CM

## 2019-04-03 DIAGNOSIS — F39 MOOD DISORDER (CMS/HCC): ICD-10-CM

## 2019-04-03 PROCEDURE — 99999 PR OFFICE/OUTPT VISIT,PROCEDURE ONLY: CPT | Performed by: INTERNAL MEDICINE

## 2019-04-03 ASSESSMENT — PAIN SCALES - GENERAL: PAINLEVEL: 0-NO PAIN

## 2019-04-04 NOTE — PROGRESS NOTES
Pt came in for a nurse visit, vitals were completed and sent to [facility], as she was awaiting donating her kidney.

## 2019-04-10 ENCOUNTER — TELEPHONE (OUTPATIENT)
Dept: TRANSPLANT | Facility: CLINIC | Age: 34
End: 2019-04-10
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
## 2019-04-15 ENCOUNTER — APPOINTMENT (OUTPATIENT)
Dept: LAB | Facility: CLINIC | Age: 34
End: 2019-04-15
Payer: COMMERCIAL

## 2019-04-15 DIAGNOSIS — Z00.5 WILLING TO BE KIDNEY DONOR: Primary | ICD-10-CM

## 2019-04-15 LAB — AST SERPL-CCNC: 23 U/L (ref 0–39)

## 2019-04-15 PROCEDURE — 36415 COLL VENOUS BLD VENIPUNCTURE: CPT | Performed by: INTERNAL MEDICINE

## 2019-04-15 PROCEDURE — 84450 TRANSFERASE (AST) (SGOT): CPT | Performed by: INTERNAL MEDICINE

## 2019-04-22 ENCOUNTER — APPOINTMENT (OUTPATIENT)
Dept: LAB | Facility: CLINIC | Age: 34
End: 2019-04-22
Payer: COMMERCIAL

## 2019-04-22 DIAGNOSIS — Z00.5 WILLING TO BE KIDNEY DONOR: Primary | ICD-10-CM

## 2019-04-22 PROCEDURE — 36415 COLL VENOUS BLD VENIPUNCTURE: CPT | Performed by: INTERNAL MEDICINE

## 2019-04-26 ENCOUNTER — TELEPHONE (OUTPATIENT)
Dept: TRANSPLANT | Facility: CLINIC | Age: 34
End: 2019-04-26

## 2019-04-26 NOTE — TELEPHONE ENCOUNTER
I called Elle to inform her of paired exchange offer for surgery date May 22.  She is good with that timing.  She would like to have the preop the day before as they live in [place].   I will keep her posted, she will tell Major's Mom.  I told her to be cautiously optimistic as there a lot of patients involved in the swap.  We should be able to firm things up by Wednesday after the crossmatches are back.    She was very happy to hear that she would be bridging slightly being the donor the following day.  I will start plans for her surgery itinerary.

## 2019-05-02 ENCOUNTER — APPOINTMENT (OUTPATIENT)
Dept: LAB | Facility: CLINIC | Age: 34
End: 2019-05-02
Payer: COMMERCIAL

## 2019-05-02 ENCOUNTER — ORGAN (OUTPATIENT)
Dept: TRANSPLANT | Facility: CLINIC | Age: 34
End: 2019-05-02

## 2019-05-02 DIAGNOSIS — E03.9 HYPOTHYROIDISM, UNSPECIFIED TYPE: Primary | ICD-10-CM

## 2019-05-02 DIAGNOSIS — E03.9 HYPOTHYROIDISM, UNSPECIFIED TYPE: ICD-10-CM

## 2019-05-02 LAB — TSH SERPL DL<=0.05 MIU/L-ACNC: 3.04 UIU/ML (ref 0.34–4.82)

## 2019-05-02 PROCEDURE — 36415 COLL VENOUS BLD VENIPUNCTURE: CPT | Performed by: NURSE PRACTITIONER

## 2019-05-02 PROCEDURE — 84443 ASSAY THYROID STIM HORMONE: CPT | Performed by: NURSE PRACTITIONER

## 2019-05-03 ENCOUNTER — OFFICE VISIT (OUTPATIENT)
Dept: INTERNAL MEDICINE | Facility: CLINIC | Age: 34
End: 2019-05-03
Payer: COMMERCIAL

## 2019-05-03 VITALS
OXYGEN SATURATION: 97 % | HEART RATE: 65 BPM | TEMPERATURE: 97.9 F | DIASTOLIC BLOOD PRESSURE: 70 MMHG | BODY MASS INDEX: 32.95 KG/M2 | WEIGHT: 198 LBS | SYSTOLIC BLOOD PRESSURE: 110 MMHG

## 2019-05-03 DIAGNOSIS — F43.0 ANXIETY AS ACUTE REACTION TO EXCEPTIONAL STRESS: ICD-10-CM

## 2019-05-03 DIAGNOSIS — Z00.5 EXAMINATION OF POTENTIAL DONOR OF ORGAN AND TISSUE: Primary | ICD-10-CM

## 2019-05-03 DIAGNOSIS — F41.1 ANXIETY AS ACUTE REACTION TO EXCEPTIONAL STRESS: ICD-10-CM

## 2019-05-03 DIAGNOSIS — E03.8 ADULT ONSET HYPOTHYROIDISM: Primary | ICD-10-CM

## 2019-05-03 PROCEDURE — 99395 PREV VISIT EST AGE 18-39: CPT | Performed by: NURSE PRACTITIONER

## 2019-05-03 PROCEDURE — 99213 OFFICE O/P EST LOW 20 MIN: CPT | Mod: 25 | Performed by: NURSE PRACTITIONER

## 2019-05-03 RX ORDER — ALPRAZOLAM 0.25 MG/1
0.25 TABLET ORAL NIGHTLY PRN
COMMUNITY
End: 2019-05-03 | Stop reason: SDUPTHER

## 2019-05-03 RX ORDER — ALPRAZOLAM 0.25 MG/1
0.25 TABLET ORAL NIGHTLY PRN
Qty: 30 TABLET | Refills: 0 | Status: SHIPPED | OUTPATIENT
Start: 2019-05-03 | End: 2019-10-31 | Stop reason: SDUPTHER

## 2019-05-03 RX ORDER — LEVOTHYROXINE SODIUM 125 UG/1
125 TABLET ORAL DAILY
Qty: 30 TABLET | Refills: 11 | Status: SHIPPED | OUTPATIENT
Start: 2019-05-03 | End: 2020-04-17

## 2019-05-03 ASSESSMENT — PAIN SCALES - GENERAL: PAINLEVEL: 0-NO PAIN

## 2019-05-03 NOTE — PROGRESS NOTES
Subjective      Patient ID: Sierra Kelley is a 34 y.o. female.    HPI  Patient presents clinic today for annual physical.  Patient recently went through extensive lab work and testing to donate a kidney.  There currently going through a process of when the procedure will actually occur.  Patient is very excited to be able to provide this for her son.  Patient had lab work prior to her appointment.  Patient's TSH level was then recommended range.  Patient is doing remarkably well in her overall health.  She is very nervous about going and flying on a plane.  She asked if she could have some Xanax to help with the flight.  Patient is current on all of her health maintenance recommendations per guidelines.          ROS  No headaches, vision changes, or hearing changes.  No URI symptoms.  No dysphagia or odynophagia.  No shortness of breath, coughing, wheezing or PND.  No chest pain, palpitations, or anginal symptoms.  No nausea/ vomiting, heartburn or abdominal discomfort.  No urinary or bowel changes.  Review of systems otherwise is negative.    Comprehensive Medical and Social History  Patient Active Problem List   Diagnosis   • Other specified hypothyroidism   • Mood disorder (CMS/HCC) ([facility])   • Class 1 obesity without serious comorbidity with body mass index (BMI) of 33.0 to 33.9 in adult     Past Medical History:   Diagnosis Date   • Anxiety    • Chronic headache    • Depression    • Hypothyroid      Past Surgical History:   Procedure Laterality Date   • THYROIDECTOMY  2003     No Known Allergies  Current Outpatient Medications   Medication Sig Dispense Refill   • ALPRAZolam (XANAX) 0.25 mg tablet Take 0.25 mg by mouth nightly as needed for anxiety Indications: uses rarely     • SYNTHROID 112 mcg tablet TAKE 1 TABLET BY MOUTH DAILY 30 tablet 11   • omega-3 fatty acids-fish oil (FISH OIL) 340-1,000 mg capsule Take 1 tab/cap by mouth daily.     • calcium carbonate-vitamin D3 600 mg(1,500mg) -400 unit per tablet  Take 1 tablet by mouth daily.     • magnesium oxide 250 mg tablet Take 250 mg by mouth daily.     • Lactobacillus acidophilus (ACIDOPHILUS ORAL) Take 1 tab/cap by mouth daily.       No current facility-administered medications for this visit.      Social History     Socioeconomic History   • Marital status:      Spouse name: Not on file   • Number of children: Not on file   • Years of education: 16   • Highest education level: Not on file   Social Needs   • Financial resource strain: Not on file   • Food insecurity - worry: Not on file   • Food insecurity - inability: Not on file   • Transportation needs - medical: Not on file   • Transportation needs - non-medical: Not on file   Occupational History   • Occupation:    Tobacco Use   • Smoking status: Never Smoker   • Smokeless tobacco: Never Used   Substance and Sexual Activity   • Alcohol use: Yes   • Drug use: No   • Sexual activity: Defer   Other Topics Concern   • Not on file   Social History Narrative   • Not on file       Physical Exam    /70   Pulse 65   Temp 36.6 °C (97.9 °F)   Wt 89.8 kg (198 lb)   SpO2 97%   BMI 32.95 kg/m²     GENERAL: Patient is alert and oriented, in no acute distress.  HEENT: Normocephalic, atraumatic. EOMI, PERRLA, sclerae anicteric.  Oropharynx within normal limits.  No tenderness to palpation of sinuses.    NECK: No cervical lymphadenopathy.  No carotid bruits, thyromegaly, or supraclavicular lymph nodes appreciated.  Trachea is midline.  LUNGS:  Clear to auscultation bilaterally, both anterior and posterior.  CARDIOVASCULAR EXAM: Regular rate and rhythm without murmur, rub, or gallop.    ABDOMEN:  Soft. Positive bowel sounds in all four quadrants.  No rebound. No guarding.  No hepatosplenomegaly or renal bruits appreciated.  Nontender, nondistended.  EXTREMITIES:  No clubbing, cyanosis, or edema.    Assessment/Plan     Diagnoses and all orders for this visit:    Adult onset hypothyroidism  -      levothyroxine (SYNTHROID) 125 mcg tablet; Take 1 tablet (125 mcg total) by mouth daily    Anxiety as acute reaction to exceptional stress  -     ALPRAZolam (XANAX) 0.25 mg tablet; Take 1 tablet (0.25 mg total) by mouth nightly as needed for anxiety Indications: uses rarely Max Daily Amount: 0.25 mg    1.  Annual physical.  Patient is current on all of her health maintenance recommendations per guidelines.  Patient had lab work prior to appointment and we did review all of those results.  2.  Hypothyroidism.  Patient will have a refill of her levothyroxine 125 mcg tablet.  Patient will need to have her TSH rechecked in 6 months.  3.  Anxiety as related to exceptional stress.  Patient is going to be flying and going through a major medical procedure.  Patient would benefit from Xanax as needed.  She was given very low dose and sparingly.  4.  All medical conditions are stable at this time.  Patient will be seen in 1 year or on as-needed basis.          YANELI JOE CNP  8:53 AM, 5/3/2019.        A voice recognition program was used to aid in documentation of this record. Sometimes words are not presented exactly as they were spoken.  While efforts were made to carefully edit and correct any inaccuracies, some errors may be present.  Please take this into context.  Please contact the provider if errors are identified.

## 2019-05-07 ENCOUNTER — APPOINTMENT (OUTPATIENT)
Dept: LAB | Facility: CLINIC | Age: 34
End: 2019-05-07
Payer: COMMERCIAL

## 2019-05-07 DIAGNOSIS — Z00.5 WILLING TO BE KIDNEY DONOR: Primary | ICD-10-CM

## 2019-05-07 PROCEDURE — 36415 COLL VENOUS BLD VENIPUNCTURE: CPT | Performed by: INTERNAL MEDICINE

## 2019-05-17 ENCOUNTER — TELEPHONE (OUTPATIENT)
Dept: TRANSPLANT | Facility: CLINIC | Age: 34
End: 2019-05-17

## 2019-05-21 ENCOUNTER — APPOINTMENT (OUTPATIENT)
Dept: TRANSPLANT | Facility: CLINIC | Age: 34
End: 2019-05-21
Attending: TRANSPLANT SURGERY

## 2019-05-21 ENCOUNTER — OFFICE VISIT (OUTPATIENT)
Dept: TRANSPLANT | Facility: CLINIC | Age: 34
End: 2019-05-21
Attending: TRANSPLANT SURGERY

## 2019-05-21 ENCOUNTER — ALLIED HEALTH/NURSE VISIT (OUTPATIENT)
Dept: TRANSPLANT | Facility: CLINIC | Age: 34
End: 2019-05-21
Attending: TRANSPLANT SURGERY

## 2019-05-21 ENCOUNTER — OFFICE VISIT (OUTPATIENT)
Dept: TRANSPLANT | Facility: CLINIC | Age: 34
End: 2019-05-21
Attending: NURSE PRACTITIONER

## 2019-05-21 ENCOUNTER — ANESTHESIA EVENT (OUTPATIENT)
Dept: SURGERY | Facility: CLINIC | Age: 34
DRG: 661 | End: 2019-05-21
Payer: COMMERCIAL

## 2019-05-21 VITALS
BODY MASS INDEX: 33.17 KG/M2 | HEIGHT: 65 IN | WEIGHT: 199.1 LBS | DIASTOLIC BLOOD PRESSURE: 81 MMHG | HEART RATE: 75 BPM | RESPIRATION RATE: 18 BRPM | SYSTOLIC BLOOD PRESSURE: 137 MMHG

## 2019-05-21 DIAGNOSIS — Z00.5 EXAMINATION OF POTENTIAL DONOR OF ORGAN AND TISSUE: Primary | ICD-10-CM

## 2019-05-21 DIAGNOSIS — Z00.5 EXAMINATION OF POTENTIAL DONOR OF ORGAN AND TISSUE: ICD-10-CM

## 2019-05-21 DIAGNOSIS — Z00.5 TRANSPLANT DONOR EVALUATION: ICD-10-CM

## 2019-05-21 LAB
ABO + RH BLD: NORMAL
ABO + RH BLD: NORMAL
ALBUMIN UR-MCNC: NEGATIVE MG/DL
APPEARANCE UR: CLEAR
APTT PPP: 27 SEC (ref 22–37)
B-HCG SERPL-ACNC: <1 IU/L (ref 0–5)
BILIRUB UR QL STRIP: NEGATIVE
BLD GP AB SCN SERPL QL: NORMAL
BLOOD BANK CMNT PATIENT-IMP: NORMAL
COLOR UR AUTO: YELLOW
CREAT SERPL-MCNC: 0.8 MG/DL (ref 0.52–1.04)
ERYTHROCYTE [DISTWIDTH] IN BLOOD BY AUTOMATED COUNT: 12.5 % (ref 10–15)
GFR SERPL CREATININE-BSD FRML MDRD: >90 ML/MIN/{1.73_M2}
GLUCOSE UR STRIP-MCNC: NEGATIVE MG/DL
HBA1C MFR BLD: 5.4 % (ref 0–5.6)
HCT VFR BLD AUTO: 39.7 % (ref 35–47)
HGB BLD-MCNC: 13.1 G/DL (ref 11.7–15.7)
HGB UR QL STRIP: NEGATIVE
INR PPP: 1.02 (ref 0.86–1.14)
KETONES UR STRIP-MCNC: NEGATIVE MG/DL
LEUKOCYTE ESTERASE UR QL STRIP: NEGATIVE
MCH RBC QN AUTO: 31.6 PG (ref 26.5–33)
MCHC RBC AUTO-ENTMCNC: 33 G/DL (ref 31.5–36.5)
MCV RBC AUTO: 96 FL (ref 78–100)
MUCOUS THREADS #/AREA URNS LPF: PRESENT /LPF
NITRATE UR QL: NEGATIVE
PH UR STRIP: 5 PH (ref 5–7)
PHOSPHATE SERPL-MCNC: 2.6 MG/DL (ref 2.5–4.5)
PLATELET # BLD AUTO: 241 10E9/L (ref 150–450)
RBC # BLD AUTO: 4.15 10E12/L (ref 3.8–5.2)
RBC #/AREA URNS AUTO: 0 /HPF (ref 0–2)
SOURCE: ABNORMAL
SP GR UR STRIP: 1.01 (ref 1–1.03)
SPECIMEN EXP DATE BLD: NORMAL
UROBILINOGEN UR STRIP-MCNC: 0 MG/DL (ref 0–2)
WBC # BLD AUTO: 6.2 10E9/L (ref 4–11)
WBC #/AREA URNS AUTO: 0 /HPF (ref 0–5)

## 2019-05-21 PROCEDURE — 85730 THROMBOPLASTIN TIME PARTIAL: CPT | Performed by: INTERNAL MEDICINE

## 2019-05-21 PROCEDURE — 36415 COLL VENOUS BLD VENIPUNCTURE: CPT | Performed by: INTERNAL MEDICINE

## 2019-05-21 PROCEDURE — 86665 EPSTEIN-BARR CAPSID VCA: CPT | Performed by: INTERNAL MEDICINE

## 2019-05-21 PROCEDURE — 84702 CHORIONIC GONADOTROPIN TEST: CPT | Performed by: INTERNAL MEDICINE

## 2019-05-21 PROCEDURE — G0463 HOSPITAL OUTPT CLINIC VISIT: HCPCS | Mod: ZF

## 2019-05-21 PROCEDURE — 85610 PROTHROMBIN TIME: CPT | Performed by: INTERNAL MEDICINE

## 2019-05-21 PROCEDURE — 83036 HEMOGLOBIN GLYCOSYLATED A1C: CPT | Performed by: INTERNAL MEDICINE

## 2019-05-21 PROCEDURE — 81001 URINALYSIS AUTO W/SCOPE: CPT | Performed by: TRANSPLANT SURGERY

## 2019-05-21 PROCEDURE — 82565 ASSAY OF CREATININE: CPT | Performed by: INTERNAL MEDICINE

## 2019-05-21 PROCEDURE — 86901 BLOOD TYPING SEROLOGIC RH(D): CPT | Performed by: TRANSPLANT SURGERY

## 2019-05-21 PROCEDURE — 86850 RBC ANTIBODY SCREEN: CPT | Performed by: TRANSPLANT SURGERY

## 2019-05-21 PROCEDURE — 86644 CMV ANTIBODY: CPT | Performed by: INTERNAL MEDICINE

## 2019-05-21 PROCEDURE — 86900 BLOOD TYPING SEROLOGIC ABO: CPT | Performed by: TRANSPLANT SURGERY

## 2019-05-21 PROCEDURE — 84100 ASSAY OF PHOSPHORUS: CPT | Performed by: INTERNAL MEDICINE

## 2019-05-21 PROCEDURE — 85027 COMPLETE CBC AUTOMATED: CPT | Performed by: INTERNAL MEDICINE

## 2019-05-21 ASSESSMENT — MIFFLIN-ST. JEOR: SCORE: 1608.99

## 2019-05-21 ASSESSMENT — PAIN SCALES - GENERAL: PAINLEVEL: NO PAIN (0)

## 2019-05-21 NOTE — LETTER
Date:May 29, 2019      Patient was self referred, no letter generated. Do not send.        [facility] Health Information

## 2019-05-21 NOTE — NURSING NOTE
"Chief Complaint   Patient presents with     Pre-Op Exam     Day -1     Blood pressure 137/81, pulse 75, resp. rate 18, height 1.651 m (5' 5\"), weight 90.3 kg (199 lb 1.6 oz).    Patient was taught on pre-op diet and showering  Marita Johnson CMA on 5/21/2019 at 11:15 AM    "

## 2019-05-21 NOTE — NURSING NOTE
Saw donor in clinic for her Pre-Op visit.  I reviewed procedure for the surgery day.  She will be donating to a recipient at [place] in a NKR swap [id number].      Her  was with her today in clinic.   I reviewed pain control medication that is typically prescribed including nerve block which Anesthesia will place preop.  I reviewed the ERAS protocol teaching sheet:  Encourage Activity.  Demonstrated and reviewed importance of Incentive Spirometry usage- I gave the patient an IS to bring home to practice and to bring to surgery.   I gave the patient two bottles of Ensure clear with instructions to drink at bedtime the night prior to surgery and again the morning of surgery when the patient awakens.  I reviewed instruction for no solid foods after 10 pm the night before surgery.  I reviewed NPO status including clear liquids begins at 04:30 on the morning of surgery.  I reviewed arrival time of 05:00 to 3C and Start time of surgery 06:30.    Pt will meet with Surgeon and Nurse Practitioner today to develop plan for surgery.  I gave the patient a donor blanket and mug.   I thanked the patient for all that she is doing to make this happen.  I reviewed that the donor's bills will be paid for by insurance of the matched recipient.    I reviewed that the patient has a right to withdraw from kidney donation at any time, for any reason.   I reviewed post op plan of care including timing for office visit with surgeon and when labs are needed.  The patient will go home on June 5 staying in the [facility] until then.   We reviewed plan for blood draws post donation.   I reviewed both the donor and recipient ABO blood types, UNOS ID and placed source documents in folder for the day of surgery ABO verification by the surgical team.  I reviewed documents with Dr Elizalde.    Final XM results completed by the other center.  Serologies are acceptable, they were performed by [facility] lab, copy sent to be entered by [facility].  I reviewed  the SRTR datasheet for the other center kidney recipient data.  She acknowledged understanding of this document.    I reviewed the KPD program s remedy for failed KPD exchanges and that the remedy does not include any additional priority for the paired candidate on the  donor waiting list.  I reviewed how the kidneys are transported to the matched recipient's in the exchanges.  I explained that the process is monitored closely, in fact a GPS device will be used but the process does have risk.  I reviewed that the shipped kidney has a risk of getting lost or damaged while being shipped.    I reviewed that the outcome of her matched recipient may be different from her intended recipient's outcome.      I reviewed the KPD program's rules for communication between her and her matched recipient.  I stated she can mail a card to me and I will see that it gets to her recipient, I ask that she keeps her identifiers off of the mailing.    Final logistics call is completed.     [employer] packaging forms have been sent for the start time of 630.   is VERO, Job [id number] .    Order placed for blood to be drawn on admission to hospital for  5 yellow ACD Tubes to be packaged with the organ.    GPS device and copy of donor chart will be packaged with the organ.  I have faxed recipient center copy of donor s h/p and labs.      I have been given clearance to proceed from Financial and Transplant Managers.  The other centers in this exchange have both met CMS guidelines for performing kidney transplant as shown on the CMS website.

## 2019-05-21 NOTE — LETTER
5/21/2019       RE: Elle Hill  [address] [facility] [address]     Dear Colleague,    Thank you for referring your patient, Elle Hill, to the [facility] SOLID ORGAN TRANSPLANT at [facility]. Please see a copy of my visit note below.    Transplant Surgery H&P                                                        HPI:                                                      Ms. Hill is a 34 year old female who comes to clinic today for preop prior to planned laparoscopic kidney donation surgery. The patient was previously reviewed by the living donor multidisciplinary selection committee and found to be medically and psychosocially appropriate for voluntary kidney donation. The patient denies any feelings of being pressured to proceed with kidney donation.  Health events since donor evaluation: None    Special considerations:   Constipation: yes  PONV: no  History of Urinary retention? no  Significant neck/back/joint concerns for lateral decubitus positioning?: No  Episcopalian: No    MEDICAL HISTORY:                                                      Patient Active Problem List    Diagnosis Date Noted     Hypothyroidism      Priority: Medium     Anxiety      Priority: Medium     Transplant donor evaluation 02/22/2018     Priority: Medium      Past Medical History:   Diagnosis Date     Anxiety      Hypothyroidism      Mononucleosis      Thyroid nodule, cold     s/p partial thyroidectomy     UTI (urinary tract infection)      Past Surgical History:   Procedure Laterality Date     THYROIDECTOMY      partial     Current Outpatient Medications   Medication Sig Dispense Refill     ALPRAZolam (XANAX) 0.5 MG tablet TAKE 1 TABLET BY MOUTH UP THREE TIMES DAILY IN 24 HOURS AS NEEDED FOR ANXIETY       levothyroxine (SYNTHROID) 112 MCG tablet Take 125 mcg by mouth daily        melatonin 5 MG tablet Take 5 mg by mouth nightly as needed for sleep       OTC products: None, except  as noted above  No Known Allergies   Social History     Tobacco Use     Smoking status: Never Smoker     Smokeless tobacco: Never Used   Substance Use Topics     Alcohol use: Yes     Alcohol/week: 1.8 oz     Types: 3 Standard drinks or equivalent per week     Comment: 0 - 2 drinks per week     History   Drug Use No       REVIEW OF SYSTEMS:                                                    CONSTITUTIONAL: NEGATIVE for fever, chills, change in weight  INTEGUMENTARY/SKIN: NEGATIVE for worrisome rashes, moles or lesions  EYES: NEGATIVE for vision changes or irritation  ENT/MOUTH: NEGATIVE for ear, mouth and throat problems  RESP: NEGATIVE for significant cough or SOB  BREAST: NEGATIVE for masses, tenderness or discharge  CV: NEGATIVE for chest pain, palpitations or peripheral edema  GI: NEGATIVE for nausea, abdominal pain, heartburn, or change in bowel habits  : NEGATIVE for frequency, dysuria, or hematuria  MUSCULOSKELETAL: NEGATIVE for significant arthralgias or myalgia  NEURO: NEGATIVE for weakness, dizziness or paresthesias  ENDOCRINE: NEGATIVE for temperature intolerance, skin/hair changes  HEME: NEGATIVE for bleeding problems  PSYCHIATRIC: NEGATIVE for changes in mood or affect    EXAM:                                                    There were no vitals taken for this visit.    GENERAL APPEARANCE: healthy, alert and no distress     EYES: EOMI, PERRL     HENT: ear canals and TM's normal and nose and mouth without ulcers or lesions     NECK: no adenopathy, no asymmetry, masses, or scars and thyroid normal to palpation     RESP: lungs clear to auscultation - no rales, rhonchi or wheezes     CV: regular rates and rhythm, normal S1 S2, no S3 or S4 and no murmur, click or rub     ABDOMEN:  soft, nontender, no HSM or masses and bowel sounds normal     MS: extremities normal- no gross deformities noted, no evidence of inflammation in joints, FROM in all extremities.     SKIN: no suspicious lesions or rashes      NEURO: Normal strength and tone, sensory exam grossly normal, mentation intact and speech normal     PSYCH: mentation appears normal. and affect normal/bright     LYMPHATICS: No cervical adenopathy    DIAGNOSTICS:                                                      EKG: sinus bradycardia, normal axis, normal intervals, no acute ST/T changes c/w ischemia, no LVH by voltage criteria, unchanged from previous tracings  Chest XRay  Labs Resulted Today:   Results for orders placed or performed in visit on 05/21/19   HCG quantitative pregnancy   Result Value Ref Range    HCG Quantitative Serum <1 0 - 5 IU/L   Creatinine   Result Value Ref Range    Creatinine 0.80 0.52 - 1.04 mg/dL    GFR Estimate >90 >60 mL/min/[1.73_m2]    GFR Estimate If Black >90 >60 mL/min/[1.73_m2]   Phosphorus   Result Value Ref Range    Phosphorus 2.6 2.5 - 4.5 mg/dL   Hemoglobin A1c   Result Value Ref Range    Hemoglobin A1C 5.4 0 - 5.6 %   INR   Result Value Ref Range    INR 1.02 0.86 - 1.14   Partial thromboplastin time   Result Value Ref Range    PTT 27 22 - 37 sec   CBC with platelets   Result Value Ref Range    WBC 6.2 4.0 - 11.0 10e9/L    RBC Count 4.15 3.8 - 5.2 10e12/L    Hemoglobin 13.1 11.7 - 15.7 g/dL    Hematocrit 39.7 35.0 - 47.0 %    MCV 96 78 - 100 fl    MCH 31.6 26.5 - 33.0 pg    MCHC 33.0 31.5 - 36.5 g/dL    RDW 12.5 10.0 - 15.0 %    Platelet Count 241 150 - 450 10e9/L   ABO/Rh type and screen   Result Value Ref Range    ABO PENDING     Antibody Screen PENDING     Test Valid Only At          [facility]    Specimen Expires 05/24/2019      Labs Drawn and in Process:   Unresulted Labs Ordered in the Past 30 Days of this Admission     Date and Time Order Name Status Description    5/21/2019 1010 CMV ANTIBODY IGG In process     5/21/2019 1010 EBV CAPSID ANTIBODY IGG In process     5/21/2019 1010 ABO/RH TYPE AND SCREEN In process         Recent Labs   Lab Test 05/21/19  1025 03/09/18  0655    HGB 13.1 12.7    247   INR 1.02 1.01   NA  --  137   POTASSIUM  --  4.0   CR 0.80 0.78   A1C 5.4 5.3      Assessment:                                                    Healthy voluntary kidney donor    There have been no significant intercurrent medical problems or change of intent in proceeding with kidney donation as scheduled on 5/22/2019.    1. Labs reviewed and within normal limits: No  2. EKG (3/9/2018): sinus bradycardia  3. Paired Exchange case: Yes  4. ABO= PENDING  5. Laterality: left  6. Outstanding issues: UA, final cross and ABO    Plan:                                                      1. Consent: Done  2. Outstanding issues: UA, final cross match and ABO      Signed Electronically by: Virgen Seaman    Ms. Hill was seen and evaluated today as part of a shared APRN/PA visit.     I personally reviewed past medical and surgical history, vital signs, medications and labs, present and past medical history,and significant physical exam findings with the advanced practice provider.    My key findings include:  Normal abdoment.    Key management decisions made by me and carried out under my direction include:  Proceed with donation of left kidney tomorrow.    Alfa Elizalde M.D.      Again, thank you for allowing me to participate in the care of your patient.      Sincerely,    Alfa Elizalde MD

## 2019-05-21 NOTE — LETTER
5/21/2019     RE: Elle Hill  [address] [facility] [address]     Dear Colleague,    Thank you for referring your patient, Elle Hill, to the [facility] SOLID ORGAN TRANSPLANT at [facility]. Please see a copy of my visit note below.    See surgeon note     Again, thank you for allowing me to participate in the care of your patient.      Sincerely,    Virgen Seaman NP

## 2019-05-21 NOTE — ANESTHESIA PREPROCEDURE EVALUATION
Anesthesia Pre-Procedure Evaluation    Patient: Elle Hill   MRN:     [id number] Gender:   female   Age:    34 year old :      1985        Preoperative Diagnosis: Kidney Donor   Procedure(s):  Laparoscopic Hand Assisted  Unrelated Nondirected Kidney Donor     Past Medical History:   Diagnosis Date     Anxiety      Hypothyroidism      Mononucleosis      Thyroid nodule, cold     s/p partial thyroidectomy     UTI (urinary tract infection)       Past Surgical History:   Procedure Laterality Date     THYROIDECTOMY      partial          Anesthesia Evaluation     .             ROS/MED HX    ENT/Pulmonary:  - neg pulmonary ROS     Neurologic:  - neg neurologic ROS     Cardiovascular:  - neg cardiovascular ROS       METS/Exercise Tolerance:     Hematologic:  - neg hematologic  ROS       Musculoskeletal:  - neg musculoskeletal ROS       GI/Hepatic:  - neg GI/hepatic ROS       Renal/Genitourinary:  - ROS Renal section negative       Endo:     (+) thyroid problem hypothyroidism, .      Psychiatric:         Infectious Disease:         Malignancy:         Other:                         PHYSICAL EXAM:   Mental Status/Neuro:    Airway:   Mallampati: II  Mouth/Opening: Full  TM distance: > 6 cm  Neck ROM: Full   Respiratory: Auscultation: CTAB     Resp. Rate: Normal     Resp. Effort: Normal      CV: Rhythm: Regular  Heart: Normal Sounds   Comments:      Dental: Normal                  Lab Results   Component Value Date    WBC 6.2 2019    HGB 13.1 2019    HCT 39.7 2019     2019     2018    POTASSIUM 4.0 2018    CHLORIDE 103 2018    CO2 27 2018    BUN 15 2018    CR 0.80 2019    GLC 82 2018    RENEE 8.6 2018    PHOS 2.6 2019    ALBUMIN 3.8 2018    PROTTOTAL 7.1 2018    ALT 34 2018    AST 29 2018    ALKPHOS 45 2018    BILITOTAL 0.6 2018    PTT 27 2019    INR 1.02 2019       Preop Vitals  BP  "Readings from Last 3 Encounters:   05/21/19 137/81   03/09/18 110/71   03/09/18 107/72    Pulse Readings from Last 3 Encounters:   05/21/19 75   03/09/18 77      Resp Readings from Last 3 Encounters:   05/21/19 18   03/09/18 20    SpO2 Readings from Last 3 Encounters:   03/09/18 100%      Temp Readings from Last 1 Encounters:   03/09/18 36.6  C (97.9  F) (Oral)    Ht Readings from Last 1 Encounters:   05/21/19 1.651 m (5' 5\")      Wt Readings from Last 1 Encounters:   05/21/19 90.3 kg (199 lb 1.6 oz)    Estimated body mass index is 33.13 kg/m  as calculated from the following:    Height as of 5/21/19: 1.651 m (5' 5\").    Weight as of 5/21/19: 90.3 kg (199 lb 1.6 oz).     LDA:            Assessment:   ASA SCORE: 2       Documentation: H&P complete; Preop Testing complete; Consents complete   Proceeding: Proceed without further delay  Tobacco Use:  NO Active use of Tobacco/UNKNOWN Tobacco use status     Plan:   Anes. Type:  General; Regional     RA Details:  Pre Induction; SS; Exparel     RA-Location/Type: TAP; Plane Block   Pre-Induction: Midazolam IV; Opioid IV; Acetaminophen PO   Induction:  IV (Standard)   Airway: Oral ETT   Access/Monitoring: PIV; 2nd PIV   Maintenance: Balanced; Ketamine   Emergence: Procedure Site   Logistics: Same Day Surgery     Postop Pain/Sedation Strategy:  Standard-Options: Opioids PRN; Block SS; Exparel     PONV Management:  Adult Risk Factors: Female, Non-Smoker, Postop Opioids  Prevention: Ondansetron; Dexamethasone     CONSENT: Direct conversation   Plan and risks discussed with: Patient   Blood Products: Consent Deferred (Minimal Blood Loss)                         Laz Schrader MD  "

## 2019-05-21 NOTE — PROGRESS NOTES
Transplant Surgery H&P                                                        HPI:                                                      Ms. Hill is a 34 year old female who comes to clinic today for preop prior to planned laparoscopic kidney donation surgery. The patient was previously reviewed by the living donor multidisciplinary selection committee and found to be medically and psychosocially appropriate for voluntary kidney donation. The patient denies any feelings of being pressured to proceed with kidney donation.  Health events since donor evaluation: None    Special considerations:   Constipation: yes  PONV: no  History of Urinary retention? no  Significant neck/back/joint concerns for lateral decubitus positioning?: No  Faith: No    MEDICAL HISTORY:                                                      Patient Active Problem List    Diagnosis Date Noted     Hypothyroidism      Priority: Medium     Anxiety      Priority: Medium     Transplant donor evaluation 02/22/2018     Priority: Medium      Past Medical History:   Diagnosis Date     Anxiety      Hypothyroidism      Mononucleosis      Thyroid nodule, cold     s/p partial thyroidectomy     UTI (urinary tract infection)      Past Surgical History:   Procedure Laterality Date     THYROIDECTOMY      partial     Current Outpatient Medications   Medication Sig Dispense Refill     ALPRAZolam (XANAX) 0.5 MG tablet TAKE 1 TABLET BY MOUTH UP THREE TIMES DAILY IN 24 HOURS AS NEEDED FOR ANXIETY       levothyroxine (SYNTHROID) 112 MCG tablet Take 125 mcg by mouth daily        melatonin 5 MG tablet Take 5 mg by mouth nightly as needed for sleep       OTC products: None, except as noted above  No Known Allergies   Social History     Tobacco Use     Smoking status: Never Smoker     Smokeless tobacco: Never Used   Substance Use Topics     Alcohol use: Yes     Alcohol/week: 1.8 oz     Types: 3 Standard drinks or equivalent per week     Comment: 0 - 2 drinks per  week     History   Drug Use No       REVIEW OF SYSTEMS:                                                    CONSTITUTIONAL: NEGATIVE for fever, chills, change in weight  INTEGUMENTARY/SKIN: NEGATIVE for worrisome rashes, moles or lesions  EYES: NEGATIVE for vision changes or irritation  ENT/MOUTH: NEGATIVE for ear, mouth and throat problems  RESP: NEGATIVE for significant cough or SOB  BREAST: NEGATIVE for masses, tenderness or discharge  CV: NEGATIVE for chest pain, palpitations or peripheral edema  GI: NEGATIVE for nausea, abdominal pain, heartburn, or change in bowel habits  : NEGATIVE for frequency, dysuria, or hematuria  MUSCULOSKELETAL: NEGATIVE for significant arthralgias or myalgia  NEURO: NEGATIVE for weakness, dizziness or paresthesias  ENDOCRINE: NEGATIVE for temperature intolerance, skin/hair changes  HEME: NEGATIVE for bleeding problems  PSYCHIATRIC: NEGATIVE for changes in mood or affect    EXAM:                                                    There were no vitals taken for this visit.    GENERAL APPEARANCE: healthy, alert and no distress     EYES: EOMI, PERRL     HENT: ear canals and TM's normal and nose and mouth without ulcers or lesions     NECK: no adenopathy, no asymmetry, masses, or scars and thyroid normal to palpation     RESP: lungs clear to auscultation - no rales, rhonchi or wheezes     CV: regular rates and rhythm, normal S1 S2, no S3 or S4 and no murmur, click or rub     ABDOMEN:  soft, nontender, no HSM or masses and bowel sounds normal     MS: extremities normal- no gross deformities noted, no evidence of inflammation in joints, FROM in all extremities.     SKIN: no suspicious lesions or rashes     NEURO: Normal strength and tone, sensory exam grossly normal, mentation intact and speech normal     PSYCH: mentation appears normal. and affect normal/bright     LYMPHATICS: No cervical adenopathy    DIAGNOSTICS:                                                      EKG: sinus  bradycardia, normal axis, normal intervals, no acute ST/T changes c/w ischemia, no LVH by voltage criteria, unchanged from previous tracings  Chest XRay  Labs Resulted Today:   Results for orders placed or performed in visit on 05/21/19   HCG quantitative pregnancy   Result Value Ref Range    HCG Quantitative Serum <1 0 - 5 IU/L   Creatinine   Result Value Ref Range    Creatinine 0.80 0.52 - 1.04 mg/dL    GFR Estimate >90 >60 mL/min/[1.73_m2]    GFR Estimate If Black >90 >60 mL/min/[1.73_m2]   Phosphorus   Result Value Ref Range    Phosphorus 2.6 2.5 - 4.5 mg/dL   Hemoglobin A1c   Result Value Ref Range    Hemoglobin A1C 5.4 0 - 5.6 %   INR   Result Value Ref Range    INR 1.02 0.86 - 1.14   Partial thromboplastin time   Result Value Ref Range    PTT 27 22 - 37 sec   CBC with platelets   Result Value Ref Range    WBC 6.2 4.0 - 11.0 10e9/L    RBC Count 4.15 3.8 - 5.2 10e12/L    Hemoglobin 13.1 11.7 - 15.7 g/dL    Hematocrit 39.7 35.0 - 47.0 %    MCV 96 78 - 100 fl    MCH 31.6 26.5 - 33.0 pg    MCHC 33.0 31.5 - 36.5 g/dL    RDW 12.5 10.0 - 15.0 %    Platelet Count 241 150 - 450 10e9/L   ABO/Rh type and screen   Result Value Ref Range    ABO PENDING     Antibody Screen PENDING     Test Valid Only At          [facility]    Specimen Expires 05/24/2019      Labs Drawn and in Process:   Unresulted Labs Ordered in the Past 30 Days of this Admission     Date and Time Order Name Status Description    5/21/2019 1010 CMV ANTIBODY IGG In process     5/21/2019 1010 EBV CAPSID ANTIBODY IGG In process     5/21/2019 1010 ABO/RH TYPE AND SCREEN In process         Recent Labs   Lab Test 05/21/19  1025 03/09/18  0655   HGB 13.1 12.7    247   INR 1.02 1.01   NA  --  137   POTASSIUM  --  4.0   CR 0.80 0.78   A1C 5.4 5.3      Assessment:                                                    Healthy voluntary kidney donor    There have been no significant intercurrent medical problems or  change of intent in proceeding with kidney donation as scheduled on 5/22/2019.    1. Labs reviewed and within normal limits: No  2. EKG (3/9/2018): sinus bradycardia  3. Paired Exchange case: Yes  4. ABO= PENDING  5. Laterality: left  6. Outstanding issues: UA, final cross and ABO    Plan:                                                      1. Consent: Done  2. Outstanding issues: UA, final cross match and ABO      Signed Electronically by: Virgen Seaman    Ms. Hill was seen and evaluated today as part of a shared APRN/PA visit.     I personally reviewed past medical and surgical history, vital signs, medications and labs, present and past medical history,and significant physical exam findings with the advanced practice provider.    My key findings include:  Normal abdoment.    Key management decisions made by me and carried out under my direction include:  Proceed with donation of left kidney tomorrow.    Alfa Elizalde M.D.

## 2019-05-22 ENCOUNTER — ANESTHESIA (OUTPATIENT)
Dept: SURGERY | Facility: CLINIC | Age: 34
DRG: 661 | End: 2019-05-22
Payer: COMMERCIAL

## 2019-05-22 ENCOUNTER — HOSPITAL ENCOUNTER (INPATIENT)
Facility: CLINIC | Age: 34
LOS: 2 days | Discharge: HOME | DRG: 661 | End: 2019-05-24
Attending: TRANSPLANT SURGERY | Admitting: TRANSPLANT SURGERY
Payer: COMMERCIAL

## 2019-05-22 DIAGNOSIS — Z52.4 ENCOUNTER FOR DONATION OF KIDNEY: ICD-10-CM

## 2019-05-22 DIAGNOSIS — Z52.4 DONOR OF KIDNEY FOR TRANSPLANT: Primary | ICD-10-CM

## 2019-05-22 LAB
CMV IGG SERPL QL IA: 4.4 AI (ref 0–0.8)
EBV VCA IGG SER QL IA: >8 AI (ref 0–0.8)
GLUCOSE BLDC GLUCOMTR-MCNC: 85 MG/DL (ref 70–99)
HCG UR QL: NEGATIVE
HCT VFR BLD AUTO: 37 % (ref 35–47)
HGB BLD-MCNC: 11.9 G/DL (ref 11.7–15.7)

## 2019-05-22 PROCEDURE — 25000132 ZZH RX MED GY IP 250 OP 250 PS 637: Performed by: ANESTHESIOLOGY

## 2019-05-22 PROCEDURE — 25000566 ZZH SEVOFLURANE, EA 15 MIN: Performed by: TRANSPLANT SURGERY

## 2019-05-22 PROCEDURE — 25000125 ZZHC RX 250: Performed by: ANESTHESIOLOGY

## 2019-05-22 PROCEDURE — 25000128 H RX IP 250 OP 636: Performed by: STUDENT IN AN ORGANIZED HEALTH CARE EDUCATION/TRAINING PROGRAM

## 2019-05-22 PROCEDURE — 36000064 ZZH SURGERY LEVEL 4 EA 15 ADDTL MIN - UMMC: Performed by: TRANSPLANT SURGERY

## 2019-05-22 PROCEDURE — 25000132 ZZH RX MED GY IP 250 OP 250 PS 637: Performed by: STUDENT IN AN ORGANIZED HEALTH CARE EDUCATION/TRAINING PROGRAM

## 2019-05-22 PROCEDURE — 25000132 ZZH RX MED GY IP 250 OP 250 PS 637: Performed by: NURSE PRACTITIONER

## 2019-05-22 PROCEDURE — 25800030 ZZH RX IP 258 OP 636: Performed by: NURSE ANESTHETIST, CERTIFIED REGISTERED

## 2019-05-22 PROCEDURE — 12000026 ZZH R&B TRANSPLANT

## 2019-05-22 PROCEDURE — 25000128 H RX IP 250 OP 636: Performed by: TRANSPLANT SURGERY

## 2019-05-22 PROCEDURE — 81025 URINE PREGNANCY TEST: CPT | Performed by: ANESTHESIOLOGY

## 2019-05-22 PROCEDURE — 85014 HEMATOCRIT: CPT | Performed by: STUDENT IN AN ORGANIZED HEALTH CARE EDUCATION/TRAINING PROGRAM

## 2019-05-22 PROCEDURE — 85018 HEMOGLOBIN: CPT | Performed by: STUDENT IN AN ORGANIZED HEALTH CARE EDUCATION/TRAINING PROGRAM

## 2019-05-22 PROCEDURE — C9290 INJ, BUPIVACAINE LIPOSOME: HCPCS | Performed by: STUDENT IN AN ORGANIZED HEALTH CARE EDUCATION/TRAINING PROGRAM

## 2019-05-22 PROCEDURE — 37000009 ZZH ANESTHESIA TECHNICAL FEE, EACH ADDTL 15 MIN: Performed by: TRANSPLANT SURGERY

## 2019-05-22 PROCEDURE — 36415 COLL VENOUS BLD VENIPUNCTURE: CPT | Performed by: TRANSPLANT SURGERY

## 2019-05-22 PROCEDURE — 36415 COLL VENOUS BLD VENIPUNCTURE: CPT | Performed by: STUDENT IN AN ORGANIZED HEALTH CARE EDUCATION/TRAINING PROGRAM

## 2019-05-22 PROCEDURE — 71000014 ZZH RECOVERY PHASE 1 LEVEL 2 FIRST HR: Performed by: TRANSPLANT SURGERY

## 2019-05-22 PROCEDURE — 36000062 ZZH SURGERY LEVEL 4 1ST 30 MIN - UMMC: Performed by: TRANSPLANT SURGERY

## 2019-05-22 PROCEDURE — 00000146 ZZHCL STATISTIC GLUCOSE BY METER IP

## 2019-05-22 PROCEDURE — 40000171 ZZH STATISTIC PRE-PROCEDURE ASSESSMENT III: Performed by: TRANSPLANT SURGERY

## 2019-05-22 PROCEDURE — 27210794 ZZH OR GENERAL SUPPLY STERILE: Performed by: TRANSPLANT SURGERY

## 2019-05-22 PROCEDURE — 25000128 H RX IP 250 OP 636: Performed by: NURSE PRACTITIONER

## 2019-05-22 PROCEDURE — 25000128 H RX IP 250 OP 636: Performed by: NURSE ANESTHETIST, CERTIFIED REGISTERED

## 2019-05-22 PROCEDURE — 0TT10ZZ RESECTION OF LEFT KIDNEY, OPEN APPROACH: ICD-10-PCS | Performed by: TRANSPLANT SURGERY

## 2019-05-22 PROCEDURE — 71000015 ZZH RECOVERY PHASE 1 LEVEL 2 EA ADDTL HR: Performed by: TRANSPLANT SURGERY

## 2019-05-22 PROCEDURE — 25000565 ZZH ISOFLURANE, EA 15 MIN: Performed by: TRANSPLANT SURGERY

## 2019-05-22 PROCEDURE — 25000125 ZZHC RX 250: Performed by: NURSE ANESTHETIST, CERTIFIED REGISTERED

## 2019-05-22 PROCEDURE — 37000008 ZZH ANESTHESIA TECHNICAL FEE, 1ST 30 MIN: Performed by: TRANSPLANT SURGERY

## 2019-05-22 PROCEDURE — 25800030 ZZH RX IP 258 OP 636: Performed by: ANESTHESIOLOGY

## 2019-05-22 PROCEDURE — 25000128 H RX IP 250 OP 636: Performed by: ANESTHESIOLOGY

## 2019-05-22 RX ORDER — ACETAMINOPHEN 325 MG/1
975 TABLET ORAL ONCE
Status: COMPLETED | OUTPATIENT
Start: 2019-05-22 | End: 2019-05-22

## 2019-05-22 RX ORDER — METOCLOPRAMIDE HYDROCHLORIDE 5 MG/ML
10 INJECTION INTRAMUSCULAR; INTRAVENOUS EVERY 6 HOURS PRN
Status: DISCONTINUED | OUTPATIENT
Start: 2019-05-22 | End: 2019-05-24 | Stop reason: HOSPADM

## 2019-05-22 RX ORDER — LEVOTHYROXINE SODIUM 125 UG/1
125 TABLET ORAL DAILY
COMMUNITY

## 2019-05-22 RX ORDER — AMOXICILLIN 250 MG
2 CAPSULE ORAL 2 TIMES DAILY
Status: DISCONTINUED | OUTPATIENT
Start: 2019-05-22 | End: 2019-05-24 | Stop reason: HOSPADM

## 2019-05-22 RX ORDER — ONDANSETRON 2 MG/ML
4 INJECTION INTRAMUSCULAR; INTRAVENOUS EVERY 6 HOURS PRN
Status: DISCONTINUED | OUTPATIENT
Start: 2019-05-22 | End: 2019-05-24 | Stop reason: HOSPADM

## 2019-05-22 RX ORDER — DEXAMETHASONE SODIUM PHOSPHATE 4 MG/ML
8 INJECTION, SOLUTION INTRA-ARTICULAR; INTRALESIONAL; INTRAMUSCULAR; INTRAVENOUS; SOFT TISSUE ONCE
Status: DISCONTINUED | OUTPATIENT
Start: 2019-05-22 | End: 2019-05-22 | Stop reason: HOSPADM

## 2019-05-22 RX ORDER — HEPARIN SODIUM 1000 [USP'U]/ML
INJECTION, SOLUTION INTRAVENOUS; SUBCUTANEOUS PRN
Status: DISCONTINUED | OUTPATIENT
Start: 2019-05-22 | End: 2019-05-22

## 2019-05-22 RX ORDER — HEPARIN SODIUM 1000 [USP'U]/ML
5000 INJECTION, SOLUTION INTRAVENOUS; SUBCUTANEOUS ONCE
Status: DISCONTINUED | OUTPATIENT
Start: 2019-05-22 | End: 2019-05-22 | Stop reason: HOSPADM

## 2019-05-22 RX ORDER — KETOROLAC TROMETHAMINE 30 MG/ML
15 INJECTION, SOLUTION INTRAMUSCULAR; INTRAVENOUS EVERY 8 HOURS
Status: DISCONTINUED | OUTPATIENT
Start: 2019-05-22 | End: 2019-05-22

## 2019-05-22 RX ORDER — SODIUM CHLORIDE, SODIUM LACTATE, POTASSIUM CHLORIDE, CALCIUM CHLORIDE 600; 310; 30; 20 MG/100ML; MG/100ML; MG/100ML; MG/100ML
INJECTION, SOLUTION INTRAVENOUS CONTINUOUS PRN
Status: DISCONTINUED | OUTPATIENT
Start: 2019-05-22 | End: 2019-05-22

## 2019-05-22 RX ORDER — GABAPENTIN 300 MG/1
300 CAPSULE ORAL ONCE
Status: COMPLETED | OUTPATIENT
Start: 2019-05-22 | End: 2019-05-22

## 2019-05-22 RX ORDER — ONDANSETRON 4 MG/1
4 TABLET, ORALLY DISINTEGRATING ORAL EVERY 6 HOURS PRN
Status: DISCONTINUED | OUTPATIENT
Start: 2019-05-22 | End: 2019-05-24 | Stop reason: HOSPADM

## 2019-05-22 RX ORDER — KETOROLAC TROMETHAMINE 30 MG/ML
15 INJECTION, SOLUTION INTRAMUSCULAR; INTRAVENOUS ONCE
Status: DISCONTINUED | OUTPATIENT
Start: 2019-05-22 | End: 2019-05-22 | Stop reason: HOSPADM

## 2019-05-22 RX ORDER — KETOROLAC TROMETHAMINE 30 MG/ML
INJECTION, SOLUTION INTRAMUSCULAR; INTRAVENOUS PRN
Status: DISCONTINUED | OUTPATIENT
Start: 2019-05-22 | End: 2019-05-22

## 2019-05-22 RX ORDER — GLYCOPYRROLATE 0.2 MG/ML
INJECTION, SOLUTION INTRAMUSCULAR; INTRAVENOUS PRN
Status: DISCONTINUED | OUTPATIENT
Start: 2019-05-22 | End: 2019-05-22

## 2019-05-22 RX ORDER — OXYCODONE HYDROCHLORIDE 5 MG/1
5-10 TABLET ORAL
Status: DISCONTINUED | OUTPATIENT
Start: 2019-05-22 | End: 2019-05-23

## 2019-05-22 RX ORDER — GABAPENTIN 300 MG/1
600 CAPSULE ORAL ONCE
Status: COMPLETED | OUTPATIENT
Start: 2019-05-22 | End: 2019-05-22

## 2019-05-22 RX ORDER — PROTAMINE SULFATE 10 MG/ML
50 INJECTION, SOLUTION INTRAVENOUS ONCE
Status: COMPLETED | OUTPATIENT
Start: 2019-05-22 | End: 2019-05-22

## 2019-05-22 RX ORDER — ALBUMIN, HUMAN INJ 5% 5 %
250 SOLUTION INTRAVENOUS EVERY 10 MIN PRN
Status: DISCONTINUED | OUTPATIENT
Start: 2019-05-22 | End: 2019-05-22 | Stop reason: HOSPADM

## 2019-05-22 RX ORDER — DEXAMETHASONE SODIUM PHOSPHATE 10 MG/ML
INJECTION, SOLUTION INTRAMUSCULAR; INTRAVENOUS PRN
Status: DISCONTINUED | OUTPATIENT
Start: 2019-05-22 | End: 2019-05-22

## 2019-05-22 RX ORDER — LIDOCAINE 40 MG/G
CREAM TOPICAL
Status: DISCONTINUED | OUTPATIENT
Start: 2019-05-22 | End: 2019-05-22 | Stop reason: HOSPADM

## 2019-05-22 RX ORDER — METOCLOPRAMIDE 5 MG/1
10 TABLET ORAL EVERY 6 HOURS PRN
Status: DISCONTINUED | OUTPATIENT
Start: 2019-05-22 | End: 2019-05-24 | Stop reason: HOSPADM

## 2019-05-22 RX ORDER — HYDROMORPHONE HYDROCHLORIDE 1 MG/ML
.3-.5 INJECTION, SOLUTION INTRAMUSCULAR; INTRAVENOUS; SUBCUTANEOUS
Status: DISCONTINUED | OUTPATIENT
Start: 2019-05-22 | End: 2019-05-23

## 2019-05-22 RX ORDER — HYDROMORPHONE HYDROCHLORIDE 1 MG/ML
.3-.5 INJECTION, SOLUTION INTRAMUSCULAR; INTRAVENOUS; SUBCUTANEOUS EVERY 5 MIN PRN
Status: DISCONTINUED | OUTPATIENT
Start: 2019-05-22 | End: 2019-05-22 | Stop reason: HOSPADM

## 2019-05-22 RX ORDER — KETAMINE HYDROCHLORIDE 10 MG/ML
INJECTION, SOLUTION INTRAMUSCULAR; INTRAVENOUS PRN
Status: DISCONTINUED | OUTPATIENT
Start: 2019-05-22 | End: 2019-05-22

## 2019-05-22 RX ORDER — LIDOCAINE HYDROCHLORIDE 20 MG/ML
INJECTION, SOLUTION INFILTRATION; PERINEURAL PRN
Status: DISCONTINUED | OUTPATIENT
Start: 2019-05-22 | End: 2019-05-22

## 2019-05-22 RX ORDER — FUROSEMIDE 10 MG/ML
INJECTION INTRAMUSCULAR; INTRAVENOUS PRN
Status: DISCONTINUED | OUTPATIENT
Start: 2019-05-22 | End: 2019-05-22

## 2019-05-22 RX ORDER — CEFUROXIME SODIUM 1.5 G/16ML
1.5 INJECTION, POWDER, FOR SOLUTION INTRAVENOUS
Status: DISCONTINUED | OUTPATIENT
Start: 2019-05-22 | End: 2019-05-22 | Stop reason: HOSPADM

## 2019-05-22 RX ORDER — EPHEDRINE SULFATE 50 MG/ML
INJECTION, SOLUTION INTRAMUSCULAR; INTRAVENOUS; SUBCUTANEOUS PRN
Status: DISCONTINUED | OUTPATIENT
Start: 2019-05-22 | End: 2019-05-22

## 2019-05-22 RX ORDER — MANNITOL 20 G/100ML
INJECTION, SOLUTION INTRAVENOUS PRN
Status: DISCONTINUED | OUTPATIENT
Start: 2019-05-22 | End: 2019-05-22

## 2019-05-22 RX ORDER — NALOXONE HYDROCHLORIDE 0.4 MG/ML
.1-.4 INJECTION, SOLUTION INTRAMUSCULAR; INTRAVENOUS; SUBCUTANEOUS
Status: DISCONTINUED | OUTPATIENT
Start: 2019-05-22 | End: 2019-05-23

## 2019-05-22 RX ORDER — FENTANYL CITRATE 50 UG/ML
25-50 INJECTION, SOLUTION INTRAMUSCULAR; INTRAVENOUS
Status: DISCONTINUED | OUTPATIENT
Start: 2019-05-22 | End: 2019-05-22 | Stop reason: HOSPADM

## 2019-05-22 RX ORDER — SODIUM CHLORIDE, SODIUM LACTATE, POTASSIUM CHLORIDE, CALCIUM CHLORIDE 600; 310; 30; 20 MG/100ML; MG/100ML; MG/100ML; MG/100ML
1-3 INJECTION, SOLUTION INTRAVENOUS CONTINUOUS
Status: DISCONTINUED | OUTPATIENT
Start: 2019-05-22 | End: 2019-05-22 | Stop reason: HOSPADM

## 2019-05-22 RX ORDER — SODIUM CHLORIDE, SODIUM LACTATE, POTASSIUM CHLORIDE, CALCIUM CHLORIDE 600; 310; 30; 20 MG/100ML; MG/100ML; MG/100ML; MG/100ML
INJECTION, SOLUTION INTRAVENOUS CONTINUOUS
Status: DISCONTINUED | OUTPATIENT
Start: 2019-05-22 | End: 2019-05-22 | Stop reason: HOSPADM

## 2019-05-22 RX ORDER — NALOXONE HYDROCHLORIDE 0.4 MG/ML
.1-.4 INJECTION, SOLUTION INTRAMUSCULAR; INTRAVENOUS; SUBCUTANEOUS
Status: DISCONTINUED | OUTPATIENT
Start: 2019-05-22 | End: 2019-05-24 | Stop reason: HOSPADM

## 2019-05-22 RX ORDER — ACETAMINOPHEN 325 MG/1
975 TABLET ORAL EVERY 8 HOURS
Status: DISCONTINUED | OUTPATIENT
Start: 2019-05-22 | End: 2019-05-24 | Stop reason: HOSPADM

## 2019-05-22 RX ORDER — DEXTROSE, SODIUM CHLORIDE, SODIUM LACTATE, POTASSIUM CHLORIDE, AND CALCIUM CHLORIDE 5; .6; .31; .03; .02 G/100ML; G/100ML; G/100ML; G/100ML; G/100ML
INJECTION, SOLUTION INTRAVENOUS CONTINUOUS
Status: DISCONTINUED | OUTPATIENT
Start: 2019-05-22 | End: 2019-05-23

## 2019-05-22 RX ORDER — ONDANSETRON 4 MG/1
4 TABLET, ORALLY DISINTEGRATING ORAL EVERY 30 MIN PRN
Status: DISCONTINUED | OUTPATIENT
Start: 2019-05-22 | End: 2019-05-22 | Stop reason: HOSPADM

## 2019-05-22 RX ORDER — AMOXICILLIN 250 MG
1 CAPSULE ORAL 2 TIMES DAILY
Status: DISCONTINUED | OUTPATIENT
Start: 2019-05-22 | End: 2019-05-24 | Stop reason: HOSPADM

## 2019-05-22 RX ORDER — ONDANSETRON 2 MG/ML
4 INJECTION INTRAMUSCULAR; INTRAVENOUS ONCE
Status: DISCONTINUED | OUTPATIENT
Start: 2019-05-22 | End: 2019-05-22 | Stop reason: HOSPADM

## 2019-05-22 RX ORDER — MAGNESIUM SULFATE HEPTAHYDRATE 40 MG/ML
INJECTION, SOLUTION INTRAVENOUS PRN
Status: DISCONTINUED | OUTPATIENT
Start: 2019-05-22 | End: 2019-05-22

## 2019-05-22 RX ORDER — ONDANSETRON 2 MG/ML
4 INJECTION INTRAMUSCULAR; INTRAVENOUS EVERY 30 MIN PRN
Status: DISCONTINUED | OUTPATIENT
Start: 2019-05-22 | End: 2019-05-22 | Stop reason: HOSPADM

## 2019-05-22 RX ORDER — KETOROLAC TROMETHAMINE 30 MG/ML
15 INJECTION, SOLUTION INTRAMUSCULAR; INTRAVENOUS EVERY 8 HOURS
Status: COMPLETED | OUTPATIENT
Start: 2019-05-22 | End: 2019-05-24

## 2019-05-22 RX ORDER — ALPRAZOLAM 0.25 MG
0.5 TABLET ORAL
Status: DISCONTINUED | OUTPATIENT
Start: 2019-05-22 | End: 2019-05-24 | Stop reason: HOSPADM

## 2019-05-22 RX ORDER — ONDANSETRON 2 MG/ML
4 INJECTION INTRAMUSCULAR; INTRAVENOUS ONCE
Status: DISCONTINUED | OUTPATIENT
Start: 2019-05-22 | End: 2019-05-24 | Stop reason: HOSPADM

## 2019-05-22 RX ORDER — BUPIVACAINE HYDROCHLORIDE 2.5 MG/ML
INJECTION, SOLUTION EPIDURAL; INFILTRATION; INTRACAUDAL PRN
Status: DISCONTINUED | OUTPATIENT
Start: 2019-05-22 | End: 2019-05-22

## 2019-05-22 RX ORDER — PROPOFOL 10 MG/ML
INJECTION, EMULSION INTRAVENOUS PRN
Status: DISCONTINUED | OUTPATIENT
Start: 2019-05-22 | End: 2019-05-22

## 2019-05-22 RX ORDER — NALOXONE HYDROCHLORIDE 0.4 MG/ML
.1-.4 INJECTION, SOLUTION INTRAMUSCULAR; INTRAVENOUS; SUBCUTANEOUS
Status: DISCONTINUED | OUTPATIENT
Start: 2019-05-22 | End: 2019-05-22 | Stop reason: HOSPADM

## 2019-05-22 RX ORDER — PROCHLORPERAZINE MALEATE 5 MG
10 TABLET ORAL EVERY 6 HOURS PRN
Status: DISCONTINUED | OUTPATIENT
Start: 2019-05-22 | End: 2019-05-24 | Stop reason: HOSPADM

## 2019-05-22 RX ORDER — ONDANSETRON 2 MG/ML
INJECTION INTRAMUSCULAR; INTRAVENOUS PRN
Status: DISCONTINUED | OUTPATIENT
Start: 2019-05-22 | End: 2019-05-22

## 2019-05-22 RX ORDER — FENTANYL CITRATE 50 UG/ML
INJECTION, SOLUTION INTRAMUSCULAR; INTRAVENOUS PRN
Status: DISCONTINUED | OUTPATIENT
Start: 2019-05-22 | End: 2019-05-22

## 2019-05-22 RX ORDER — FLUMAZENIL 0.1 MG/ML
0.2 INJECTION, SOLUTION INTRAVENOUS
Status: DISCONTINUED | OUTPATIENT
Start: 2019-05-22 | End: 2019-05-22 | Stop reason: HOSPADM

## 2019-05-22 RX ADMIN — SUGAMMADEX 200 MG: 100 INJECTION, SOLUTION INTRAVENOUS at 10:33

## 2019-05-22 RX ADMIN — KETOROLAC TROMETHAMINE 10 MG: 30 INJECTION, SOLUTION INTRAMUSCULAR at 09:57

## 2019-05-22 RX ADMIN — SENNOSIDES AND DOCUSATE SODIUM 2 TABLET: 8.6; 5 TABLET ORAL at 21:04

## 2019-05-22 RX ADMIN — KETAMINE HYDROCHLORIDE 50 MG: 10 INJECTION, SOLUTION INTRAMUSCULAR; INTRAVENOUS at 06:35

## 2019-05-22 RX ADMIN — Medication 5 MG: at 10:19

## 2019-05-22 RX ADMIN — SODIUM CHLORIDE, POTASSIUM CHLORIDE, SODIUM LACTATE AND CALCIUM CHLORIDE: 600; 310; 30; 20 INJECTION, SOLUTION INTRAVENOUS at 08:21

## 2019-05-22 RX ADMIN — GABAPENTIN 300 MG: 300 CAPSULE ORAL at 06:03

## 2019-05-22 RX ADMIN — ROCURONIUM BROMIDE 10 MG: 10 INJECTION INTRAVENOUS at 08:48

## 2019-05-22 RX ADMIN — MANNITOL 12.5 G: 20 INJECTION, SOLUTION INTRAVENOUS at 09:14

## 2019-05-22 RX ADMIN — FENTANYL CITRATE 50 MCG: 50 INJECTION, SOLUTION INTRAMUSCULAR; INTRAVENOUS at 10:30

## 2019-05-22 RX ADMIN — OXYCODONE HYDROCHLORIDE 10 MG: 5 TABLET ORAL at 21:03

## 2019-05-22 RX ADMIN — ROCURONIUM BROMIDE 50 MG: 10 INJECTION INTRAVENOUS at 06:36

## 2019-05-22 RX ADMIN — SODIUM CHLORIDE, POTASSIUM CHLORIDE, SODIUM LACTATE AND CALCIUM CHLORIDE: 600; 310; 30; 20 INJECTION, SOLUTION INTRAVENOUS at 06:26

## 2019-05-22 RX ADMIN — CEFUROXIME 1.5 G: 1.5 INJECTION, POWDER, FOR SOLUTION INTRAVENOUS at 09:10

## 2019-05-22 RX ADMIN — Medication 0.3 MG: at 11:23

## 2019-05-22 RX ADMIN — GLYCOPYRROLATE 0.2 MG: 0.2 INJECTION, SOLUTION INTRAMUSCULAR; INTRAVENOUS at 07:03

## 2019-05-22 RX ADMIN — LIDOCAINE HYDROCHLORIDE 100 MG: 20 INJECTION, SOLUTION INFILTRATION; PERINEURAL at 06:35

## 2019-05-22 RX ADMIN — DEXAMETHASONE SODIUM PHOSPHATE 8 MG: 10 INJECTION, SOLUTION INTRAMUSCULAR; INTRAVENOUS at 06:38

## 2019-05-22 RX ADMIN — SODIUM CHLORIDE, POTASSIUM CHLORIDE, SODIUM LACTATE AND CALCIUM CHLORIDE: 600; 310; 30; 20 INJECTION, SOLUTION INTRAVENOUS at 07:51

## 2019-05-22 RX ADMIN — BUPIVACAINE HYDROCHLORIDE 20 ML: 2.5 INJECTION, SOLUTION EPIDURAL; INFILTRATION; INTRACAUDAL; PERINEURAL at 06:18

## 2019-05-22 RX ADMIN — LEVOTHYROXINE SODIUM 125 MCG: 25 TABLET ORAL at 21:04

## 2019-05-22 RX ADMIN — FUROSEMIDE 10 MG: 10 INJECTION, SOLUTION INTRAVENOUS at 09:12

## 2019-05-22 RX ADMIN — CEFUROXIME 1.5 G: 1.5 INJECTION, POWDER, FOR SOLUTION INTRAVENOUS at 07:10

## 2019-05-22 RX ADMIN — HEPARIN SODIUM 2000 UNITS: 1000 INJECTION, SOLUTION INTRAVENOUS; SUBCUTANEOUS at 09:17

## 2019-05-22 RX ADMIN — ROCURONIUM BROMIDE 20 MG: 10 INJECTION INTRAVENOUS at 07:59

## 2019-05-22 RX ADMIN — ROCURONIUM BROMIDE 20 MG: 10 INJECTION INTRAVENOUS at 09:11

## 2019-05-22 RX ADMIN — RANITIDINE 150 MG: 150 TABLET, FILM COATED ORAL at 21:03

## 2019-05-22 RX ADMIN — BUPIVACAINE 266 MG: 13.3 INJECTION, SUSPENSION, LIPOSOMAL INFILTRATION at 06:18

## 2019-05-22 RX ADMIN — ONDANSETRON 4 MG: 2 INJECTION INTRAMUSCULAR; INTRAVENOUS at 10:09

## 2019-05-22 RX ADMIN — SODIUM CHLORIDE, SODIUM LACTATE, POTASSIUM CHLORIDE, CALCIUM CHLORIDE AND DEXTROSE MONOHYDRATE: 5; 600; 310; 30; 20 INJECTION, SOLUTION INTRAVENOUS at 11:00

## 2019-05-22 RX ADMIN — MIDAZOLAM 1 MG: 1 INJECTION INTRAMUSCULAR; INTRAVENOUS at 06:26

## 2019-05-22 RX ADMIN — Medication 10 MG: at 07:02

## 2019-05-22 RX ADMIN — ACETAMINOPHEN 975 MG: 325 TABLET, FILM COATED ORAL at 06:03

## 2019-05-22 RX ADMIN — GABAPENTIN 600 MG: 300 CAPSULE ORAL at 06:03

## 2019-05-22 RX ADMIN — FENTANYL CITRATE 50 MCG: 50 INJECTION, SOLUTION INTRAMUSCULAR; INTRAVENOUS at 06:26

## 2019-05-22 RX ADMIN — ACETAMINOPHEN 975 MG: 325 TABLET, FILM COATED ORAL at 21:04

## 2019-05-22 RX ADMIN — ROCURONIUM BROMIDE 20 MG: 10 INJECTION INTRAVENOUS at 07:16

## 2019-05-22 RX ADMIN — OXYCODONE HYDROCHLORIDE 5 MG: 5 TABLET ORAL at 13:45

## 2019-05-22 RX ADMIN — ACETAMINOPHEN 975 MG: 325 TABLET, FILM COATED ORAL at 13:45

## 2019-05-22 RX ADMIN — OXYCODONE HYDROCHLORIDE 5 MG: 5 TABLET ORAL at 17:04

## 2019-05-22 RX ADMIN — KETAMINE HYDROCHLORIDE 20 MG/HR: 100 INJECTION, SOLUTION, CONCENTRATE INTRAMUSCULAR; INTRAVENOUS at 08:02

## 2019-05-22 RX ADMIN — FENTANYL CITRATE 25 MCG: 50 INJECTION, SOLUTION INTRAMUSCULAR; INTRAVENOUS at 11:09

## 2019-05-22 RX ADMIN — MAGNESIUM SULFATE HEPTAHYDRATE 2 G: 40 INJECTION, SOLUTION INTRAVENOUS at 07:05

## 2019-05-22 RX ADMIN — KETOROLAC TROMETHAMINE 15 MG: 30 INJECTION, SOLUTION INTRAMUSCULAR at 18:25

## 2019-05-22 RX ADMIN — PROTAMINE SULFATE 20 MG: 10 INJECTION, SOLUTION INTRAVENOUS at 09:20

## 2019-05-22 RX ADMIN — FENTANYL CITRATE 200 MCG: 50 INJECTION, SOLUTION INTRAMUSCULAR; INTRAVENOUS at 06:35

## 2019-05-22 RX ADMIN — PROPOFOL 100 MG: 10 INJECTION, EMULSION INTRAVENOUS at 06:35

## 2019-05-22 ASSESSMENT — MIFFLIN-ST. JEOR: SCORE: 1614.88

## 2019-05-22 ASSESSMENT — ACTIVITIES OF DAILY LIVING (ADL)
ADLS_ACUITY_SCORE: 13
ADLS_ACUITY_SCORE: 17

## 2019-05-22 ASSESSMENT — PAIN DESCRIPTION - DESCRIPTORS
DESCRIPTORS: SORE
DESCRIPTORS: SORE;TENDER

## 2019-05-22 NOTE — OP NOTE
Transplant Surgery  Operative Note     Procedure Date:  05/22/19    Preoperative Diagnosis:  Healthy kidney donor    Postoperative Diagnosis:  Healthy kidney donor    Procedure:  1. Left Kidney  living donor nephrectomy for donation    Secondary Procedure:    None    Surgeon:    Surgeon(s) and Role:     * Alfa Elizalde MD - Primary     * Peter Collins MD - Assisting     * Rena Jaquez MD - Resident - Assisting    Fellow/Assistant:    Peter Collins, fellow, Rena Jaquez, resident      Specimen:  Left kidney and ureter    Anesthesia:    General    Urine Output: 935 mls  Estimated Blood Loss: 30 mls   Fluids administered:      Intra Op Events: None unexpected     Complications: None.    Findings: normal anatomy        Donor UNOS ID:    [id number]  Flush Start time:  5/22/2019  9:19 AM    Arterial Clamp:    5/22/2019  9:18 AM  Arterial anatomy:  single    Venous anatomy:    single  Ureteral anatomy:   single     Indication: Elle Hill presents for Left Kidney donation. The patient has undergone a thorough medical and psychosocial evaluation and was found suitable for voluntary kidney donation. Risks and benefits of donation were discussed. The patient expressed understanding, was willing to proceed, and provided informed consent.    Final ABO/Crossmatch verification: Prior to incision, I verified the donor ABO and recipient ABO. I visually verified that the donor identification, blood type, and other vital data were compatible with the recipient.      Operative Procedure:  Elle Hill was transported to the operating room, placed on the operating table in the right lateral decubitus position, and a universal timeout was performed. Sequential compression devices were placed on both lower extremities and general endotracheal anesthesia was induced. The patient was given IV antibiotics and Euceda catheter.  The abdomen was shaved, prepped, and draped in the usual sterile fashion.    We made a 6 cm upper  midline incision and carried down thru the linea alba. The peritoneum was opened under direct vision. The hand port was put into position and a left lower quadrant 10 mm port was placed over a trocar with hand assistance. Pneumoperitoneum with CO2 was provided to 12 mmHg. General survey with the laparoscope revealed no unusual findings. An additional 10 mm port was placed in the left lateral abdomen under direct vision.     We released the left colon from its lateral attachments and rotated medially, revealing the kidney and ureter. The ureter was circumferentially dissected free distally toward the pelvis then kidney taking care to preserve its vasculature. The gonadal vein was identified and dissected enbloc with the ureter, and the proximal gonadal vein was doubly ligated and divided near the insertion into the left renal vein. The left adrenal vein was likewise identified, ligated and divided. The renal vein was then circumferentially cleared of extraneous tissue. The kidney and ureter were dissected free posteriorly from the psoas and multiple lumbar veins were clipped and divided.     We created a plane between the left adrenal gland and the upper pole of the kidney with the harmonic scalpel and the upper pole attachments were released. The anterior and inferior aspects of the renal artery at its origin were cleared of investing lymphatics. The patient was given fluid, mannitol and lasix, and the kidney was then dissected free from its lateral attachments allowing full medial rotation. The remaining lymphatics and fat around the renal artery was cleared. The patient was heparinized and the distal ureter and gonadal vein were clipped and divided. Good urine flow was seen. A laparoscopic ALEXANDRE stapler was fired across the renal artery and then the renal vein.     The kidney was delivered from the hand port, flushed, and placed in cold storage. Protamine was administered for full heparin reversal. Pneumoperitoneum  was reestablished and hemostasis was obtained. Vascular transection sites were visualized and confirmed secure. The colon was placed back in its natural position. The 10 mm port sites were closed with 0-vicryl. The hand port was closed with 0-PDS. Skin incisions were irrigated and closed with 4-0 monocryl and Dermabond. Needle, sponge, and instrument counts were correct x2.  Faculty was present for key portions of the procedure. The patient tolerated the procedure well without apparent complications and was extubated and transferred to PACU in good condition.   I was present for the mobilization of the kidney, stapling of vessels, flushing and packing of kidney and closure.

## 2019-05-22 NOTE — ANESTHESIA PROCEDURE NOTES
Peripheral Nerve Block Procedure Note    Staff:     Anesthesiologist:  Everett Rey DO    Resident/CRNA:  Denis Box MD    Block performed by resident/CRNA in the presence of a teaching physician    Location: Pre-op  Procedure Start/Stop TImes:      5/22/2019 6:17 AM     5/22/2019 6:24 AM    patient identified, IV checked, site marked, risks and benefits discussed, informed consent, monitors and equipment checked, pre-op evaluation, at physician/surgeon's request and post-op pain management      Correct Patient: Yes      Correct Position: Yes      Correct Site: Yes      Correct Procedure: Yes      Correct Laterality:  Yes    Site Marked:  Yes  Procedure details:     Procedure:  TAP    ASA:  1    Diagnosis:  Perioperative Pain    Laterality:  Bilateral    Position:  Supine    Sterile Prep: chloraprep, mask and sterile gloves      Needle:  Short bevel    Needle gauge:  21    Needle length (mm):  110    Ultrasound: Yes      Ultrasound used to identify targeted nerve, plexus, or vascular structure and placed a needle adjacent to it      Permanent Image entered into patiient's record      Abnormal pain on injection: No      Blood Aspirated: No      Paresthesias:  No    Bleeding at site: No      Bolus via:  Needle    Infusion Method:  Single Shot    Complications:  None  Assessment/Narrative:     Injection made incrementally with aspirations every (mL):  5     266 mg of exparel administered for TAP blocks.    Discussed with Patient Off-Label use of Liposomal Bupivacaine (Exparel) for Nerve Block.    Relevant risks & benefits were discussed with patient.    All questions were answered and there was agreement to proceed.    Patient signed Off-Label Use of Exparel Consent Form.

## 2019-05-22 NOTE — OR NURSING
Dr. Louise to bedside to see pt. Pt c/o bilateral shoulder soreness. Placed warmer pacs over gown on both shoulders per Dr. Cat' request. Ok for transfer to floor. Awaiting transport.

## 2019-05-22 NOTE — PLAN OF CARE
"Focus: Post op kidney donor, DOS  D/I: /69   Pulse 74   Temp 98.6  F (37  C) (Oral)   Resp 12   Ht 1.651 m (5' 5\")   Wt 90.9 kg (200 lb 6.4 oz)   LMP 05/21/2019   SpO2 95%   BMI 33.35 kg/m  . Capnography CO2 35, IPI 10. Requiring 2-3LNC. Midline incision and 2 lap sites dermbonded, C/D. Pain on left side of abdomen and bilateral shoulders, treated with oxycodone 5 mg and ES tylenol. Sleeping in btw cares. MIV at 60cc/hour. Good UO via borges, clear yellow urine. Drinking water, advanced diet as tolerated. Reeducated and dncouraged IS use.  A/P: Continued frequent post op VS and pain assessment. OOB when more alert. Family present and supportive.     "

## 2019-05-22 NOTE — ANESTHESIA CARE TRANSFER NOTE
Patient: Elle Hill    Procedure(s):  Laparoscopic Hand Assisted  Unrelated Nondirected Kidney Donor    Diagnosis: Kidney Donor  Diagnosis Additional Information: No value filed.    Anesthesia Type:   No value filed.     Note:  Airway :Face Mask  Patient transferred to:PACU  Handoff Report: Identifed the Patient, Identified the Reponsible Provider, Reviewed the pertinent medical history, Discussed the surgical course, Reviewed Intra-OP anesthesia mangement and issues during anesthesia, Set expectations for post-procedure period and Allowed opportunity for questions and acknowledgement of understanding      Vitals: (Last set prior to Anesthesia Care Transfer)    CRNA VITALS  5/22/2019 1011 - 5/22/2019 1046      5/22/2019             Resp Rate (observed):  2  (Abnormal)                 Electronically Signed By: ELDON Parikh CRNA  May 22, 2019  10:46 AM

## 2019-05-22 NOTE — LETTER
Transition Communication Hand-off for Care Transitions to Next Level of Care Provider    Name: Elle Hill  : 1985  MRN #: [id number]  Primary Care Provider: Taylor Escobedo     Primary Clinic: [address] [address]     Reason for Hospitalization:  Kidney donor [Z52.4]  Admit Date/Time: 2019  5:19 AM  Discharge Date: 19  Payor Source: No coverage found.               Reason for Communication Hand-off Referral: Other K donor    Discharge Plan:       Concern for non-adherence with plan of care:   Y/N N  Discharge Needs Assessment:      Follow-up specialty is recommended: Yes    Follow-up plan:  No future appointments.    Any outstanding tests or procedures:              Key Recommendations:      Zara Aceves    AVS/Discharge Summary is the source of truth; this is a helpful guide for improved communication of patient story

## 2019-05-22 NOTE — PHARMACY-TRANSPLANT NOTE
D/I: 34 year old female s/p kidney. donation surgery on 5/22/2019.  Medications have been reviewed by the pharmacist for efficacy, appropriate dose, medication interactions and potential adverse effects.      A: Medications reviewed for this patient as above. No medication issues were noted.  P: Pharmacy will continue to monitor for any potential medication issues, and will make recommendations as appropriate. Medication therapy needs for discharge planning will continue to be addressed throughout the current admission via multidisciplinary rounds and order review.    Scott Tolentino, PharmD, BCPS

## 2019-05-22 NOTE — ANESTHESIA POSTPROCEDURE EVALUATION
Anesthesia POST Procedure Evaluation    Patient: Elle Hill   MRN:     [id number] Gender:   female   Age:    34 year old :      1985        Preoperative Diagnosis: Kidney Donor   Procedure(s):  Laparoscopic Hand Assisted  Unrelated Nondirected Kidney Donor   Postop Comments: No value filed.       Anesthesia Type:  General, Regional  No value filed.    Reportable Event: NO     PAIN: Uncomplicated   Sign Out status: Comfortable, Well controlled pain     PONV: No PONV   Sign Out status:  No Nausea or Vomiting     Neuro/Psych: Uneventful perioperative course   Sign Out Status: Preoperative baseline; Age appropriate mentation     Airway/Resp.: Uneventful perioperative course   Sign Out Status: Non labored breathing, age appropriate RR; Resp. Status within EXPECTED Parameters     CV: Uneventful perioperative course   Sign Out status: Appropriate BP and perfusion indices; Appropriate HR/Rhythm     Disposition:   Sign Out in:  PACU  Disposition:  Floor  Recovery Course: Uneventful  Follow-Up: Not required     Comments/Narrative:  Some mild left shoulder pain was reported. This is probably 2/2 positioning. We will use a warm compress to try and relieve the discomfort.            Last Anesthesia Record Vitals:  CRNA VITALS  2019 1011 - 2019 1111      2019             NIBP:  89/45  (Abnormal)     Pulse:  81          Last PACU Vitals:  Vitals Value Taken Time   BP 96/68 2019 12:20 PM   Temp 36.4  C (97.6  F) 2019 11:45 AM   Pulse 67 2019 12:20 PM   Resp 16 2019 12:50 PM   SpO2 96 % 2019 12:50 PM   Temp src     NIBP 89/45 2019 10:45 AM   Pulse 81 2019 10:45 AM   SpO2     Resp     Temp     Ht Rate     Temp 2     Vitals shown include unvalidated device data.      Electronically Signed By: Laz Schrader MD, May 22, 2019, 12:56 PM

## 2019-05-22 NOTE — PROGRESS NOTES
"Post Op Check    05/22/2019    Elle Hill is a 34 year old female with h/o hypothyroidism and anxiety who is now POD#0 s/p left kidney living donor nephrectomy for donation.    Pt reports she is feeling well since surgery. Pain is well-controlled and she denies nausea. Denies SOB, chest pain, or dizziness. Euceda catheter remains in place.    /69   Pulse 74   Temp 98.6  F (37  C) (Oral)   Resp 12   Ht 1.651 m (5' 5\")   Wt 90.9 kg (200 lb 6.4 oz)   LMP 05/21/2019   SpO2 95%   BMI 33.35 kg/m      Gen: A&O x3, NAD  Chest: breathing non-labored on nasal cannula  Abdomen: soft, non-tender, non-distended  Incision: clean, dry, intact with dermabond  Extremities: warm and well perfused    A/P: No acute post-op issues. Continue plan of care per primary team. Please call with any questions.    Mindy Robison NP  Transplant Surgery  Pager [phone]    "

## 2019-05-22 NOTE — OR NURSING
Dr. Louise okayed pt's transfer to  for further care. Will see patient on floor and write sign-out.

## 2019-05-23 ENCOUNTER — CARE COORDINATION (OUTPATIENT)
Dept: TRANSPLANT | Facility: CLINIC | Age: 34
End: 2019-05-23

## 2019-05-23 LAB
BUN SERPL-MCNC: 14 MG/DL (ref 7–30)
CREAT SERPL-MCNC: 1.28 MG/DL (ref 0.52–1.04)
GFR SERPL CREATININE-BSD FRML MDRD: 55 ML/MIN/{1.73_M2}
HCT VFR BLD AUTO: 33 % (ref 35–47)
HGB BLD-MCNC: 10.4 G/DL (ref 11.7–15.7)

## 2019-05-23 PROCEDURE — 82565 ASSAY OF CREATININE: CPT | Performed by: STUDENT IN AN ORGANIZED HEALTH CARE EDUCATION/TRAINING PROGRAM

## 2019-05-23 PROCEDURE — 85018 HEMOGLOBIN: CPT | Performed by: STUDENT IN AN ORGANIZED HEALTH CARE EDUCATION/TRAINING PROGRAM

## 2019-05-23 PROCEDURE — 25000132 ZZH RX MED GY IP 250 OP 250 PS 637: Performed by: STUDENT IN AN ORGANIZED HEALTH CARE EDUCATION/TRAINING PROGRAM

## 2019-05-23 PROCEDURE — 25000132 ZZH RX MED GY IP 250 OP 250 PS 637: Performed by: NURSE PRACTITIONER

## 2019-05-23 PROCEDURE — 36415 COLL VENOUS BLD VENIPUNCTURE: CPT | Performed by: STUDENT IN AN ORGANIZED HEALTH CARE EDUCATION/TRAINING PROGRAM

## 2019-05-23 PROCEDURE — 99231 SBSQ HOSP IP/OBS SF/LOW 25: CPT | Performed by: NURSE PRACTITIONER

## 2019-05-23 PROCEDURE — 84520 ASSAY OF UREA NITROGEN: CPT | Performed by: STUDENT IN AN ORGANIZED HEALTH CARE EDUCATION/TRAINING PROGRAM

## 2019-05-23 PROCEDURE — 25000128 H RX IP 250 OP 636: Performed by: TRANSPLANT SURGERY

## 2019-05-23 PROCEDURE — 25000128 H RX IP 250 OP 636: Performed by: STUDENT IN AN ORGANIZED HEALTH CARE EDUCATION/TRAINING PROGRAM

## 2019-05-23 PROCEDURE — 12000026 ZZH R&B TRANSPLANT

## 2019-05-23 PROCEDURE — 85014 HEMATOCRIT: CPT | Performed by: STUDENT IN AN ORGANIZED HEALTH CARE EDUCATION/TRAINING PROGRAM

## 2019-05-23 RX ORDER — LEVOTHYROXINE SODIUM 125 UG/1
125 TABLET ORAL
Status: DISCONTINUED | OUTPATIENT
Start: 2019-05-24 | End: 2019-05-24 | Stop reason: HOSPADM

## 2019-05-23 RX ORDER — BISACODYL 10 MG
10 SUPPOSITORY, RECTAL RECTAL DAILY PRN
Status: DISCONTINUED | OUTPATIENT
Start: 2019-05-23 | End: 2019-05-24 | Stop reason: HOSPADM

## 2019-05-23 RX ORDER — POLYETHYLENE GLYCOL 3350 17 G/17G
17 POWDER, FOR SOLUTION ORAL DAILY
Status: COMPLETED | OUTPATIENT
Start: 2019-05-23 | End: 2019-05-24

## 2019-05-23 RX ORDER — FUROSEMIDE 20 MG/1
10 TABLET ORAL ONCE
Status: COMPLETED | OUTPATIENT
Start: 2019-05-23 | End: 2019-05-23

## 2019-05-23 RX ORDER — OXYCODONE HYDROCHLORIDE 5 MG/1
5-10 TABLET ORAL EVERY 4 HOURS PRN
Status: DISCONTINUED | OUTPATIENT
Start: 2019-05-23 | End: 2019-05-24 | Stop reason: HOSPADM

## 2019-05-23 RX ORDER — HYDROMORPHONE HYDROCHLORIDE 1 MG/ML
.3-.5 INJECTION, SOLUTION INTRAMUSCULAR; INTRAVENOUS; SUBCUTANEOUS
Status: DISCONTINUED | OUTPATIENT
Start: 2019-05-23 | End: 2019-05-24

## 2019-05-23 RX ORDER — BISACODYL 10 MG
10 SUPPOSITORY, RECTAL RECTAL ONCE
Status: COMPLETED | OUTPATIENT
Start: 2019-05-23 | End: 2019-05-23

## 2019-05-23 RX ADMIN — Medication 10 MG: at 12:08

## 2019-05-23 RX ADMIN — SENNOSIDES AND DOCUSATE SODIUM 2 TABLET: 8.6; 5 TABLET ORAL at 08:24

## 2019-05-23 RX ADMIN — KETOROLAC TROMETHAMINE 15 MG: 30 INJECTION, SOLUTION INTRAMUSCULAR at 01:40

## 2019-05-23 RX ADMIN — ACETAMINOPHEN 975 MG: 325 TABLET, FILM COATED ORAL at 15:45

## 2019-05-23 RX ADMIN — MAGNESIUM HYDROXIDE 30 ML: 400 SUSPENSION ORAL at 15:46

## 2019-05-23 RX ADMIN — SENNOSIDES AND DOCUSATE SODIUM 2 TABLET: 8.6; 5 TABLET ORAL at 21:47

## 2019-05-23 RX ADMIN — POLYETHYLENE GLYCOL 3350 17 G: 17 POWDER, FOR SOLUTION ORAL at 12:08

## 2019-05-23 RX ADMIN — KETOROLAC TROMETHAMINE 15 MG: 30 INJECTION, SOLUTION INTRAMUSCULAR at 12:11

## 2019-05-23 RX ADMIN — RANITIDINE 150 MG: 150 TABLET, FILM COATED ORAL at 08:24

## 2019-05-23 RX ADMIN — ACETAMINOPHEN 975 MG: 325 TABLET, FILM COATED ORAL at 05:03

## 2019-05-23 RX ADMIN — OXYCODONE HYDROCHLORIDE 10 MG: 5 TABLET ORAL at 12:07

## 2019-05-23 RX ADMIN — RANITIDINE 150 MG: 150 TABLET, FILM COATED ORAL at 21:47

## 2019-05-23 RX ADMIN — OXYCODONE HYDROCHLORIDE 5 MG: 5 TABLET ORAL at 08:23

## 2019-05-23 RX ADMIN — BISACODYL 10 MG: 10 SUPPOSITORY RECTAL at 15:49

## 2019-05-23 RX ADMIN — OXYCODONE HYDROCHLORIDE 10 MG: 5 TABLET ORAL at 17:55

## 2019-05-23 RX ADMIN — ACETAMINOPHEN 975 MG: 325 TABLET, FILM COATED ORAL at 21:47

## 2019-05-23 RX ADMIN — SODIUM CHLORIDE, SODIUM LACTATE, POTASSIUM CHLORIDE, CALCIUM CHLORIDE AND DEXTROSE MONOHYDRATE: 5; 600; 310; 30; 20 INJECTION, SOLUTION INTRAVENOUS at 01:43

## 2019-05-23 RX ADMIN — KETOROLAC TROMETHAMINE 15 MG: 30 INJECTION, SOLUTION INTRAMUSCULAR at 21:48

## 2019-05-23 ASSESSMENT — ACTIVITIES OF DAILY LIVING (ADL)
ADLS_ACUITY_SCORE: 17

## 2019-05-23 ASSESSMENT — MIFFLIN-ST. JEOR: SCORE: 1627.59

## 2019-05-23 NOTE — PROGRESS NOTES
REGIONAL ANESTHESIA PAIN SERVICE  SUBJECTIVE  Interval history: Patient reports postoperative abdominal pain 6/10 at rest and 7/10 with sit to stand, partially effective pain control with nerve block and current analgesics (see below).  Ambulated in [place] x 1 today, tolerating regular diet, feels distended, denies nausea, hasn't passed flatus yet.  Also reports right shoulder referred pain when walking that resolves at rest.  Denies any weakness, paresthesias, circumoral numbness, metallic taste or tinnitus.        Medications related to Pain Management (From now, onward)    Start     Dose/Rate Route Frequency Ordered Stop    05/23/19 1015  bisacodyl (DULCOLAX) Suppository 10 mg      10 mg Rectal ONCE 05/23/19 1006      05/23/19 1015  oxyCODONE (ROXICODONE) tablet 5-10 mg      5-10 mg Oral EVERY 4 HOURS PRN 05/23/19 1008      05/23/19 1009  HYDROmorphone (PF) (DILAUDID) injection 0.3-0.5 mg      0.3-0.5 mg Intravenous EVERY 1 HOUR PRN 05/23/19 1009      05/23/19 0800  polyethylene glycol (MIRALAX/GLYCOLAX) Packet 17 g      17 g Oral DAILY 05/23/19 0751 05/25/19 0759    05/23/19 0800  magnesium hydroxide (MILK OF MAGNESIA) suspension 30 mL      30 mL Oral DAILY 05/23/19 0751 05/25/19 0759    05/23/19 0750  bisacodyl (DULCOLAX) Suppository 10 mg      10 mg Rectal DAILY PRN 05/23/19 0751      05/22/19 2000  senna-docusate (SENOKOT-S/PERICOLACE) 8.6-50 MG per tablet 1 tablet      1 tablet Oral 2 TIMES DAILY 05/22/19 1303      05/22/19 2000  senna-docusate (SENOKOT-S/PERICOLACE) 8.6-50 MG per tablet 2 tablet      2 tablet Oral 2 TIMES DAILY 05/22/19 1303      05/22/19 1800  ketorolac (TORADOL) injection 15 mg      15 mg Intravenous EVERY 8 HOURS 05/22/19 1312 05/24/19 0959    05/22/19 1315  acetaminophen (TYLENOL) tablet 975 mg      975 mg Oral EVERY 8 HOURS 05/22/19 1303 05/25/19 1314    05/22/19 1304  ALPRAZolam (XANAX) tablet 0.5 mg      0.5 mg Oral AT BEDTIME PRN 05/22/19 1304      05/22/19 1051  bupivacaine liposome  "(EXPAREL) LONG ACTING injection was administered into the infiltration site to produce postsurgical analgesia. Duration of action is up to 72 hours, and other \"magno\" medications should not be given for 96 hours with the exception of the lidocaine 5% patch (LIDODERM) and the lidocaine 10mg in potassium infusions. This entry is for INFORMATION ONLY.       Does not apply CONTINUOUS PRN 05/22/19 1051 05/26/19 1050          OBJECTIVE:    /67 (BP Location: Right arm)   Pulse 56   Temp 98.3  F (36.8  C) (Oral)   Resp 16   Ht 1.651 m (5' 5\")   Wt 92.2 kg (203 lb 3.2 oz)   LMP 05/21/2019   SpO2 96%   BMI 33.81 kg/m    Exam:  General: alert, sitting semi-upright in bed and no distress  Neuro: Strength upper and lower extremities 5/5    ASSESSMENT/PLAN:    Elle Hill is a 34 year old female kidney donor who is now POD #1 s/p NEPHRECTOMY, NONDIRECTED DONOR, HAND-ASSISTED, LAPAROSCOPIC with single shot injection bilateral transversus abdominis plane (TAP) nerve block.  Total bupivacaine 0.25% 20 mL and liposomal (long-acting) bupivacaine (Exparel) 1.3% 20 mL administered 5/22/19 for postop pain control.  No weakness or paresthesias.  No evidence of adverse side effects associated with nerve block injections.  Acheiving adequate pain control, with nerve block, oral and IV nonopioid/opioid analgesics.  Anticipate 48-72 hours of pain control with long-acting local anesthetic. Additionally, patient will continue to require multimodal analgesia for visceral/muscle pain not controlled with long-acting local anesthetic.      - NO other local anesthetic use within 96 hours of liposomal bupivacaine (Exparel), unless approved by [facility]  - patient received verbal and written instructions about liposomal bupivacaine and counseling about pharmacologic and nonpharmacologic measures for acute postoperative pain management  - please call [facility] if questions or concerns    ELDON Squires CNP  Regional Anesthesia Pain " Service ([facility])  5/23/2019 11:07 AM    [facility] Contact Info (for in-house use only):  Job code ID: [id number]   [place] 0599  Peds [phone]  Alex phone: dial [phone], enter jobcode ID, then enter call-back number.    Text: Use Spreadsave on the Intranet <Paging/Directory> tab and enter Jobcode ID.   If no call back at any time, contact the hospital  and ask for [facility] attending or backup

## 2019-05-23 NOTE — PROGRESS NOTES
Transplant Surgery  Inpatient Daily Progress Note  05/23/2019    Ms. Hill is Post-operative day #1 s/p laparoscopic left donor nephrectomy. Issues Overnight: Incisional pain this morning after long period without analgesic.     Patient Vitals for the past 24 hrs:   BP Temp Temp src Pulse Heart Rate Resp SpO2 Weight   05/23/19 1004 -- -- -- -- -- -- -- 92.2 kg (203 lb 3.2 oz)   05/23/19 0833 106/67 98.3  F (36.8  C) Oral 56 56 16 96 % --   05/23/19 0400 98/55 98.4  F (36.9  C) Oral -- 56 14 98 % --   05/23/19 0018 116/59 98.4  F (36.9  C) Oral -- 75 16 96 % --   05/22/19 1950 121/74 98.9  F (37.2  C) Oral -- 80 16 96 % --   05/22/19 1700 110/68 98.7  F (37.1  C) Oral -- 72 15 95 % --   05/22/19 1600 106/78 -- -- -- 97 15 97 % --   05/22/19 1500 114/56 98.8  F (37.1  C) Oral -- 78 16 95 % --   05/22/19 1430 115/69 -- -- 74 -- -- 95 % --   05/22/19 1400 116/71 -- -- 83 -- -- -- --   05/22/19 1345 117/72 -- -- 94 -- 12 96 % --   05/22/19 1330 (!) 111/91 -- -- 90 -- -- -- --   05/22/19 1315 101/71 98.6  F (37  C) Oral 94 -- 13 96 % --   05/22/19 1310 118/88 -- -- 95 -- -- -- --   05/22/19 1250 -- -- -- -- -- 16 96 % --   05/22/19 1241 -- -- -- -- -- -- 95 % --   05/22/19 1210 -- -- -- -- -- 16 96 % --   05/22/19 1145 94/66 97.6  F (36.4  C) Oral -- 57 14 98 % --   05/22/19 1130 94/65 -- -- 59 59 12 99 % --       Pain: Visual Analog Score: 7  Nausea:    [x]    None      []    Some, but not needing medication    []    Yes, needing medication      []    Dry Retching    []    Vomited    DREAMS Performance:    DRinking: y   Eating: y   Analgesia: fair   Mobilizing:  y   Slept: y    Passed flatus? No  Incision(s): C/D/I with no pedro-incisional ecchymoses  Abdomen: mild distention, appropriately tender, soft  Extremities: no cyanosis or edema     Allergies:   No Known Allergies    Medications:  Prescription Medications as of 5/23/2019       Rx Number Disp Refills Start End Last Dispensed Date Next Fill Date Owning Pharmacy     "ALPRAZolam (XANAX) 0.5 MG tablet    11/22/2017        Sig: TAKE 1 TABLET BY MOUTH UP THREE TIMES DAILY IN 24 HOURS AS NEEDED FOR ANXIETY    Class: Historical    CALCIUM-VITAMIN D PO            Sig: Take 1 tablet by mouth daily    Class: Historical    Route: Oral    levothyroxine (SYNTHROID) 125 MCG tablet            Sig: Take 125 mcg by mouth daily    Class: Historical    Route: Oral    Magnesium Oxide 250 MG TABS            Sig: Take 1 tablet by mouth daily    Class: Historical    Route: Oral    melatonin 5 MG tablet            Sig: Take 5 mg by mouth nightly as needed for sleep    Class: Historical    Route: Oral    Omega-3 Fatty Acids (FISH OIL PO)            Sig: Take 1 capsule by mouth daily    Class: Historical    Route: Oral      Hospital Medications as of 5/23/2019       Dose Frequency Start End    acetaminophen (TYLENOL) tablet 975 mg 975 mg EVERY 8 HOURS 5/22/2019 5/25/2019    Sig: Take 3 tablets (975 mg) by mouth every 8 hours    Class: E-Prescribe    Route: Oral    ALPRAZolam (XANAX) tablet 0.5 mg 0.5 mg AT BEDTIME PRN 5/22/2019     Sig: Take 2 tablets (0.5 mg) by mouth nightly as needed for anxiety    Class: E-Prescribe    Route: Oral    bisacodyl (DULCOLAX) Suppository 10 mg 10 mg DAILY PRN 5/23/2019     Sig: Place 1 suppository (10 mg) rectally daily as needed for constipation    Class: E-Prescribe    Route: Rectal    bisacodyl (DULCOLAX) Suppository 10 mg 10 mg ONCE 5/23/2019     Sig: Place 1 suppository (10 mg) rectally once    Class: E-Prescribe    Route: Rectal    bupivacaine liposome (EXPAREL) LONG ACTING injection was administered into the infiltration site to produce postsurgical analgesia. Duration of action is up to 72 hours, and other \"magno\" medications should not be given for 96 hours with the exception of the lidocaine 5% patch (LIDODERM) and the lidocaine 10mg in potassium infusions. This entry is for INFORMATION ONLY.  CONTINUOUS PRN 5/22/2019 5/26/2019    Sig: continuous prn    Class: " "E-Prescribe    Route: Does not apply    furosemide (LASIX) half-tab 10 mg 10 mg ONCE 5/23/2019     Sig: Take 1 half-tab (10 mg) by mouth once    Class: E-Prescribe    Route: Oral    HYDROmorphone (PF) (DILAUDID) injection 0.3-0.5 mg 0.3-0.5 mg EVERY 1 HOUR PRN 5/23/2019     Sig: Inject 0.3-0.5 mg into the vein every hour as needed for other (For optimal opioid multimodal pain management to improve pain control and physical function.)    Class: E-Prescribe    Route: Intravenous    ketorolac (TORADOL) injection 15 mg 15 mg EVERY 8 HOURS 5/22/2019 5/24/2019    Sig: Inject 0.5 mLs (15 mg) into the vein every 8 hours    Class: E-Prescribe    Route: Intravenous    levothyroxine (SYNTHROID/LEVOTHROID) tablet 125 mcg 125 mcg DAILY 5/22/2019     Sig: Take 125 mcg by mouth daily    Class: E-Prescribe    Route: Oral    magnesium hydroxide (MILK OF MAGNESIA) suspension 30 mL 30 mL DAILY 5/23/2019 5/25/2019    Sig: Take 30 mLs by mouth daily    Class: E-Prescribe    Route: Oral    melatonin tablet 5 mg 5 mg AT BEDTIME PRN 5/22/2019     Sig: Take 1 tablet (5 mg) by mouth nightly as needed for sleep    Class: E-Prescribe    Route: Oral    metoclopramide (REGLAN) injection 10 mg 10 mg EVERY 6 HOURS PRN 5/22/2019     Sig: Inject 2 mLs (10 mg) into the vein every 6 hours as needed for nausea or vomiting    Class: E-Prescribe    Route: Intravenous    Linked Group 1:  \"Or\" Linked Group Details        metoclopramide (REGLAN) tablet 10 mg 10 mg EVERY 6 HOURS PRN 5/22/2019     Sig: Take 2 tablets (10 mg) by mouth every 6 hours as needed for nausea or vomiting    Class: E-Prescribe    Route: Oral    Linked Group 1:  \"Or\" Linked Group Details        naloxone (NARCAN) injection 0.1-0.4 mg 0.1-0.4 mg EVERY 2 MIN PRN 5/22/2019     Sig: Inject 0.25-1 mLs (0.1-0.4 mg) into the vein every 2 minutes as needed for opioid reversal    Class: E-Prescribe    Route: Intravenous    ondansetron (ZOFRAN) injection 4 mg 4 mg ONCE 5/22/2019     Sig: Inject 2 " "mLs (4 mg) into the vein once    Class: E-Prescribe    Route: Intravenous    ondansetron (ZOFRAN) injection 4 mg 4 mg EVERY 6 HOURS PRN 5/22/2019     Sig: Inject 2 mLs (4 mg) into the vein every 6 hours as needed for nausea or vomiting    Class: E-Prescribe    Route: Intravenous    Linked Group 2:  \"Or\" Linked Group Details        ondansetron (ZOFRAN-ODT) ODT tab 4 mg 4 mg EVERY 6 HOURS PRN 5/22/2019     Sig: Take 1 tablet (4 mg) by mouth every 6 hours as needed for nausea or vomiting    Class: E-Prescribe    Route: Oral    Linked Group 2:  \"Or\" Linked Group Details        oxyCODONE (ROXICODONE) tablet 5-10 mg 5-10 mg EVERY 4 HOURS PRN 5/23/2019     Sig: Take 1-2 tablets (5-10 mg) by mouth every 4 hours as needed for severe pain (Start with 5mg)    Route: Oral    polyethylene glycol (MIRALAX/GLYCOLAX) Packet 17 g 17 g DAILY 5/23/2019 5/25/2019    Sig: Take 17 g by mouth daily    Class: E-Prescribe    Route: Oral    prochlorperazine (COMPAZINE) injection 10 mg 10 mg EVERY 6 HOURS PRN 5/22/2019     Sig: Inject 2 mLs (10 mg) into the vein every 6 hours as needed for nausea or vomiting    Class: E-Prescribe    Route: Intravenous    Linked Group 3:  \"Or\" Linked Group Details        prochlorperazine (COMPAZINE) tablet 10 mg 10 mg EVERY 6 HOURS PRN 5/22/2019     Sig: Take 2 tablets (10 mg) by mouth every 6 hours as needed for nausea or vomiting    Class: E-Prescribe    Route: Oral    Linked Group 3:  \"Or\" Linked Group Details        ranitidine (ZANTAC) tablet 150 mg 150 mg 2 TIMES DAILY 5/22/2019     Sig: Take 1 tablet (150 mg) by mouth 2 times daily    Class: E-Prescribe    Route: Oral    senna-docusate (SENOKOT-S/PERICOLACE) 8.6-50 MG per tablet 1 tablet 1 tablet 2 TIMES DAILY 5/22/2019     Sig: Take 1 tablet by mouth 2 times daily    Class: E-Prescribe    Route: Oral    Linked Group 4:  \"Or\" Linked Group Details        senna-docusate (SENOKOT-S/PERICOLACE) 8.6-50 MG per tablet 2 tablet 2 tablet 2 TIMES DAILY 5/22/2019  " "   Sig: Take 2 tablets by mouth 2 times daily    Class: E-Prescribe    Route: Oral    Linked Group 4:  \"Or\" Linked Group Details              Labs:  Lab Results   Component Value Date    CR 1.28 (H) 05/23/2019    CR 0.80 05/21/2019    CR 0.78 03/09/2018     Lab Results   Component Value Date    HGB 10.4 (L) 05/23/2019    HGB 11.9 05/22/2019    HGB 13.1 05/21/2019       Assessment: Post-operative day #1, doing fairly well.    Plan:   -Encouraged ambulation and ISB   -Continue GI and DVT prophylaxis. Suppository today.  -Pain control: Scheduled acetaminophen, toradol. Encourage oral analgesia prn.  -Remove borges  -Discontinue IV fluid    Discharge planning: Discharge today vs tomorrow when goal are met.    Alanna Ibanez NP  Division of Transplantation  Pager [phone]  Attestation:  Patient has been seen and evaluated by me.   Vital signs, labs, medications and orders were reviewed.   When obtained, diagnostic images were reviewed by me and interpreted as above.    The care plan was discussed with the multidisciplinary team and I agree with the findings and plan in this note, with any differences recorded in blue.    .    "

## 2019-05-23 NOTE — PLAN OF CARE
VS: Afebrile, BP 's/50's, HR 50-70's, O2 sat 96-98% on 2L/NC. Capno, EtCO2 35-43, IPI 8-10.  LDA: Right and left PIV with D5LR@60cc/hr and saline locked.  Neuro: A&Ox4.  GI/: Euceda catheter with good urine output, no BM, not passing gas.  Diet/appetite: Regular diet, fair PO intake.  Activity: Encourage ambulation 4 times a day.  Pain/Nausea/Vomiting: Oxycodone and Tylenol given for incisional pain with relief, denies nausea.  Skin: Surgical incision with liquid bandage d/I.  Mobility: SBA.  Test/Procedure: None.  Plan: Continue plan of care.

## 2019-05-23 NOTE — PHARMACY-ADMISSION MEDICATION HISTORY
Admission medication history interview status for the [unfilled] admission is complete. See Epic admission navigator for allergy information, pharmacy, prior to admission medications and immunization status.     Medication history interview sources: Patient, Care Everywhere ([facility])    Changes made to PTA medication list (reason)  Added:   Magnesium oxide 250 mg (per patient and [facility])  Calcium-Vitamin D (per patient)   Fish Oil (per patient and [facility])  Deleted: N/A  Changed:   Levothyroxine/Synthroid 112 mcg tablet: take 1 tablet by mouth daily --> Synthroid 125 mcg tablet: take 1 tablet by mouth daily     Additional medication history information (including reliability of information, actions taken by pharmacist):  -Patient was a good historian of her prescription medications but did not know the dosages of the over-the-counter medications  -Patient states that she is on the BRAND name Synthroid and that the generic did not work well for her in past   -Patient states that a bottle of alprazolam normally lasts a whole year   -Preferred pharmacy: [facility]      Prior to Admission medications    Medication Sig Last Dose Taking? Auth Provider   ALPRAZolam (XANAX) 0.5 MG tablet TAKE 1 TABLET BY MOUTH UP THREE TIMES DAILY IN 24 HOURS AS NEEDED FOR ANXIETY 5/21/2019 at Unknown time Yes Reported, Patient   CALCIUM-VITAMIN D PO Take 1 tablet by mouth daily Past Week at Unknown time Yes Unknown, Entered By History   levothyroxine (SYNTHROID) 125 MCG tablet Take 125 mcg by mouth daily 5/21/2019 at 10PM Yes Unknown, Entered By History   Magnesium Oxide 250 MG TABS Take 1 tablet by mouth daily Past Month at Unknown time Yes Unknown, Entered By History   melatonin 5 MG tablet Take 5 mg by mouth nightly as needed for sleep 5/19/2019 Yes Reported, Patient   Omega-3 Fatty Acids (FISH OIL PO) Take 1 capsule by mouth daily 5/17/2019 Yes Unknown, Entered By History         Medication history  completed by: CARMEN Couch Pharmacy Intern

## 2019-05-23 NOTE — PHARMACY-TRANSPLANT NOTE
[facility] Prior to Discharge Note  34 year old female s/p kidney donation surgery on 5/22/2019.     Pharmacy has monitored for any potential medication issues.  In anticipation for discharge, medication therapy needs have been addressed daily throughout the current admission via multidisciplinary rounds and/or discussions, order verification, daily clinical pharmacy review, and communication with prescribers.     Omar Mane RPH on 5/23/2019 at 12:39 PM

## 2019-05-24 ENCOUNTER — DOCUMENTATION ONLY (OUTPATIENT)
Dept: TRANSPLANT | Facility: CLINIC | Age: 34
End: 2019-05-24

## 2019-05-24 VITALS
BODY MASS INDEX: 33.85 KG/M2 | TEMPERATURE: 97.9 F | RESPIRATION RATE: 16 BRPM | DIASTOLIC BLOOD PRESSURE: 57 MMHG | HEIGHT: 65 IN | HEART RATE: 64 BPM | SYSTOLIC BLOOD PRESSURE: 110 MMHG | OXYGEN SATURATION: 98 % | WEIGHT: 203.2 LBS

## 2019-05-24 LAB — MISCELLANEOUS TEST: NORMAL

## 2019-05-24 PROCEDURE — 25000132 ZZH RX MED GY IP 250 OP 250 PS 637: Performed by: STUDENT IN AN ORGANIZED HEALTH CARE EDUCATION/TRAINING PROGRAM

## 2019-05-24 PROCEDURE — 25000128 H RX IP 250 OP 636: Performed by: TRANSPLANT SURGERY

## 2019-05-24 PROCEDURE — 25000131 ZZH RX MED GY IP 250 OP 636 PS 637: Performed by: STUDENT IN AN ORGANIZED HEALTH CARE EDUCATION/TRAINING PROGRAM

## 2019-05-24 PROCEDURE — 25000132 ZZH RX MED GY IP 250 OP 250 PS 637: Performed by: TRANSPLANT SURGERY

## 2019-05-24 PROCEDURE — 25000132 ZZH RX MED GY IP 250 OP 250 PS 637: Performed by: NURSE PRACTITIONER

## 2019-05-24 RX ORDER — ONDANSETRON 4 MG/1
4 TABLET, ORALLY DISINTEGRATING ORAL EVERY 6 HOURS PRN
Qty: 20 TABLET | Refills: 0 | Status: SHIPPED | OUTPATIENT
Start: 2019-05-24

## 2019-05-24 RX ORDER — OXYCODONE HYDROCHLORIDE 5 MG/1
5-10 TABLET ORAL EVERY 4 HOURS PRN
Qty: 20 TABLET | Refills: 0 | Status: SHIPPED | OUTPATIENT
Start: 2019-05-24

## 2019-05-24 RX ORDER — AMOXICILLIN 250 MG
1 CAPSULE ORAL 2 TIMES DAILY
Qty: 60 TABLET | Refills: 0 | Status: SHIPPED | OUTPATIENT
Start: 2019-05-24

## 2019-05-24 RX ORDER — ACETAMINOPHEN 325 MG/1
975 TABLET ORAL EVERY 8 HOURS PRN
Qty: 1 BOTTLE | Refills: 0 | Status: SHIPPED | OUTPATIENT
Start: 2019-05-24

## 2019-05-24 RX ADMIN — ONDANSETRON 4 MG: 4 TABLET, ORALLY DISINTEGRATING ORAL at 08:18

## 2019-05-24 RX ADMIN — OXYCODONE HYDROCHLORIDE 10 MG: 5 TABLET ORAL at 12:33

## 2019-05-24 RX ADMIN — POLYETHYLENE GLYCOL 3350 17 G: 17 POWDER, FOR SOLUTION ORAL at 08:11

## 2019-05-24 RX ADMIN — LEVOTHYROXINE SODIUM 125 MCG: 125 TABLET ORAL at 08:11

## 2019-05-24 RX ADMIN — MAGNESIUM HYDROXIDE 30 ML: 400 SUSPENSION ORAL at 08:11

## 2019-05-24 RX ADMIN — RANITIDINE 150 MG: 150 TABLET, FILM COATED ORAL at 08:11

## 2019-05-24 RX ADMIN — KETOROLAC TROMETHAMINE 15 MG: 30 INJECTION, SOLUTION INTRAMUSCULAR at 04:21

## 2019-05-24 RX ADMIN — OXYCODONE HYDROCHLORIDE 10 MG: 5 TABLET ORAL at 08:17

## 2019-05-24 RX ADMIN — SENNOSIDES AND DOCUSATE SODIUM 2 TABLET: 8.6; 5 TABLET ORAL at 08:11

## 2019-05-24 RX ADMIN — ACETAMINOPHEN 975 MG: 325 TABLET, FILM COATED ORAL at 04:21

## 2019-05-24 RX ADMIN — ACETAMINOPHEN 975 MG: 325 TABLET, FILM COATED ORAL at 12:35

## 2019-05-24 ASSESSMENT — ACTIVITIES OF DAILY LIVING (ADL)
ADLS_ACUITY_SCORE: 17

## 2019-05-24 ASSESSMENT — PAIN DESCRIPTION - DESCRIPTORS: DESCRIPTORS: ACHING

## 2019-05-24 NOTE — DISCHARGE SUMMARY
[facility], [place]    Discharge Summary  Solid Organ Transplant Surgery    Date of Admission:  5/22/2019  Date of Discharge:  5/24/2019  Date of Service (when I saw the patient): 05/24/19    Discharge Diagnoses   Active Problems:    Encounter for donation of kidney      Procedure/Surgery Information   Procedure: Procedure(s):  Laparoscopic Hand Assisted  Unrelated Nondirected Kidney Donor   Surgeon(s): Surgeon(s) and Role:     * Alfa Elizalde MD - Primary     * Peter Collins MD - Assisting     * Rena Jaquez MD - Resident - Assisting             History of Present Illness   Elle Hill is a 34 year old female who presented for nephrectomy for kidney donation.    Hospital Course   Elle Hill is a 34 year old female with PMH significant for hypothyroidism and anxiety. She was admitted on 5/22/2019 following nephrectomy for kidney donation. On POD #2 she was tolerating regular diet, had return of bowel function, ambulating independently, pain adequately controlled on oral regimen of oxycodone and tylenol and therefore felt approriate for discharge home.     Post-operative pain control: included Hydromorphone (dilaudid) IV, Toradol, and Tylenol, and oxycodone. He will be discharged on Oxycodone and Tylenol alone on discharge.  The patient was instructed to avoid NSAID medications.    Discharge Disposition   Discharged to home   Condition at discharge: Stable    Primary Care Physician   Taylor Escobedo    Physical Exam   Temp: 97.9  F (36.6  C) Temp src: Oral BP: 110/57 Pulse: 64 Heart Rate: 70 Resp: 16 SpO2: 98 % O2 Device: None (Room air)    Vitals:    05/22/19 0532 05/23/19 1004   Weight: 90.9 kg (200 lb 6.4 oz) 92.2 kg (203 lb 3.2 oz)     Vital Signs with Ranges  Temp:  [97.9  F (36.6  C)-98.7  F (37.1  C)] 97.9  F (36.6  C)  Pulse:  [64] 64  Heart Rate:  [60-78] 70  Resp:  [14-16] 16  BP: ()/(54-68) 110/57  SpO2:  [95 %-98 %] 98 %  I/O last 3 completed  shifts:  In: 1260 [P.O.:720; I.V.:540]  Out: 2500 [Urine:2500]      Gen: A&O x3, NAD  Chest: breathing non-labored on room air, CTA, IS 1250  Abdomen: soft, slightly diestended, appropriately tender  Incision: clean, dry, intact with dermabond  Extremities: warm and well perfused, no edema           Consultations This Hospital Stay   PHARMACY IP CONSULT  SOT MEDICATION HISTORY IP PHARMACY CONSULT  MEDICATION HISTORY IP PHARMACY CONSULT    Time Spent on this Encounter   I have spent greater than 30 minutes on this discharge.    Discharge Orders      Reason for your hospital stay    Kidney donation surgery     Adult [facility] Follow-up and recommended labs and tests    Follow up with Dr. Elizalde in Transplant Clinic in 2-3 weeks.  If you need to change your appointment please contact your coordinator at [phone].    Appointments on [place] and/or [facility] (with [facility] or [facility] provider or service). Call [phone] if you haven't heard regarding these appointments within 7 days of discharge.     Activity    Don't lift anything heavier than 10 pounds for 6 weeks (or longer if your surgeon has discussed this with you)..   Walk regularly.    OK to drive only after no longer taking narcotics AND you feel comfortable working the breaks/clutch suddenly if needed.  Limit sports and strenuous activities/'core' exercises for 6 weeks.  If you experience pain after exertion, try an ice pack or warm pack to the area.   Wear abdominal binder for comfort if you desire, but only when out of bed.    You have been instructed to avoid NSAIDs (ibuprofen/aspirin like medications) as these medications may affect the remaining kidney. Take other medications as prescribed.    Keep narcotics out of reach of children. When finished with them, please destroy/discard any remaining pills responsibly. Often, your county government office, local police station or pharmacy will have a drug deposit box.     When to contact your care team     Your transplant coordinator if you have any of the following:   Swelling, oozing, worsening pain, unusual redness around the incision, or if:  Fever of 101.5 F or higher   Increasing abdominal pain   Nausea or vomiting   Severe diarrhea, bloating, or constipation     Any concerns or questions, please call your Transplant coordinator:    Phone: [phone].  If they are not available, the on call coordinator/MD will help you with your concern.     Full Code     Diet    Keep yourself well hydrated (goal 1500 ml/day).   Eat lightly the first week as tolerated and avoid constipating foods.  If you are constipated, take a stool softener like Senna/Colace/Dulcolax/Miralax.     Discharge Medications   Current Discharge Medication List      START taking these medications    Details   acetaminophen (TYLENOL) 325 MG tablet Take 3 tablets (975 mg) by mouth every 8 hours as needed for mild pain  Qty: 1 Bottle, Refills: 0    Associated Diagnoses: Encounter for donation of kidney      ondansetron (ZOFRAN-ODT) 4 MG ODT tab Take 1 tablet (4 mg) by mouth every 6 hours as needed for nausea or vomiting  Qty: 20 tablet, Refills: 0    Associated Diagnoses: Encounter for donation of kidney      oxyCODONE (ROXICODONE) 5 MG tablet Take 1-2 tablets (5-10 mg) by mouth every 4 hours as needed for severe pain (Start with 5mg)  Qty: 20 tablet, Refills: 0    Associated Diagnoses: Encounter for donation of kidney      senna-docusate (SENOKOT-S/PERICOLACE) 8.6-50 MG tablet Take 1 tablet by mouth 2 times daily  Qty: 60 tablet, Refills: 0    Associated Diagnoses: Encounter for donation of kidney         CONTINUE these medications which have NOT CHANGED    Details   ALPRAZolam (XANAX) 0.5 MG tablet TAKE 1 TABLET BY MOUTH UP THREE TIMES DAILY IN 24 HOURS AS NEEDED FOR ANXIETY      CALCIUM-VITAMIN D PO Take 1 tablet by mouth daily      levothyroxine (SYNTHROID) 125 MCG tablet Take 125 mcg by mouth daily      Magnesium Oxide 250 MG TABS Take 1  tablet by mouth daily      melatonin 5 MG tablet Take 5 mg by mouth nightly as needed for sleep      Omega-3 Fatty Acids (FISH OIL PO) Take 1 capsule by mouth daily           Allergies   No Known Allergies  Data   Results for orders placed or performed during the hospital encounter of 05/22/19   POC US GUIDANCE NEEDLE PLACEMENT    Impression    Bilateral TAP blocks     Most Recent 3 CBC's:  Recent Labs   Lab Test 05/23/19  0644 05/22/19  1138 05/21/19  1025 03/09/18  0655   WBC  --   --  6.2 6.2   HGB 10.4* 11.9 13.1 12.7   MCV  --   --  96 96   PLT  --   --  241 247     Most Recent 3 BMP's:  Recent Labs   Lab Test 05/23/19  0644 05/21/19  1025 03/09/18  0655   NA  --   --  137   POTASSIUM  --   --  4.0   CHLORIDE  --   --  103   CO2  --   --  27   BUN 14  --  15   CR 1.28* 0.80 0.78   ANIONGAP  --   --  6   RENEE  --   --  8.6   GLC  --   --  82     Most Recent 3 Hemoglobins:  Recent Labs   Lab Test 05/23/19  0644 05/22/19  1138 05/21/19  1025   HGB 10.4* 11.9 13.1     Agree with Ms Dooley.  Ready to go after kidney donation.  Doing well.   Will see back in clinic.  I saw patient and participated in care

## 2019-05-24 NOTE — PLAN OF CARE
"/57 (BP Location: Right arm)   Pulse 64   Temp 97.9  F (36.6  C) (Oral)   Resp 16   Ht 1.651 m (5' 5\")   Wt 92.2 kg (203 lb 3.2 oz)   LMP 05/21/2019   SpO2 98%   BMI 33.81 kg/m      Patient discharged from hospital with spouse. Discharge instructions reviewed with patient and all questions answered. Prescription medications to be picked up at [facility] [facility]. AVSS on room air. Pain well controlled and patient denies nausea. PIV x2 removed. All belongings accounted for.   "

## 2019-05-24 NOTE — PLAN OF CARE
[id number]  AVSS; neuro intact; reports well controlled incisional pain; denies nausea; + flatus; pt had 1 small BM earlier in the evening. Voids spont without difficulty.  2 PIV saline locked. Incisions intact with liquid bandage and DANIKA. Continue to monitor and with POC, update team with changes/concerns.

## 2019-05-24 NOTE — PLAN OF CARE
VSS. No pain, nausea or vomiting overnight. No prn given. Regular diet. x2 PIV SL. Incision dermabonded healing well. Urinates well. No BM overnight. Up ad guerita.

## 2019-05-24 NOTE — PROGRESS NOTES
I visited the patient in the 7A room.  The patient was laying in bed awake.  The patient reports good pain control today and denies nausea.    The patient had borges catheter removed and has urinated without difficulty.  The patient reports going for walks.    The patient  has passed gas and has had 4 small bowel movements.  The patient is able to drink liquid and has eaten small amounts of food.  Discharge plan: today.  She will stay locally until June 5.    I gave the patient the education sheet of the milestones for discharge and things to expect after donation.  Organ specific education completed, and discharge planning has been conducted with multidisciplinary transplant care team including physicians, pharmacy, nutrition, and social work.  She asked how the recipient was doing at [facility].  I had received an update from that team, and told her that the recipient is doing well and that the kidney she donated is functioning.  She was happy to hear this news. She stated she plans to write a letter to the recipient sometime when she feels stronger.  I gave her my business card and asked her to send any correspondence to me and I will route it to the recipient.  We reviewed the contact phone numbers for after hours care.  I will plan to check on her on Tuesday.  She will need to see Dr Elizalde on Thursday May 30 in clinic.  I will check with his  to get it scheduled.

## 2019-05-24 NOTE — PROGRESS NOTES
LIVING DONOR SOCIAL WORK AND INDEPENDENT LIVING DONOR ADVOCATE NOTE:  D:  Elle Hill is a living kidney donor, POD 2.  I:  I met with her at bedside to thank her for kidney donation (paired exchange) and to assess for any psychosocial needs and to assist with discharge planning.  I assessed the patient's mood/affect, plans for recovery, and any feelings of regret/remorse regarding donation.   A:  Pt is resting comfortably in bed on the unit.  She appears to be in a good mood and affect is congruent.  She states that pain is well controlled but admits is it more painful than she had expected.  She reports that her stepson/ recipient is doing extremely well so far, and she/he has been able to see and talk with him via [employer].  Patient describes donation recovery as going well* so far.  She and I discussed how to reach me should she have any post operative psychosocial needs.  She reports no feelings of regret or remorse about donation.  She denies any discharge planning needs at this time.  Patient plans to discharge to [facility] locally for a couple of weeks,  with the care and support of her family.   P: Donor /MICHELLE will remain available to assist with any psychosocial and advocacy needs, both inpatient and outpatient, as needed.  Patient is aware of follow-up recommendations and has my contact information.

## 2019-05-24 NOTE — PROGRESS NOTES
Visited Elle on 7A post donation.  She was sleeping.  Will follow up on Friday.    Organ specific education completed, and discharge planning has been conducted with multidisciplinary transplant care team including physicians, pharmacy, nutrition, and social work.

## 2019-05-29 ENCOUNTER — TELEPHONE (OUTPATIENT)
Dept: TRANSPLANT | Facility: CLINIC | Age: 34
End: 2019-05-29

## 2019-05-29 ENCOUNTER — OFFICE VISIT (OUTPATIENT)
Dept: TRANSPLANT | Facility: CLINIC | Age: 34
End: 2019-05-29
Attending: TRANSPLANT SURGERY

## 2019-05-29 VITALS
DIASTOLIC BLOOD PRESSURE: 69 MMHG | BODY MASS INDEX: 32.2 KG/M2 | HEART RATE: 68 BPM | SYSTOLIC BLOOD PRESSURE: 101 MMHG | OXYGEN SATURATION: 99 % | WEIGHT: 193.5 LBS

## 2019-05-29 DIAGNOSIS — Z52.4 ENCOUNTER FOR DONATION OF KIDNEY: ICD-10-CM

## 2019-05-29 DIAGNOSIS — Z48.89 POSTOPERATIVE VISIT: Primary | ICD-10-CM

## 2019-05-29 ASSESSMENT — PAIN SCALES - GENERAL: PAINLEVEL: MILD PAIN (2)

## 2019-05-29 NOTE — LETTER
5/29/2019       RE: Elle Hill  [address] [place] [address]     Dear Colleague,    Thank you for referring your patient, Elle Hill, to the [facility] SOLID ORGAN TRANSPLANT at [facility]. Please see a copy of my visit note below.    Kidney donation 5/22  No problems since discharge.  Off narc, no prob with bowels, eating and moving around ok.    /69   Pulse 68   Wt 87.8 kg (193 lb 8 oz)   LMP 05/21/2019   SpO2 99%   BMI 32.20 kg/m     Incision and port sites healing well.  abd soft and nontender  Ext no edema    Imp: doing ok after kidney donation  [place] home ([place]  Come back for follow up per donation office.    Again, thank you for allowing me to participate in the care of your patient.      Sincerely,    Alfa Elizalde MD

## 2019-06-07 ENCOUNTER — TELEPHONE (OUTPATIENT)
Dept: TRANSPLANT | Facility: CLINIC | Age: 34
End: 2019-06-07

## 2019-06-07 NOTE — PROGRESS NOTES
Kidney donation 5/22  No problems since discharge.  Off narc, no prob with bowels, eating and moving around ok.    /69   Pulse 68   Wt 87.8 kg (193 lb 8 oz)   LMP 05/21/2019   SpO2 99%   BMI 32.20 kg/m    Incision and port sites healing well.  abd soft and nontender  Ext no edema    Imp: doing ok after kidney donation  [place] ([place]  Come back for follow up per donation office.

## 2019-06-07 NOTE — TELEPHONE ENCOUNTER
I called the patient to check on her recovery progress.  She reports doing well.  No concerns at this time.  She said she is resting a lot and reading.  She is back at home now.  She said her trip back she was glad to break it apart and drive it in two days.  We reviewed that she will see Dr Elizalde on 7/25 as she will be close to the [facility].  We reviewed that labs are due at 6 weeks which she can do locally.    I encouraged her to call with any issues.  She thanked me for the call.

## 2019-06-18 ENCOUNTER — TELEPHONE (OUTPATIENT)
Dept: TRANSPLANT | Facility: CLINIC | Age: 34
End: 2019-06-18

## 2019-06-18 NOTE — TELEPHONE ENCOUNTER
I called the patient to check on her recovery progress.  She reports an intermittent pain of her middle left back.  She says it does seem to be worse after periods of activity.  She is taking tylenol she said with moderate relief.    She has returned to work and her clothing seem to irritate her wound area, the pant waste band.  So the side ports are more painful this week.    Otherwise she reports doing well with diet and fluid intake and she denies any issue with urinating or having bowel movements.    I encouraged her to call if she has any increased pain.  Or if she has any other types of questions.  She thanked me for the call.

## 2019-07-19 DIAGNOSIS — Z52.4 KIDNEY DONOR: Primary | ICD-10-CM

## 2019-07-25 ENCOUNTER — OFFICE VISIT (OUTPATIENT)
Dept: TRANSPLANT | Facility: CLINIC | Age: 34
End: 2019-07-25
Attending: TRANSPLANT SURGERY
Payer: COMMERCIAL

## 2019-07-25 VITALS
BODY MASS INDEX: 32.84 KG/M2 | SYSTOLIC BLOOD PRESSURE: 110 MMHG | DIASTOLIC BLOOD PRESSURE: 75 MMHG | HEIGHT: 65 IN | HEART RATE: 82 BPM | WEIGHT: 197.1 LBS

## 2019-07-25 DIAGNOSIS — Z52.4 KIDNEY DONOR: ICD-10-CM

## 2019-07-25 DIAGNOSIS — Z52.4 KIDNEY DONOR: Primary | ICD-10-CM

## 2019-07-25 LAB
ALBUMIN UR-MCNC: NEGATIVE MG/DL
APPEARANCE UR: CLEAR
BACTERIA #/AREA URNS HPF: ABNORMAL /HPF
BILIRUB UR QL STRIP: NEGATIVE
COLOR UR AUTO: ABNORMAL
CREAT SERPL-MCNC: 1.2 MG/DL (ref 0.52–1.04)
CREAT UR-MCNC: 22 MG/DL
GFR SERPL CREATININE-BSD FRML MDRD: 59 ML/MIN/{1.73_M2}
GLUCOSE UR STRIP-MCNC: NEGATIVE MG/DL
HGB UR QL STRIP: NEGATIVE
KETONES UR STRIP-MCNC: NEGATIVE MG/DL
LEUKOCYTE ESTERASE UR QL STRIP: NEGATIVE
MICROALBUMIN UR-MCNC: <5 MG/L
MICROALBUMIN/CREAT UR: NORMAL MG/G CR (ref 0–25)
MUCOUS THREADS #/AREA URNS LPF: PRESENT /LPF
NITRATE UR QL: NEGATIVE
PH UR STRIP: 7 PH (ref 5–7)
PROT UR-MCNC: <0.05 G/L
PROT/CREAT 24H UR: NORMAL G/G CR (ref 0–0.2)
RBC #/AREA URNS AUTO: <1 /HPF (ref 0–2)
SOURCE: ABNORMAL
SP GR UR STRIP: 1 (ref 1–1.03)
UROBILINOGEN UR STRIP-MCNC: 0 MG/DL (ref 0–2)
WBC #/AREA URNS AUTO: <1 /HPF (ref 0–5)

## 2019-07-25 PROCEDURE — 82565 ASSAY OF CREATININE: CPT | Performed by: TRANSPLANT SURGERY

## 2019-07-25 PROCEDURE — 84156 ASSAY OF PROTEIN URINE: CPT | Performed by: TRANSPLANT SURGERY

## 2019-07-25 PROCEDURE — 81001 URINALYSIS AUTO W/SCOPE: CPT | Performed by: TRANSPLANT SURGERY

## 2019-07-25 PROCEDURE — 36415 COLL VENOUS BLD VENIPUNCTURE: CPT | Performed by: TRANSPLANT SURGERY

## 2019-07-25 PROCEDURE — G0463 HOSPITAL OUTPT CLINIC VISIT: HCPCS | Mod: ZF

## 2019-07-25 PROCEDURE — 82043 UR ALBUMIN QUANTITATIVE: CPT | Performed by: TRANSPLANT SURGERY

## 2019-07-25 ASSESSMENT — PAIN SCALES - GENERAL: PAINLEVEL: NO PAIN (0)

## 2019-07-25 ASSESSMENT — MIFFLIN-ST. JEOR: SCORE: 1599.92

## 2019-07-25 NOTE — LETTER
"7/25/2019      RE: Elle Hill  [address] [place] [address]       Sp kidney donation 5/22/19    Doing well.   No infx, fever etc.  Appetite and activity back to normal  No UTI    /75   Pulse 82   Ht 1.651 m (5' 5\")   Wt 89.4 kg (197 lb 1.6 oz)   BMI 32.80 kg/m     Abd soft, non tender  Incisions fine.  Healing well.    Answered all questions.  RTC and phone call per  Protocol  Stable post kidney donation.    Alfa Elizalde MD      "

## 2019-07-25 NOTE — NURSING NOTE
"Chief Complaint   Patient presents with     Surgical Followup     donor follow up     Blood pressure 110/75, pulse 82, height 1.651 m (5' 5\"), weight 89.4 kg (197 lb 1.6 oz).    Marita Johnson CMA on 7/25/2019 at 12:20 PM    "

## 2019-07-25 NOTE — PROGRESS NOTES
"Sp kidney donation 5/22/19    Doing well.   No infx, fever etc.  Appetite and activity back to normal  No UTI    /75   Pulse 82   Ht 1.651 m (5' 5\")   Wt 89.4 kg (197 lb 1.6 oz)   BMI 32.80 kg/m    Abd soft, non tender  Incisions fine.  Healing well.    Answered all questions.  RTC and phone call per  Protocol  Stable post kidney donation.  "

## 2019-07-25 NOTE — LETTER
"7/25/2019       RE: Elle Hill  [address] [address]     Dear Colleague,    Thank you for referring your patient, Elle Hill, to the [facility] SOLID ORGAN TRANSPLANT at [facility]. Please see a copy of my visit note below.    Sp kidney donation 5/22/19    Doing well.   No infx, fever etc.  Appetite and activity back to normal  No UTI    /75   Pulse 82   Ht 1.651 m (5' 5\")   Wt 89.4 kg (197 lb 1.6 oz)   BMI 32.80 kg/m     Abd soft, non tender  Incisions fine.  Healing well.    Answered all questions.  RTC and phone call per  Protocol  Stable post kidney donation.    Again, thank you for allowing me to participate in the care of your patient.      Sincerely,    Alfa Elizalde MD      "

## 2019-10-27 NOTE — TELEPHONE ENCOUNTER
I have called the patient to review that the last labs drawn for surgery are completed and all is looking good.  I reviewed her travel and accommodations plan.  She will need to see Dr Elizalde earlier than the existing appointment post surgery.  They plan to stay in the [facility] until Jun 5.  I will check with the Surgeons admin to see if she can be seen on May 30.  They have an apartment/hotel in [place] setup as well as an air b&b.  I will plan to see her in clinic on Tuesday.  
Declined

## 2019-10-31 ENCOUNTER — OFFICE VISIT (OUTPATIENT)
Dept: INTERNAL MEDICINE | Facility: CLINIC | Age: 34
End: 2019-10-31
Payer: COMMERCIAL

## 2019-10-31 VITALS
SYSTOLIC BLOOD PRESSURE: 110 MMHG | WEIGHT: 201 LBS | OXYGEN SATURATION: 98 % | DIASTOLIC BLOOD PRESSURE: 76 MMHG | TEMPERATURE: 98.3 F | HEART RATE: 69 BPM | BODY MASS INDEX: 33.45 KG/M2

## 2019-10-31 DIAGNOSIS — F32.89 OTHER DEPRESSION: ICD-10-CM

## 2019-10-31 DIAGNOSIS — F43.0 ANXIETY AS ACUTE REACTION TO EXCEPTIONAL STRESS: ICD-10-CM

## 2019-10-31 DIAGNOSIS — F41.1 ANXIETY AS ACUTE REACTION TO EXCEPTIONAL STRESS: ICD-10-CM

## 2019-10-31 DIAGNOSIS — F41.9 ANXIETY: Primary | ICD-10-CM

## 2019-10-31 PROBLEM — F32.A DEPRESSION: Status: ACTIVE | Noted: 2019-04-02

## 2019-10-31 PROCEDURE — 99213 OFFICE O/P EST LOW 20 MIN: CPT | Performed by: NURSE PRACTITIONER

## 2019-10-31 RX ORDER — BUPROPION HYDROCHLORIDE 300 MG/1
300 TABLET ORAL DAILY
Qty: 90 TABLET | Refills: 3 | Status: SHIPPED | OUTPATIENT
Start: 2019-10-31 | End: 2020-10-12

## 2019-10-31 ASSESSMENT — PAIN SCALES - GENERAL: PAINLEVEL: 0-NO PAIN

## 2019-10-31 NOTE — PROGRESS NOTES
Subjective      Patient ID: Sierra Kelley is a 34 y.o. female.    HPI  Patient presents clinic today for anxiety and depression.  Patient would like to re-start her Wellbutrin.  She recently donated a kidney.  She states that she is just not recovering as she anticipated.  She does come to find she is very lethargic and unable to be motivated.  Patient has good insight and will restart that today.  Patient also needs intermittent alprazolam for anxiety related to work and travel situations.  Patient denies fever chills, chest pain, palpitations, shortness of breath, abdominal pain or edema.          ROS  No headaches, vision changes, or hearing changes.  No URI symptoms.  No dysphagia or odynophagia.  No shortness of breath, coughing, wheezing or PND.  No chest pain, palpitations, or anginal symptoms.  No nausea/ vomiting, heartburn or abdominal discomfort.  No urinary or bowel changes.  Review of systems otherwise is negative.    Comprehensive Medical and Social History  Patient Active Problem List   Diagnosis   • Other specified hypothyroidism   • Depression   • Anxiety     Past Medical History:   Diagnosis Date   • Anxiety    • Chronic headache    • Depression    • Hypothyroid      Past Surgical History:   Procedure Laterality Date   • THYROIDECTOMY       No Known Allergies  Current Outpatient Medications   Medication Sig Dispense Refill   • multivitamin with minerals tablet Take 1 tablet by mouth daily     • levothyroxine (SYNTHROID) 125 mcg tablet Take 1 tablet (125 mcg total) by mouth daily 30 tablet 11   • omega-3 fatty acids-fish oil (FISH OIL) 340-1,000 mg capsule Take 1 tab/cap by mouth daily.     • calcium carbonate-vitamin D3 600 mg(1,500mg) -400 unit per tablet Take 1 tablet by mouth daily.       No current facility-administered medications for this visit.      Social History     Occupational History   • Occupation:    Tobacco Use   • Smoking status: Never Smoker   • Smokeless  tobacco: Never Used   Substance and Sexual Activity   • Alcohol use: Yes   • Drug use: No   • Sexual activity: Defer   Social History Narrative   • Not on file       Physical Exam  /76   Pulse 69   Temp 36.8 °C (98.3 °F)   Wt 91.2 kg (201 lb)   SpO2 98%   BMI 33.45 kg/m²     GENERAL: Patient is alert and oriented, in no acute distress.  HEENT: Normocephalic, atraumatic. EOMI, PERRLA, sclerae anicteric.  Oropharynx within normal limits.  No tenderness to palpation of sinuses.    NECK: No cervical lymphadenopathy.  No carotid bruits, thyromegaly, or supraclavicular lymph nodes appreciated.  Trachea is midline.  LUNGS:  Clear to auscultation bilaterally, both anterior and posterior.  CARDIOVASCULAR EXAM: Regular rate and rhythm without murmur, rub, or gallop.    ABDOMEN:  Soft. Positive bowel sounds in all four quadrants.  No rebound. No guarding.  No hepatosplenomegaly or renal bruits appreciated.  Nontender, nondistended.  EXTREMITIES:  No clubbing, cyanosis, or edema.    Assessment/Plan     Diagnoses and all orders for this visit:    Anxiety  -     buPROPion XL (Wellbutrin XL) 300 mg 24 hr tablet; Take 1 tablet (300 mg total) by mouth daily    Other depression  -     buPROPion XL (Wellbutrin XL) 300 mg 24 hr tablet; Take 1 tablet (300 mg total) by mouth daily        Patient will start on Wellbutrin at 300 mg tablet p.o. daily.  I did recommend that she start at 150 mg p.o. daily by cutting tablet in half for 1 week.  Patient did verbalize understanding.  Patient will use alprazolam on as-needed basis.  Patient will follow-up at her well on exam and physical or on as-needed basis.      YANELI JOE CNP  8:34 AM, 10/31/2019.        A voice recognition program was used to aid in documentation of this record. Sometimes words are not presented exactly as they were spoken.  While efforts were made to carefully edit and correct any inaccuracies, some errors may be present.  Please take this into context.   Please contact the provider if errors are identified.

## 2019-11-01 RX ORDER — ALPRAZOLAM 0.25 MG/1
TABLET ORAL
Qty: 30 TABLET | Refills: 0 | Status: SHIPPED | OUTPATIENT
Start: 2019-11-01 | End: 2020-06-08

## 2019-11-13 ENCOUNTER — IMMUNIZATION (OUTPATIENT)
Dept: FAMILY MEDICINE | Facility: CLINIC | Age: 34
End: 2019-11-13
Payer: COMMERCIAL

## 2019-11-13 ENCOUNTER — LAB (OUTPATIENT)
Dept: LAB | Facility: CLINIC | Age: 34
End: 2019-11-13
Payer: COMMERCIAL

## 2019-11-13 DIAGNOSIS — Z52.4 KIDNEY DONOR: Primary | ICD-10-CM

## 2019-11-13 LAB
BACTERIA #/AREA URNS HPF: NORMAL /HPF
BILIRUB UR QL: NEGATIVE
CLARITY UR: CLEAR
COLOR UR: YELLOW
CREAT SERPL-MCNC: 1.21 MG/DL (ref 0.6–1.2)
CREAT UR-MCNC: 19.3 MG/DL
GFR SERPL CREATININE-BSD FRML MDRD: 59 ML/MIN/1.73M*2
GLUCOSE UR QL: NEGATIVE MG/DL
HGB UR QL: NEGATIVE
KETONES UR-MCNC: NEGATIVE MG/DL
LEUKOCYTE ESTERASE UR QL STRIP: NEGATIVE
MICROALBUMIN UR-MCNC: <7 MG/L (ref 0–30)
NITRITE UR QL: NEGATIVE
PH UR: 6 PH
PROT 24H UR-MCNC: <4 MG/DL (ref 0–21.4)
PROT UR STRIP-MCNC: NEGATIVE MG/DL
RBC #/AREA URNS HPF: NORMAL /HPF
SP GR UR: <=1.005 (ref 1–1.03)
SQUAMOUS #/AREA URNS HPF: NORMAL /HPF
UROBILINOGEN UR-MCNC: 0.2 E.U./DL
WBC #/AREA URNS HPF: NORMAL /HPF

## 2019-11-13 PROCEDURE — 81001 URINALYSIS AUTO W/SCOPE: CPT | Performed by: INTERNAL MEDICINE

## 2019-11-13 PROCEDURE — 82565 ASSAY OF CREATININE: CPT | Performed by: INTERNAL MEDICINE

## 2019-11-13 PROCEDURE — 84156 ASSAY OF PROTEIN URINE: CPT | Performed by: INTERNAL MEDICINE

## 2019-11-13 PROCEDURE — 36415 COLL VENOUS BLD VENIPUNCTURE: CPT | Performed by: INTERNAL MEDICINE

## 2019-11-13 PROCEDURE — 82570 ASSAY OF URINE CREATININE: CPT | Performed by: INTERNAL MEDICINE

## 2019-11-13 PROCEDURE — 82043 UR ALBUMIN QUANTITATIVE: CPT | Performed by: INTERNAL MEDICINE

## 2019-11-13 NOTE — PATIENT INSTRUCTIONS
Patient Education   Influenza (Flu) Vaccine (Inactivated or Recombinant): What You Need to Know  1. Why get vaccinated?  Influenza vaccine can prevent influenza (flu).  Flu is a contagious disease that spreads around the [place] every year, usually between October and May. Anyone can get the flu, but it is more dangerous for some people. Infants and young children, people 65 years of age and older, pregnant women, and people with certain health conditions or a weakened immune system are at greatest risk of flu complications.  Pneumonia, bronchitis, sinus infections and ear infections are examples of flu-related complications. If you have a medical condition, such as heart disease, cancer or diabetes, flu can make it worse.  Flu can cause fever and chills, sore throat, muscle aches, fatigue, cough, headache, and runny or stuffy nose. Some people may have vomiting and diarrhea, though this is more common in children than adults.  Each year thousands of people in the [place] die from flu, and many more are hospitalized. Flu vaccine prevents millions of illnesses and flu-related visits to the doctor each year.  2. Influenza vaccine  CDC recommends everyone 6 months of age and older get vaccinated every flu season. Children 6 months through 8 years of age may need 2 doses during a single flu season. Everyone else needs only 1 dose each flu season.  It takes about 2 weeks for protection to develop after vaccination.  There are many flu viruses, and they are always changing. Each year a new flu vaccine is made to protect against three or four viruses that are likely to cause disease in the upcoming flu season. Even when the vaccine doesn't exactly match these viruses, it may still provide some protection.  Influenza vaccine does not cause flu.  Influenza vaccine may be given at the same time as other vaccines.  3. Talk with your health care provider  Tell your vaccine provider if the person getting the  vaccine:  · Has had an allergic reaction after a previous dose of influenza vaccine, or has any severe, life-threatening allergies.  · Has ever had Guillain-Barré Syndrome (also called GBS).  In some cases, your health care provider may decide to postpone influenza vaccination to a future visit.  People with minor illnesses, such as a cold, may be vaccinated. People who are moderately or severely ill should usually wait until they recover before getting influenza vaccine.  Your health care provider can give you more information.  4. Risks of a vaccine reaction  · Soreness, redness, and swelling where shot is given, fever, muscle aches, and headache can happen after influenza vaccine.  · There may be a very small increased risk of Guillain-Barré Syndrome (GBS) after inactivated influenza vaccine (the flu shot).  Young children who get the flu shot along with pneumococcal vaccine (PCV13), and/or DTaP vaccine at the same time might be slightly more likely to have a seizure caused by fever. Tell your health care provider if a child who is getting flu vaccine has ever had a seizure.  People sometimes faint after medical procedures, including vaccination. Tell your provider if you feel dizzy or have vision changes or ringing in the ears.  As with any medicine, there is a very remote chance of a vaccine causing a severe allergic reaction, other serious injury, or death.  5. What if there is a serious problem?  An allergic reaction could occur after the vaccinated person leaves the clinic. If you see signs of a severe allergic reaction (hives, swelling of the face and throat, difficulty breathing, a fast heartbeat, dizziness, or weakness), call 9-1-1 and get the person to the nearest hospital.  For other signs that concern you, call your health care provider.  Adverse reactions should be reported to the Vaccine Adverse Event Reporting System (VAERS). Your health care provider will usually file this report, or you can do it  yourself. Visit the VAERS website at www.vaers.[facility].gov or call 1-279.351.5849.VAERS is only for reporting reactions, and VAERS staff do not give medical advice.  6. The National Vaccine Injury Compensation Program  The National Vaccine Injury Compensation Program (VICP) is a federal program that was created to compensate people who may have been injured by certain vaccines. Visit the VICP website at www.[facility].gov/vaccinecompensation or call 1-537.408.8963 to learn about the program and about filing a claim. There is a time limit to file a claim for compensation.  7. How can I learn more?  · Ask your healthcare provider.  · Call your local or state health department.  · Contact the Centers for Disease Control and Prevention (CDC):  ? Call 1-826.974.6407 (9-533-RYN-INFO) or  ? Visit CDC's www.cdc.gov/flu  Vaccine Information Statement (Interim) Inactivated Influenza Vaccine (8/15/2019)  This information is not intended to replace advice given to you by your health care provider. Make sure you discuss any questions you have with your health care provider.  Document Released: 10/12/2007 Document Revised: 08/19/2019 Document Reviewed: 08/19/2019  Elsevier Interactive Patient Education © 2019 Elsevier Inc.

## 2020-02-21 ENCOUNTER — TELEPHONE (OUTPATIENT)
Dept: TRANSPLANT | Facility: CLINIC | Age: 35
End: 2020-02-21

## 2020-02-21 DIAGNOSIS — Z52.4 KIDNEY DONOR: Primary | ICD-10-CM

## 2020-02-21 NOTE — TELEPHONE ENCOUNTER
Patient Call: Transplant Lab/Orders  Elle is coming to the [facility]/30[place]2020 at 0800 for lab draw(donor)  Please add orders in Epic  Thank you!      Reason for Call: Annual lab reorder  Callback needed? No

## 2020-02-21 NOTE — PATIENT INSTRUCTIONS
"  Patient Education     Upper Respiratory Infection, Adult  Most upper respiratory infections (URIs) are a viral infection of the air passages leading to the lungs. A URI affects the nose, throat, and upper air passages. The most common type of URI is nasopharyngitis and is typically referred to as \"the common cold.\"  URIs run their course and usually go away on their own. Most of the time, a URI does not require medical attention, but sometimes a bacterial infection in the upper airways can follow a viral infection. This is called a secondary infection. Sinus and middle ear infections are common types of secondary upper respiratory infections.  Bacterial pneumonia can also complicate a URI. A URI can worsen asthma and chronic obstructive pulmonary disease (COPD). Sometimes, these complications can require emergency medical care and may be life threatening.  What are the causes?  Almost all URIs are caused by viruses. A virus is a type of germ and can spread from one person to another.  What increases the risk?  You may be at risk for a URI if:  · You smoke.  · You have chronic heart or lung disease.  · You have a weakened defense (immune) system.  · You are very young or very old.  · You have nasal allergies or asthma.  · You work in crowded or poorly ventilated areas.  · You work in health care facilities or schools.    What are the signs or symptoms?  Symptoms typically develop 2-3 days after you come in contact with a cold virus. Most viral URIs last 7-10 days. However, viral URIs from the influenza virus (flu virus) can last 14-18 days and are typically more severe. Symptoms may include:  · Runny or stuffy (congested) nose.  · Sneezing.  · Cough.  · Sore throat.  · Headache.  · Fatigue.  · Fever.  · Loss of appetite.  · Pain in your forehead, behind your eyes, and over your cheekbones (sinus pain).  · Muscle aches.    How is this diagnosed?  Your health care provider may diagnose a URI by:  · Physical " exam.  · Tests to check that your symptoms are not due to another condition such as:  ? Strep throat.  ? Sinusitis.  ? Pneumonia.  ? Asthma.    How is this treated?  A URI goes away on its own with time. It cannot be cured with medicines, but medicines may be prescribed or recommended to relieve symptoms. Medicines may help:  · Reduce your fever.  · Reduce your cough.  · Relieve nasal congestion.    Follow these instructions at home:  · Take medicines only as directed by your health care provider.  · Gargle warm saltwater or take cough drops to comfort your throat as directed by your health care provider.  · Use a warm mist humidifier or inhale steam from a shower to increase air moisture. This may make it easier to breathe.  · Drink enough fluid to keep your urine clear or pale yellow.  · Eat soups and other clear broths and maintain good nutrition.  · Rest as needed.  · Return to work when your temperature has returned to normal or as your health care provider advises. You may need to stay home longer to avoid infecting others. You can also use a face mask and careful hand washing to prevent spread of the virus.  · Increase the usage of your inhaler if you have asthma.  · Do not use any tobacco products, including cigarettes, chewing tobacco, or electronic cigarettes. If you need help quitting, ask your health care provider.  How is this prevented?  The best way to protect yourself from getting a cold is to practice good hygiene.  · Avoid oral or hand contact with people with cold symptoms.  · Wash your hands often if contact occurs.    There is no clear evidence that vitamin C, vitamin E, echinacea, or exercise reduces the chance of developing a cold. However, it is always recommended to get plenty of rest, exercise, and practice good nutrition.  Contact a health care provider if:  · You are getting worse rather than better.  · Your symptoms are not controlled by medicine.  · You have chills.  · You have  worsening shortness of breath.  · You have brown or red mucus.  · You have yellow or brown nasal discharge.  · You have pain in your face, especially when you bend forward.  · You have a fever.  · You have swollen neck glands.  · You have pain while swallowing.  · You have white areas in the back of your throat.  Get help right away if:  · You have severe or persistent:  ? Headache.  ? Ear pain.  ? Sinus pain.  ? Chest pain.  · You have chronic lung disease and any of the following:  ? Wheezing.  ? Prolonged cough.  ? Coughing up blood.  ? A change in your usual mucus.  · You have a stiff neck.  · You have changes in your:  ? Vision.  ? Hearing.  ? Thinking.  ? Mood.  This information is not intended to replace advice given to you by your health care provider. Make sure you discuss any questions you have with your health care provider.  Document Released: 06/13/2002 Document Revised: 08/20/2017 Document Reviewed: 03/25/2015  Elsevier Interactive Patient Education © 2018 Elsevier Inc.        family

## 2020-03-11 ENCOUNTER — HEALTH MAINTENANCE LETTER (OUTPATIENT)
Age: 35
End: 2020-03-11

## 2020-03-27 ENCOUNTER — NURSE TRIAGE (OUTPATIENT)
Dept: FAMILY MEDICINE | Facility: CLINIC | Age: 35
End: 2020-03-27

## 2020-03-27 ENCOUNTER — TELEPHONE (OUTPATIENT)
Dept: FAMILY MEDICINE | Facility: CLINIC | Age: 35
End: 2020-03-27

## 2020-03-27 NOTE — TELEPHONE ENCOUNTER
COVID-19 SCREENING ASSESSMENT    SYMPTOM QUESTIONS    Are you experiencing a cough, fever, or shortness of breath? Cough  Describe symptoms: caller denies fever Offered for patient to call PCP for video, phone or in person visit if she felt needed- caller states she will try taking OTC cold medication over weekend and monitor symptoms and call for appt if needed next week.    POSITIVE EXPOSURE QUESTIONS    Have you had close contact with a confirmed case of coronavirus (COVID-19) in the last 14 days? no    In the last 14 days have you traveled to a country or state listed as an area of concern per the  travel advisory document? no  If yes, where? N/A    Are you a healthcare worker with known contact to a COVID-19 patient? 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 no  If yes, where? N/A    Are you seeing a physician and taking prescribed medications for any medical conditions? None    Reason for Disposition  • Fever, cough or shortness of breath AND no exposure.    Protocols used: [facility]    • Instructed patient on the following:  o Good social distancing practices.  o Wash your hands often with soap and water for at least 20 seconds, especially after blowing your nose, coughing, or sneezing; going to the bathroom; and before eating or preparing food.  o If soap and water is not available use an alcohol based hand  that contains at least 60% alcohol.  o Avoid touching your eyes, nose, and mouth with unwashed hands.  o If you become sick stay home.

## 2020-04-17 DIAGNOSIS — E03.8 ADULT ONSET HYPOTHYROIDISM: ICD-10-CM

## 2020-04-17 RX ORDER — LEVOTHYROXINE SODIUM 125 UG/1
TABLET ORAL
Qty: 90 TABLET | Refills: 1 | Status: SHIPPED | OUTPATIENT
Start: 2020-04-17 | End: 2020-04-20

## 2020-04-18 DIAGNOSIS — E03.8 ADULT ONSET HYPOTHYROIDISM: ICD-10-CM

## 2020-04-20 RX ORDER — LEVOTHYROXINE SODIUM 125 UG/1
TABLET ORAL
Qty: 90 TABLET | Refills: 1 | Status: SHIPPED | OUTPATIENT
Start: 2020-04-20 | End: 2020-11-09

## 2020-06-08 ENCOUNTER — TELEPHONE (OUTPATIENT)
Dept: INTERNAL MEDICINE | Facility: CLINIC | Age: 35
End: 2020-06-08

## 2020-06-08 DIAGNOSIS — F41.1 ANXIETY AS ACUTE REACTION TO EXCEPTIONAL STRESS: ICD-10-CM

## 2020-06-08 DIAGNOSIS — F43.0 ANXIETY AS ACUTE REACTION TO EXCEPTIONAL STRESS: ICD-10-CM

## 2020-06-08 DIAGNOSIS — E03.9 ACQUIRED HYPOTHYROIDISM: ICD-10-CM

## 2020-06-08 DIAGNOSIS — Z00.00 WELLNESS EXAMINATION: Primary | ICD-10-CM

## 2020-06-08 RX ORDER — ALPRAZOLAM 0.25 MG/1
TABLET ORAL
Qty: 30 TABLET | Refills: 0 | Status: SHIPPED | OUTPATIENT
Start: 2020-06-08 | End: 2021-04-07 | Stop reason: SDUPTHER

## 2020-06-08 NOTE — TELEPHONE ENCOUNTER
Caller would like to discuss (a) order Writer has advised caller of a callback from within 24 hours.    Patient: Sierra Kelley    Caller Name (Last and first, relation/role): Self    Name of Facility: na    Callback Number: [phone]    Best Availability: na    Fax Number: na    Additional Info: Pt need order for her thyroid & any other testing dr would like done for her yearly     Did you confirm the message with the caller: Yes    Is it okay that the nurse communicates your response through Inforamahart? No

## 2020-06-24 ENCOUNTER — APPOINTMENT (OUTPATIENT)
Dept: LAB | Facility: CLINIC | Age: 35
End: 2020-06-24
Payer: COMMERCIAL

## 2020-06-24 DIAGNOSIS — E03.9 ACQUIRED HYPOTHYROIDISM: ICD-10-CM

## 2020-06-24 DIAGNOSIS — Z00.00 WELLNESS EXAMINATION: ICD-10-CM

## 2020-06-24 LAB
ALBUMIN SERPL-MCNC: 4.1 G/DL (ref 3.5–5.3)
ALP SERPL-CCNC: 50 U/L (ref 37–98)
ALT SERPL-CCNC: 12 U/L (ref 7–52)
ANION GAP SERPL CALC-SCNC: 7 MMOL/L (ref 3–11)
AST SERPL-CCNC: 16 U/L
BASOPHILS # BLD AUTO: 0 10*3/UL
BASOPHILS NFR BLD AUTO: 0 % (ref 0–2)
BILIRUB SERPL-MCNC: 0.5 MG/DL (ref 0.2–1.4)
BUN SERPL-MCNC: 20 MG/DL (ref 7–25)
CALCIUM ALBUM COR SERPL-MCNC: 9.2 MG/DL (ref 8.6–10.3)
CALCIUM SERPL-MCNC: 9.3 MG/DL (ref 8.6–10.3)
CHLORIDE SERPL-SCNC: 104 MMOL/L (ref 98–107)
CHOLEST SERPL-MCNC: 154 MG/DL (ref 0–199)
CO2 SERPL-SCNC: 26 MMOL/L (ref 21–32)
CREAT SERPL-MCNC: 1.14 MG/DL (ref 0.6–1.1)
EOSINOPHIL # BLD AUTO: 0.1 10*3/UL
EOSINOPHIL NFR BLD AUTO: 1 % (ref 0–3)
ERYTHROCYTE [DISTWIDTH] IN BLOOD BY AUTOMATED COUNT: 13.1 % (ref 11.5–14)
FASTING STATUS PATIENT QL REPORTED: YES
GFR SERPL CREATININE-BSD FRML MDRD: 63 ML/MIN/1.73M*2
GLUCOSE SERPL-MCNC: 83 MG/DL (ref 70–105)
HCT VFR BLD AUTO: 37.3 % (ref 34–45)
HDLC SERPL-MCNC: 64 MG/DL
HGB BLD-MCNC: 12.7 G/DL (ref 11.5–15.5)
LDLC SERPL CALC-MCNC: 78 MG/DL (ref 20–99)
LYMPHOCYTES # BLD AUTO: 2.1 10*3/UL
LYMPHOCYTES NFR BLD AUTO: 34 % (ref 11–47)
MCH RBC QN AUTO: 31.9 PG (ref 28–33)
MCHC RBC AUTO-ENTMCNC: 33.9 G/DL (ref 32–36)
MCV RBC AUTO: 94 FL (ref 81–97)
MONOCYTES # BLD AUTO: 0.5 10*3/UL
MONOCYTES NFR BLD AUTO: 7 % (ref 3–11)
NEUTROPHILS # BLD AUTO: 3.5 10*3/UL
NEUTROPHILS NFR BLD AUTO: 57 % (ref 41–81)
PLATELET # BLD AUTO: 253 10*3/UL (ref 140–350)
PMV BLD AUTO: 7.1 FL (ref 6.9–10.8)
POTASSIUM SERPL-SCNC: 4.3 MMOL/L (ref 3.5–5.1)
PROT SERPL-MCNC: 6.7 G/DL (ref 6–8.3)
RBC # BLD AUTO: 3.97 10*6/ΜL (ref 3.7–5.3)
SODIUM SERPL-SCNC: 137 MMOL/L (ref 135–145)
TRIGL SERPL-MCNC: 60 MG/DL
TSH SERPL DL<=0.05 MIU/L-ACNC: 2.63 UIU/ML (ref 0.34–4.82)
WBC # BLD AUTO: 6.1 10*3/UL (ref 4.5–10.5)

## 2020-06-24 PROCEDURE — 85025 COMPLETE CBC W/AUTO DIFF WBC: CPT | Performed by: NURSE PRACTITIONER

## 2020-06-24 PROCEDURE — 36415 COLL VENOUS BLD VENIPUNCTURE: CPT | Performed by: NURSE PRACTITIONER

## 2020-06-24 PROCEDURE — 80061 LIPID PANEL: CPT | Performed by: NURSE PRACTITIONER

## 2020-06-24 PROCEDURE — 80053 COMPREHEN METABOLIC PANEL: CPT | Performed by: NURSE PRACTITIONER

## 2020-06-24 PROCEDURE — 84443 ASSAY THYROID STIM HORMONE: CPT | Performed by: NURSE PRACTITIONER

## 2020-06-26 ENCOUNTER — DOCUMENTATION ONLY (OUTPATIENT)
Dept: TRANSPLANT | Facility: CLINIC | Age: 35
End: 2020-06-26

## 2020-06-26 NOTE — PROGRESS NOTES
Today I resent message to other transplant center [facility] asking for a general update on the patient's anonymous recipient.  I researched the staffing and sent this message to two Coordinators listed on the Member Center Directory.  I will monitor for a response to the patient's request to learn how her recipient is doing and relay that information to the patient.  Below is the previous email sent to [facility] on June 2 with recipient identifiers redacted.  From: Elicia Centeno   Sent: Tuesday, June 2, 2020 11:18 AM  To: [email or web address]  Subject: Theodore [id number]    Anderson Stearns I received a request from our donor NIHARIKA  to see how the your recipient is doing?  They are now one year from the transplant.  Could you please get general information on how the recipient is doing, and if the recipient family is open to exchanging correspondence?  The donor seems to be very interested in sending a card at least to the recipient. But mainly would like to know how things are going?  Thanks!    Patient Information Donor Recipient   [facility]   Patient Melania BUNDY    Year of Birth [birth year]

## 2020-07-08 ENCOUNTER — TELEPHONE (OUTPATIENT)
Dept: TRANSPLANT | Facility: CLINIC | Age: 35
End: 2020-07-08

## 2020-07-08 NOTE — TELEPHONE ENCOUNTER
I received an update from [place] that the recipient is doing well.  Kidney function is good.  I have passed this information on to the patient today via email.

## 2020-09-23 ENCOUNTER — TELEPHONE (OUTPATIENT)
Dept: INTERNAL MEDICINE | Facility: CLINIC | Age: 35
End: 2020-09-23

## 2020-09-23 ENCOUNTER — APPOINTMENT (OUTPATIENT)
Dept: LAB | Facility: URGENT CARE | Age: 35
End: 2020-09-23
Payer: COMMERCIAL

## 2020-09-23 ENCOUNTER — OFFICE VISIT (OUTPATIENT)
Dept: INTERNAL MEDICINE | Facility: CLINIC | Age: 35
End: 2020-09-23
Payer: COMMERCIAL

## 2020-09-23 VITALS
DIASTOLIC BLOOD PRESSURE: 76 MMHG | TEMPERATURE: 96.9 F | OXYGEN SATURATION: 99 % | HEART RATE: 79 BPM | BODY MASS INDEX: 32.78 KG/M2 | WEIGHT: 197 LBS | SYSTOLIC BLOOD PRESSURE: 110 MMHG

## 2020-09-23 DIAGNOSIS — R10.84 GENERALIZED ABDOMINAL PAIN: Primary | ICD-10-CM

## 2020-09-23 DIAGNOSIS — Z52.4 KIDNEY DONOR: ICD-10-CM

## 2020-09-23 LAB — SARS-COV-2 RNA RESP QL NAA+PROBE: NEGATIVE

## 2020-09-23 PROCEDURE — C9803 HOPD COVID-19 SPEC COLLECT: HCPCS | Mod: NCP | Performed by: NURSE PRACTITIONER

## 2020-09-23 PROCEDURE — 87635 SARS-COV-2 COVID-19 AMP PRB: CPT | Performed by: NURSE PRACTITIONER

## 2020-09-23 PROCEDURE — 99214 OFFICE O/P EST MOD 30 MIN: CPT | Performed by: NURSE PRACTITIONER

## 2020-09-23 ASSESSMENT — ENCOUNTER SYMPTOMS
HEADACHES: 0
EYES NEGATIVE: 1
LIGHT-HEADEDNESS: 0
FREQUENCY: 0
DIFFICULTY URINATING: 0
DIZZINESS: 0
CARDIOVASCULAR NEGATIVE: 1
ENDOCRINE NEGATIVE: 1
CHEST TIGHTNESS: 0
SHORTNESS OF BREATH: 0
ABDOMINAL DISTENTION: 1
DIARRHEA: 0
MUSCULOSKELETAL NEGATIVE: 1
NAUSEA: 0
COUGH: 0
ABDOMINAL PAIN: 1
NUMBNESS: 0
FATIGUE: 1
CONSTIPATION: 1
WEAKNESS: 0

## 2020-09-23 ASSESSMENT — PAIN SCALES - GENERAL: PAINLEVEL: 0-NO PAIN

## 2020-09-23 NOTE — TELEPHONE ENCOUNTER
Caller would like to discuss (a) paperwork Writer has advised caller of a callback from within 24 hours.    Patient: Sierra Kelley    Caller Name (Last and first, relation/role): self    Name of Facility: na    Callback Number: [phone]    Best Availability: anytime    Fax Number: na    Additional Info: just was seen, had print out of over the counter medication she is supposed to get.  Forgot the paperwork there.  Wanting to know if it can get emailed to her.    [email or web address]    Did you confirm the message with the caller: Yes    Is it okay that the nurse communicates your response through "[employer]"hart? Yes

## 2020-09-23 NOTE — PROGRESS NOTES
Subjective      Patient ID: Sierra Kelley is a 35 y.o. female.    Chief Complaint   Patient presents with   • Physical     followup lab in June//works w 2 people that are positive for covid         HPI  HPI     ROS  Review of Systems   Constitutional: Positive for fatigue.   HENT: Negative.    Eyes: Negative.    Respiratory: Negative for cough, chest tightness and shortness of breath.    Cardiovascular: Negative.    Gastrointestinal: Positive for abdominal distention, abdominal pain and constipation. Negative for diarrhea and nausea.   Endocrine: Negative.    Genitourinary: Negative for difficulty urinating, frequency and urgency.   Musculoskeletal: Negative.    Skin: Negative.    Neurological: Negative for dizziness, weakness, light-headedness, numbness and headaches.        Comprehensive Medical and Social History  Patient Active Problem List   Diagnosis   • Other specified hypothyroidism   • Depression   • Anxiety   • Kidney donor     Past Medical History:   Diagnosis Date   • Anxiety    • Chronic headache    • Depression    • Hypothyroid      Past Surgical History:   Procedure Laterality Date   • THYROIDECTOMY  2003     No Known Allergies  Current Outpatient Medications   Medication Sig Dispense Refill   • ALPRAZolam (XANAX) 0.25 mg tablet TAKE 1 TABLET BY MOUTH EVERY NIGHT AT BEDTIME AS NEEDED FOR ANXIETY 30 tablet 0   • levothyroxine (SYNTHROID, LEVOTHROID) 125 mcg tablet TAKE 1 TABLET(125 MCG) BY MOUTH DAILY 90 tablet 1   • multivitamin with minerals tablet Take 1 tablet by mouth daily     • buPROPion XL (Wellbutrin XL) 300 mg 24 hr tablet Take 1 tablet (300 mg total) by mouth daily 90 tablet 3   • omega-3 fatty acids-fish oil (FISH OIL) 340-1,000 mg capsule Take 1 tab/cap by mouth daily.     • calcium carbonate-vitamin D3 600 mg(1,500mg) -400 unit per tablet Take 1 tablet by mouth daily.       No current facility-administered medications for this visit.      Social History     Occupational History   •  Occupation:    Tobacco Use   • Smoking status: Never Smoker   • Smokeless tobacco: Never Used   Substance and Sexual Activity   • Alcohol use: Yes   • Drug use: No   • Sexual activity: Defer   Social History Narrative   • Not on file       Physical Exam  /76   Pulse 79   Temp 36.1 °C (96.9 °F)   Wt 89.4 kg (197 lb)   SpO2 99%   BMI 32.78 kg/m²     GENERAL: Patient is alert and oriented, in no acute distress.  HEENT: Normocephalic, atraumatic. EOMI, PERRLA, sclerae anicteric.  Oropharynx within normal limits.  No tenderness to palpation of sinuses.    NECK: No cervical lymphadenopathy.  No carotid bruits, thyromegaly, or supraclavicular lymph nodes appreciated.  Trachea is midline.  LUNGS:  Clear to auscultation bilaterally, both anterior and posterior.  CARDIOVASCULAR EXAM: Regular rate and rhythm without murmur, rub, or gallop.    ABDOMEN:  Soft. Positive bowel sounds in all four quadrants.  No rebound. No guarding.  No hepatosplenomegaly or renal bruits appreciated.  Nontender, nondistended.  EXTREMITIES:  No clubbing, cyanosis, or edema.    Assessment/Plan     Problem List Items Addressed This Visit        Other    Kidney donor    Relevant Orders    Comprehensive metabolic panel Blood, Venous      Other Visit Diagnoses     Generalized abdominal pain    -  Primary    Relevant Orders    X-ray abdomen 1 view    Comprehensive metabolic panel Blood, Venous    COVID-19 PCR         1.  Generalized abdominal pain.  Patient was given instructions to increase her fiber intake and IBgard.  Patient will have a plain film abdomen.  She will also have a metabolic panel.  If symptoms do not improve, she is to return to clinic for further evaluation and also order CT of abdomen pelvis.  2.  Kidney donor.  Patient will need an updated CMP.  Patient would also benefit from referral to nephrology if her creatinine becomes elevated.  3.  Hypothyroid.  Patient did review her lab work and will keep her on  levothyroxine 125 mcg p.o. daily.  4.  Medical conditions are stable at this time.  Patient will be seen in 1 month or on as-needed basis.  5.  COVID testing.  Patient had 2+ coworkers and will need further investigation.        YANELI JOE CNP  8:48 AM, 9/23/2020.        A voice recognition program was used to aid in documentation of this record. Sometimes words are not presented exactly as they were spoken.  While efforts were made to carefully edit and correct any inaccuracies, some errors may be present.  Please take this into context.  Please contact the provider if errors are identified.

## 2020-10-03 ENCOUNTER — APPOINTMENT (OUTPATIENT)
Dept: LAB | Facility: URGENT CARE | Age: 35
End: 2020-10-03
Payer: COMMERCIAL

## 2020-10-03 ENCOUNTER — NURSE TRIAGE (OUTPATIENT)
Dept: FAMILY MEDICINE | Facility: CLINIC | Age: 35
End: 2020-10-03

## 2020-10-03 DIAGNOSIS — Z20.828 CONTACT WITH OR EXPOSURE TO VIRAL DISEASE: ICD-10-CM

## 2020-10-03 DIAGNOSIS — Z20.828 CONTACT WITH OR EXPOSURE TO VIRAL DISEASE: Primary | ICD-10-CM

## 2020-10-03 PROCEDURE — 87635 SARS-COV-2 COVID-19 AMP PRB: CPT | Performed by: NURSE PRACTITIONER

## 2020-10-03 PROCEDURE — 99211 OFF/OP EST MAY X REQ PHY/QHP: CPT | Mod: LAB | Performed by: NURSE PRACTITIONER

## 2020-10-03 NOTE — TELEPHONE ENCOUNTER
COVID-19 SCREENING ASSESSMENT     CRITERIA FOR TESTING  Yes to Any Symptoms or Asymptomatic Testing With Approved Criteria/MH Agreement  What symptoms are you experiencing? Cough, Muscle Pain, Headache, Sore Throat, Nausea and Fatigue  Asymptomatic testing group: N/A     ORDER QUESTIONS  Date of onset: 10/01/2020  Are you a healthcare worker,  or active  or [employer]? No  Do you live in an institutional setting (long term care, assisted living, corrections, etc)? No  Are you a dialysis or current cancer patient? No  Are you currently pregnant? No  Do you work in an educational setting? No     OTHER QUESTIONS  Are you a [employer] employee? No  Have you had close contact with a lab confirmed case of COVID-19 in the last 14 days? yes  Details on close contact: coworker    Reason for Disposition  • Patient has COVID-19 symptoms per CDC guidelines.    Protocols used: COVID-19 TRIAGE PROTOCOL MONUMENT HEALTH

## 2020-10-04 LAB — SARS-COV-2 RNA RESP QL NAA+PROBE: NEGATIVE

## 2020-10-12 DIAGNOSIS — F32.89 OTHER DEPRESSION: ICD-10-CM

## 2020-10-12 DIAGNOSIS — F41.9 ANXIETY: ICD-10-CM

## 2020-10-12 RX ORDER — BUPROPION HYDROCHLORIDE 300 MG/1
TABLET ORAL
Qty: 90 TABLET | Refills: 3 | Status: SHIPPED | OUTPATIENT
Start: 2020-10-12 | End: 2021-04-07 | Stop reason: SDUPTHER

## 2020-11-08 DIAGNOSIS — E03.8 ADULT ONSET HYPOTHYROIDISM: ICD-10-CM

## 2020-11-09 RX ORDER — LEVOTHYROXINE SODIUM 125 UG/1
TABLET ORAL
Qty: 90 TABLET | Refills: 2 | Status: SHIPPED | OUTPATIENT
Start: 2020-11-09 | End: 2021-04-07 | Stop reason: SDUPTHER

## 2021-01-03 ENCOUNTER — HEALTH MAINTENANCE LETTER (OUTPATIENT)
Age: 36
End: 2021-01-03

## 2021-03-10 ENCOUNTER — TELEPHONE (OUTPATIENT)
Dept: FAMILY MEDICINE | Facility: CLINIC | Age: 36
End: 2021-03-10

## 2021-03-10 DIAGNOSIS — Z23 HIGH PRIORITY FOR 2019-NCOV VACCINE: ICD-10-CM

## 2021-03-15 ENCOUNTER — CLINICAL SUPPORT (OUTPATIENT)
Dept: FAMILY MEDICINE | Facility: CLINIC | Age: 36
End: 2021-03-15

## 2021-03-15 DIAGNOSIS — Z23 HIGH PRIORITY FOR 2019-NCOV VACCINE: ICD-10-CM

## 2021-03-15 DIAGNOSIS — Z23 ENCOUNTER FOR VACCINATION: Primary | ICD-10-CM

## 2021-04-01 ENCOUNTER — TELEPHONE (OUTPATIENT)
Dept: INTERNAL MEDICINE | Facility: CLINIC | Age: 36
End: 2021-04-01

## 2021-04-01 DIAGNOSIS — E03.9 ACQUIRED HYPOTHYROIDISM: Primary | ICD-10-CM

## 2021-04-01 NOTE — TELEPHONE ENCOUNTER
Caller would like to discuss (a) return call Writer has advised caller of a callback from within 24 hours.    Patient: Sierra Kelley    Caller Name (Last and first, relation/role): self    Name of Facility: na    Callback Number: [phone]    Best Availability: anytime    Fax Number: na    Additional Info: Patient has lab scheduled 4/6/21 and appt on 4/7/21, would like to have Thyroid checked also can an order be added, also would provider like CMP done at this time? Please advise    Did you confirm the message with the caller: Yes    Is it okay that the nurse communicates your response through [employer]? No

## 2021-04-06 ENCOUNTER — APPOINTMENT (OUTPATIENT)
Dept: LAB | Facility: CLINIC | Age: 36
End: 2021-04-06
Payer: COMMERCIAL

## 2021-04-06 DIAGNOSIS — E03.9 ACQUIRED HYPOTHYROIDISM: ICD-10-CM

## 2021-04-06 LAB — TSH SERPL DL<=0.05 MIU/L-ACNC: 1.84 UIU/ML (ref 0.34–4.82)

## 2021-04-06 PROCEDURE — 36415 COLL VENOUS BLD VENIPUNCTURE: CPT | Performed by: NURSE PRACTITIONER

## 2021-04-06 PROCEDURE — 84443 ASSAY THYROID STIM HORMONE: CPT | Performed by: NURSE PRACTITIONER

## 2021-04-07 ENCOUNTER — TELEPHONE (OUTPATIENT)
Dept: INTERNAL MEDICINE | Facility: CLINIC | Age: 36
End: 2021-04-07

## 2021-04-07 ENCOUNTER — OFFICE VISIT (OUTPATIENT)
Dept: INTERNAL MEDICINE | Facility: CLINIC | Age: 36
End: 2021-04-07
Payer: COMMERCIAL

## 2021-04-07 VITALS
TEMPERATURE: 97.3 F | WEIGHT: 200 LBS | HEART RATE: 59 BPM | DIASTOLIC BLOOD PRESSURE: 76 MMHG | OXYGEN SATURATION: 97 % | BODY MASS INDEX: 33.28 KG/M2 | SYSTOLIC BLOOD PRESSURE: 128 MMHG

## 2021-04-07 DIAGNOSIS — F32.89 OTHER DEPRESSION: ICD-10-CM

## 2021-04-07 DIAGNOSIS — F41.9 ANXIETY: ICD-10-CM

## 2021-04-07 DIAGNOSIS — F41.1 ANXIETY AS ACUTE REACTION TO EXCEPTIONAL STRESS: ICD-10-CM

## 2021-04-07 DIAGNOSIS — E03.8 ADULT ONSET HYPOTHYROIDISM: ICD-10-CM

## 2021-04-07 DIAGNOSIS — F43.0 ANXIETY AS ACUTE REACTION TO EXCEPTIONAL STRESS: ICD-10-CM

## 2021-04-07 DIAGNOSIS — E03.8 OTHER SPECIFIED HYPOTHYROIDISM: Primary | ICD-10-CM

## 2021-04-07 DIAGNOSIS — M79.10 MYALGIA: ICD-10-CM

## 2021-04-07 DIAGNOSIS — R53.83 FATIGUE, UNSPECIFIED TYPE: ICD-10-CM

## 2021-04-07 PROCEDURE — 99214 OFFICE O/P EST MOD 30 MIN: CPT | Performed by: NURSE PRACTITIONER

## 2021-04-07 RX ORDER — ERGOCALCIFEROL 1.25 MG/1
50000 CAPSULE ORAL WEEKLY
Qty: 8 CAPSULE | Refills: 11 | Status: SHIPPED | OUTPATIENT
Start: 2021-04-07 | End: 2021-11-04 | Stop reason: ALTCHOICE

## 2021-04-07 RX ORDER — ALPRAZOLAM 0.25 MG/1
0.25 TABLET ORAL NIGHTLY PRN
Qty: 30 TABLET | Refills: 0 | Status: SHIPPED | OUTPATIENT
Start: 2021-04-07 | End: 2021-11-04 | Stop reason: SDUPTHER

## 2021-04-07 RX ORDER — BUPROPION HYDROCHLORIDE 300 MG/1
300 TABLET ORAL EVERY MORNING
Qty: 90 TABLET | Refills: 3 | Status: SHIPPED | OUTPATIENT
Start: 2021-04-07 | End: 2021-11-14

## 2021-04-07 RX ORDER — LEVOTHYROXINE SODIUM 125 UG/1
125 TABLET ORAL DAILY
Qty: 90 TABLET | Refills: 3 | Status: SHIPPED | OUTPATIENT
Start: 2021-04-07 | End: 2022-05-13

## 2021-04-07 ASSESSMENT — PAIN SCALES - GENERAL: PAINLEVEL: 0-NO PAIN

## 2021-04-07 NOTE — TELEPHONE ENCOUNTER
Caller would like to discuss (a) return call Writer has advised caller of a callback from within 24 hours.    Patient: Sierra Kelley    Caller Name (Last and first, relation/role): self     Name of Facility: n/a     Callback Number: [phone]    Best Availability: anytime     Fax Number: n/a     Additional Info: per pt states scripts are not at pharmacy that were ordered at appt this morning. Would like a confirmation c/b.     Did you confirm the message with the caller: Yes    Is it okay that the nurse communicates your response through [employer]? No

## 2021-04-07 NOTE — PROGRESS NOTES
Subjective      Patient ID: Sierra Kelley is a 36 y.o. female.    Chief Complaint   Patient presents with   • Hypothyroidism     followup//review lab        HPI  Patient presents clinic today for ongoing fatigue and lack of energy with brain fog.  Patient has been very busy with work and has had many multiple demands on her life.  She states she is eating well and exercising on a daily basis without any weight loss.  She did have her thyroid checked before she was seen today.  Was within recommended range.  We did discuss other options and alternatives.    Fatigue  This is a recurrent problem. The current episode started more than 1 month ago. The problem occurs constantly. The problem has been waxing and waning (She is eating well and exercises on a daily basis.). Associated symptoms include fatigue, myalgias, swollen glands and weakness. Pertinent negatives include no abdominal pain, anorexia, change in bowel habit, chest pain, chills, congestion, coughing, headaches, joint swelling, nausea or numbness. The symptoms are aggravated by exertion and stress. She has tried sleep, rest, relaxation and position changes for the symptoms. The treatment provided mild relief.        ROS  Review of Systems   Constitutional: Positive for fatigue and unexpected weight change. Negative for chills.   HENT: Negative.  Negative for congestion.    Eyes: Negative.    Respiratory: Negative for cough, chest tightness and shortness of breath.    Cardiovascular: Negative.  Negative for chest pain.   Gastrointestinal: Negative for abdominal pain, anorexia, change in bowel habit, constipation, diarrhea and nausea.   Endocrine: Negative.    Genitourinary: Negative for difficulty urinating, frequency and urgency.   Musculoskeletal: Positive for myalgias. Negative for joint swelling.   Skin: Negative.    Neurological: Positive for dizziness, weakness and light-headedness. Negative for numbness and headaches.   Psychiatric/Behavioral:  Negative for behavioral problems, self-injury, sleep disturbance and suicidal ideas. The patient is not nervous/anxious.         Comprehensive Medical and Social History  Patient Active Problem List   Diagnosis   • Other specified hypothyroidism   • Depression   • Anxiety   • Kidney donor     Past Medical History:   Diagnosis Date   • Anxiety    • Chronic headache    • Depression    • Hypothyroid      Past Surgical History:   Procedure Laterality Date   • THYROIDECTOMY       No Known Allergies  Current Outpatient Medications   Medication Sig Dispense Refill   • ALPRAZolam (XANAX) 0.25 mg tablet Take 1 tablet (0.25 mg total) by mouth nightly as needed for anxiety Max Daily Amount: 0.25 mg 30 tablet 0   • buPROPion XL (WELLBUTRIN XL) 300 mg 24 hr tablet Take 1 tablet (300 mg total) by mouth every morning 90 tablet 3   • levothyroxine (Synthroid) 125 mcg tablet Take 1 tablet (125 mcg total) by mouth daily 90 tablet 3   • multivitamin with minerals tablet Take 1 tablet by mouth daily     • omega-3 fatty acids-fish oil (FISH OIL) 340-1,000 mg capsule Take 1 tab/cap by mouth daily.     • ergocalciferol (VITAMIN D2) 1,250 mcg (50,000 unit) capsule Take 1 capsule (50,000 Units total) by mouth once a week 8 capsule 11     No current facility-administered medications for this visit.     Social History     Occupational History   • Occupation:    Tobacco Use   • Smoking status: Never Smoker   • Smokeless tobacco: Never Used   Substance and Sexual Activity   • Alcohol use: Yes   • Drug use: No   • Sexual activity: Defer   Social History Narrative   • Not on file       Physical Exam  /76   Pulse 59   Temp 36.3 °C (97.3 °F)   Wt 90.7 kg (200 lb)   SpO2 97%   BMI 33.28 kg/m²     GENERAL: Patient is alert and oriented, in no acute distress.  HEENT: Normocephalic, atraumatic. EOMI, PERRLA, sclerae anicteric.  Oropharynx within normal limits.  No tenderness to palpation of sinuses.    NECK: Cervical  lymphadenopathy.  No carotid bruits, thyromegaly, or supraclavicular lymph nodes appreciated.  Trachea is midline.  LUNGS:  Clear to auscultation bilaterally, both anterior and posterior.  CARDIOVASCULAR EXAM: Regular rate and rhythm without murmur, rub, or gallop.    ABDOMEN:  Soft. Positive bowel sounds in all four quadrants.  No rebound. No guarding.  No hepatosplenomegaly or renal bruits appreciated.  Nontender, nondistended.  EXTREMITIES:  No clubbing, cyanosis, or edema.    Assessment/Plan     Problem List Items Addressed This Visit        Endocrine/Metabolic    Other specified hypothyroidism - Primary    Relevant Medications    levothyroxine (Synthroid) 125 mcg tablet       Other    Depression    Relevant Medications    ALPRAZolam (XANAX) 0.25 mg tablet    buPROPion XL (WELLBUTRIN XL) 300 mg 24 hr tablet    Anxiety    Relevant Medications    buPROPion XL (WELLBUTRIN XL) 300 mg 24 hr tablet      Other Visit Diagnoses     Anxiety as acute reaction to exceptional stress        Relevant Medications    ALPRAZolam (XANAX) 0.25 mg tablet    buPROPion XL (WELLBUTRIN XL) 300 mg 24 hr tablet    Adult onset hypothyroidism        Relevant Medications    levothyroxine (Synthroid) 125 mcg tablet    Fatigue, unspecified type        Relevant Medications    ergocalciferol (VITAMIN D2) 1,250 mcg (50,000 unit) capsule    Other Relevant Orders    Vitamin D 25 hydroxy Blood, Venous    Myalgia        Relevant Orders    C-reactive protein (Inflammation) Blood, Venous    Sedimentation rate, automated Blood, Venous         1.  Hypothyroidism.  Patient's TSH was within recommended range.  Patient continue on levothyroxine 25 mcg p.o. daily.  2.  Anxiety and stress.  With mixed depression.  Patient would benefit from Wellbutrin to help with mood and stabilize her stress.  Patient has been working long hours and will need to do some stress management.  And work on her sleep.  3.  Fatigue.  Patient will start with 50,000 IUs of vitamin D  weekly and then transition to 2000 IUs daily.  Patient would benefit from adamant D lab in 6 weeks.  4.  Myalgias.  Patient would benefit from further work-up due to persistent muscle aches and pains.  We went into great detail about fatigue, sleep hygiene, mental health and dietary needs.  Patient was receptive to all suggestions.  5.  All medical conditions are stable at this time.  Patient would benefit from further lab work-up in 6 weeks.  If symptoms do persist, patient may need to benefit from nutritionist or counseling.        YANELI JOE CNP  11:27 AM, 4/9/2021.        A voice recognition program was used to aid in documentation of this record. Sometimes words are not presented exactly as they were spoken.  While efforts were made to carefully edit and correct any inaccuracies, some errors may be present.  Please take this into context.  Please contact the provider if errors are identified.

## 2021-04-09 ASSESSMENT — ENCOUNTER SYMPTOMS
SHORTNESS OF BREATH: 0
DIFFICULTY URINATING: 0
CHEST TIGHTNESS: 0
CHANGE IN BOWEL HABIT: 0
NUMBNESS: 0
ABDOMINAL PAIN: 0
CHILLS: 0
WEAKNESS: 1
ENDOCRINE NEGATIVE: 1
ANOREXIA: 0
COUGH: 0
LIGHT-HEADEDNESS: 1
JOINT SWELLING: 0
NERVOUS/ANXIOUS: 0
DIZZINESS: 1
SLEEP DISTURBANCE: 0
FREQUENCY: 0
MYALGIAS: 1
EYES NEGATIVE: 1
FATIGUE: 1
HEADACHES: 0
SWOLLEN GLANDS: 1
DIARRHEA: 0
CONSTIPATION: 0
CARDIOVASCULAR NEGATIVE: 1
NAUSEA: 0
UNEXPECTED WEIGHT CHANGE: 1

## 2021-04-25 ENCOUNTER — HEALTH MAINTENANCE LETTER (OUTPATIENT)
Age: 36
End: 2021-04-25

## 2021-07-02 DIAGNOSIS — Z52.4 KIDNEY DONOR: Primary | ICD-10-CM

## 2021-07-06 DIAGNOSIS — Z52.4 KIDNEY DONOR: ICD-10-CM

## 2021-07-06 LAB
ALBUMIN UR-MCNC: NEGATIVE MG/DL
APPEARANCE UR: CLEAR
BILIRUB UR QL STRIP: NEGATIVE
COLOR UR AUTO: NORMAL
CREAT SERPL-MCNC: 1.16 MG/DL (ref 0.52–1.04)
CREAT UR-MCNC: 29 MG/DL
GFR SERPL CREATININE-BSD FRML MDRD: 61 ML/MIN/{1.73_M2}
GLUCOSE UR STRIP-MCNC: NEGATIVE MG/DL
HGB UR QL STRIP: NEGATIVE
KETONES UR STRIP-MCNC: NEGATIVE MG/DL
LEUKOCYTE ESTERASE UR QL STRIP: NEGATIVE
MICROALBUMIN UR-MCNC: <5 MG/L
MICROALBUMIN/CREAT UR: NORMAL MG/G CR (ref 0–25)
NITRATE UR QL: NEGATIVE
PH UR STRIP: 7 PH (ref 5–7)
PROT UR-MCNC: <0.05 G/L
PROT/CREAT 24H UR: NORMAL G/G CR (ref 0–0.2)
RBC #/AREA URNS AUTO: 0 /HPF (ref 0–2)
SOURCE: NORMAL
SP GR UR STRIP: 1.01 (ref 1–1.03)
SQUAMOUS #/AREA URNS AUTO: <1 /HPF (ref 0–1)
UROBILINOGEN UR STRIP-MCNC: 0 MG/DL (ref 0–2)
WBC #/AREA URNS AUTO: <1 /HPF (ref 0–5)

## 2021-07-06 PROCEDURE — 36415 COLL VENOUS BLD VENIPUNCTURE: CPT | Performed by: PATHOLOGY

## 2021-07-06 PROCEDURE — 81001 URINALYSIS AUTO W/SCOPE: CPT | Performed by: PATHOLOGY

## 2021-07-06 PROCEDURE — 82565 ASSAY OF CREATININE: CPT | Performed by: PATHOLOGY

## 2021-07-06 PROCEDURE — 82043 UR ALBUMIN QUANTITATIVE: CPT | Performed by: PATHOLOGY

## 2021-07-06 PROCEDURE — 84156 ASSAY OF PROTEIN URINE: CPT | Performed by: PATHOLOGY

## 2021-10-09 ENCOUNTER — HEALTH MAINTENANCE LETTER (OUTPATIENT)
Age: 36
End: 2021-10-09

## 2021-11-03 DIAGNOSIS — F43.0 ANXIETY AS ACUTE REACTION TO EXCEPTIONAL STRESS: ICD-10-CM

## 2021-11-03 DIAGNOSIS — F41.1 ANXIETY AS ACUTE REACTION TO EXCEPTIONAL STRESS: ICD-10-CM

## 2021-11-04 ENCOUNTER — APPOINTMENT (OUTPATIENT)
Dept: LAB | Facility: CLINIC | Age: 36
End: 2021-11-04
Payer: COMMERCIAL

## 2021-11-04 ENCOUNTER — OFFICE VISIT (OUTPATIENT)
Dept: INTERNAL MEDICINE | Facility: CLINIC | Age: 36
End: 2021-11-04
Payer: COMMERCIAL

## 2021-11-04 ENCOUNTER — TELEPHONE (OUTPATIENT)
Dept: INTERNAL MEDICINE | Facility: CLINIC | Age: 36
End: 2021-11-04

## 2021-11-04 VITALS
OXYGEN SATURATION: 99 % | BODY MASS INDEX: 34.09 KG/M2 | HEART RATE: 86 BPM | TEMPERATURE: 98 F | DIASTOLIC BLOOD PRESSURE: 68 MMHG | RESPIRATION RATE: 16 BRPM | WEIGHT: 204.6 LBS | SYSTOLIC BLOOD PRESSURE: 94 MMHG | HEIGHT: 65 IN

## 2021-11-04 DIAGNOSIS — F41.1 ANXIETY AS ACUTE REACTION TO EXCEPTIONAL STRESS: Primary | ICD-10-CM

## 2021-11-04 DIAGNOSIS — R39.9 URINARY TRACT INFECTION SYMPTOMS: Primary | ICD-10-CM

## 2021-11-04 DIAGNOSIS — F43.0 ANXIETY AS ACUTE REACTION TO EXCEPTIONAL STRESS: Primary | ICD-10-CM

## 2021-11-04 DIAGNOSIS — R39.9 URINARY TRACT INFECTION SYMPTOMS: ICD-10-CM

## 2021-11-04 DIAGNOSIS — N39.0 ACUTE URINARY TRACT INFECTION: ICD-10-CM

## 2021-11-04 LAB
BACTERIA #/AREA URNS HPF: ABNORMAL /HPF
BILIRUB UR QL: NEGATIVE
CLARITY UR: ABNORMAL
COLOR UR: YELLOW
GLUCOSE UR QL: NEGATIVE MG/DL
HGB UR QL: ABNORMAL
KETONES UR-MCNC: NEGATIVE MG/DL
LEUKOCYTE ESTERASE UR QL STRIP: ABNORMAL
NITRITE UR QL: NEGATIVE
PH UR: 6 PH
PROT UR STRIP-MCNC: NEGATIVE MG/DL
RBC #/AREA URNS HPF: ABNORMAL /HPF
SP GR UR: <=1.005 (ref 1–1.03)
SQUAMOUS #/AREA URNS HPF: ABNORMAL /HPF
UROBILINOGEN UR-MCNC: 0.2 E.U./DL
WBC #/AREA URNS HPF: ABNORMAL /HPF

## 2021-11-04 PROCEDURE — 87088 URINE BACTERIA CULTURE: CPT | Performed by: NURSE PRACTITIONER

## 2021-11-04 PROCEDURE — 81001 URINALYSIS AUTO W/SCOPE: CPT | Performed by: NURSE PRACTITIONER

## 2021-11-04 PROCEDURE — 87077 CULTURE AEROBIC IDENTIFY: CPT | Performed by: NURSE PRACTITIONER

## 2021-11-04 PROCEDURE — 99213 OFFICE O/P EST LOW 20 MIN: CPT | Performed by: NURSE PRACTITIONER

## 2021-11-04 PROCEDURE — 87186 SC STD MICRODIL/AGAR DIL: CPT | Performed by: NURSE PRACTITIONER

## 2021-11-04 RX ORDER — ALPRAZOLAM 0.25 MG/1
0.25 TABLET ORAL NIGHTLY PRN
Qty: 30 TABLET | Refills: 0 | Status: SHIPPED | OUTPATIENT
Start: 2021-11-04 | End: 2022-10-06

## 2021-11-04 RX ORDER — ALPRAZOLAM 0.25 MG/1
TABLET ORAL
Qty: 30 TABLET | OUTPATIENT
Start: 2021-11-04

## 2021-11-04 RX ORDER — NITROFURANTOIN 25; 75 MG/1; MG/1
100 CAPSULE ORAL EVERY 12 HOURS
Qty: 14 CAPSULE | Refills: 0 | Status: SHIPPED | OUTPATIENT
Start: 2021-11-04 | End: 2021-11-11

## 2021-11-04 ASSESSMENT — PAIN SCALES - GENERAL: PAINLEVEL: 0-NO PAIN

## 2021-11-04 ASSESSMENT — ENCOUNTER SYMPTOMS: FREQUENCY: 1

## 2021-11-04 NOTE — PROGRESS NOTES
SUBJECTIVE:   Chief Complaint   Patient presents with   • Urinary Frequency       Sierra Kelley is a 36 y.o. old female who presents for follow up of the following problems:    Urinary Frequency   This is a recurrent problem. The current episode started in the past 7 days. The problem occurs every urination. The problem has been gradually worsening. The quality of the pain is described as burning. The pain is at a severity of 3/10. The pain is mild. There has been no fever. She is sexually active (one kidney). There is no history of pyelonephritis. Associated symptoms include frequency, hesitancy and urgency. Pertinent negatives include no chills, discharge, flank pain, hematuria, nausea, possible pregnancy, sweats or vomiting. She has tried increased fluids for the symptoms. The treatment provided no relief. Her past medical history is significant for a single kidney and a urological procedure.   Anxiety  Presents for follow-up visit. Symptoms include compulsions, decreased concentration, excessive worry, insomnia, irritability, nervous/anxious behavior and restlessness. Patient reports no chest pain, feeling of choking or nausea. Symptoms occur constantly. The most recent episode lasted 3 days (worse before menses). The severity of symptoms is moderate. The patient sleeps 6 hours per night. The quality of sleep is non-restorative. Nighttime awakenings: several.     Compliance with medications is %.        Review of Systems   Constitutional: Positive for irritability. Negative for chills.   Cardiovascular: Negative for chest pain.   Gastrointestinal: Negative for nausea and vomiting.   Genitourinary: Positive for frequency, hesitancy and urgency. Negative for flank pain and hematuria.   Psychiatric/Behavioral: Positive for decreased concentration. The patient is nervous/anxious and has insomnia.        BP 94/68 (BP Location: Left arm, Patient Position: Sitting, Cuff Size: Regular Adult)   Pulse 86   Temp  "36.7 °C (98 °F) (Temporal)   Resp 16   Ht 1.651 m (5' 5\")   Wt 92.8 kg (204 lb 9.6 oz)   SpO2 99%   BMI 34.05 kg/m²    OBJECTIVE:  She appears well, vital signs are as noted.   GENERAL: Patient in no acute distress and comfortable  HEENT: Normocephalic, atraumatic, EOMI, PERRLA, sclera anicteric.  TMs erythema and bulging.  Oral pharynx erythema with exudates nasal turbinates swollen with purulent drainage.  Cervical lymphadenopathy.  LUNGS: Clear to auscultation bilaterally both anterior and posterior  CARDIOVASCULAR: Regular rate and rhythm  ABDOMEN: Soft, positive bowel sounds.  No rebound or guarding.  EXTREMITIES: No clubbing, cyanosis or edema.    ASSESSMENT:   Diagnoses and all orders for this visit:    Anxiety as acute reaction to exceptional stress  -     ALPRAZolam (XANAX) 0.25 mg tablet; Take 1 tablet (0.25 mg total) by mouth nightly as needed for anxiety Max Daily Amount: 0.25 mg    Acute urinary tract infection  -     nitrofurantoin, macrocrystal-monohydrate, (Macrobid) 100 mg capsule; Take 1 capsule (100 mg total) by mouth every 12 (twelve) hours for 7 days         PLAN:   1.  Anxiety.  Patient will have a refill of her alprazolam.  Patient is also to do a short trial of Viibryd 10 mg tablet and then increase to 20 mg for 2 weeks.  If the dosage is correct, we will send in a updated prescription for her at dosage that is appropriate.  2. Symptomatic therapy suggested: push fluids, rest, gargle warm salt water, use vaporizer or mist prn, use acetaminophen, ibuprofen prn and return office visit prn if symptoms persist or worsen. Lack of antibiotic effectiveness discussed with her. Call or return to clinic prn if these symptoms worsen or fail to improve as anticipated.   3.  Medical conditions are stable at this time.  Patient will be seen next scheduled appointment.  "

## 2021-11-04 NOTE — TELEPHONE ENCOUNTER
Medication refill request:  Medication(s):  xanax not filled due to (route to Nurse Pool) Non-delegated medication - provider fill only

## 2021-11-04 NOTE — TELEPHONE ENCOUNTER
Caller would like to discuss (a) medication Writer has advised caller of a callback from within 24 hours.    Patient: Sierra Kelley    Caller Name (Last and first, relation/role): Self    Name of Facility: NA    Callback Number: [phone]    Best Availability: Anytime     Fax Number: NA    Additional Info: pt states pharmacy never received the perscription for her xanex refill or the prescription for a new antibiotic    Did you confirm the message with the caller: Yes    Is it okay that the nurse communicates your response through "[employer]hart? No

## 2021-11-04 NOTE — TELEPHONE ENCOUNTER
Care Due:                  Date            Visit Type   Department     Provider  --------------------------------------------------------------------------------                              ESTABLISHED   [facility] INTERNAL  Last Visit: 04-      PATIENT      LEXX JOE                              ESTABLISHED   [facility] INTER[facility]  Next Visit: 11-      PATIENT      LEXX JOE                                                            Last  Test          Frequency    Reason                     Performed    Due Date  --------------------------------------------------------------------------------  Ca..........  12 months..  ergocalciferol...........  06- 06-    Vitamin D...  12 months..  ergocalciferol...........  Not Found    Overdue    Powered by [employer]. Reference number: [id number]. 11/03/2021 6:18:13 PM SUJATA

## 2021-11-06 LAB — BACTERIA UR CULT: ABNORMAL

## 2021-11-13 DIAGNOSIS — F41.9 ANXIETY: ICD-10-CM

## 2021-11-13 DIAGNOSIS — F32.89 OTHER DEPRESSION: ICD-10-CM

## 2021-11-14 RX ORDER — BUPROPION HYDROCHLORIDE 300 MG/1
TABLET ORAL
Qty: 90 TABLET | Refills: 3 | Status: SHIPPED | OUTPATIENT
Start: 2021-11-14 | End: 2022-01-25 | Stop reason: ALTCHOICE

## 2021-11-18 ASSESSMENT — ENCOUNTER SYMPTOMS
CHILLS: 0
VOMITING: 0
SWEATS: 0
FLANK PAIN: 0
FEELING OF CHOKING: 0
COMPULSIONS: 1
NERVOUS/ANXIOUS: 1
DECREASED CONCENTRATION: 1
HEMATURIA: 0
RESTLESSNESS: 1
NAUSEA: 0
INSOMNIA: 1
IRRITABILITY: 1

## 2021-11-29 ENCOUNTER — TELEPHONE (OUTPATIENT)
Dept: INTERNAL MEDICINE | Facility: CLINIC | Age: 36
End: 2021-11-29
Payer: COMMERCIAL

## 2021-11-29 DIAGNOSIS — F32.89 OTHER DEPRESSION: Primary | ICD-10-CM

## 2021-11-29 NOTE — TELEPHONE ENCOUNTER
Prescription pended for your review, thank you. Did return call to patient and let her know that Christen is out of the clinic today, states understanding.

## 2021-11-29 NOTE — TELEPHONE ENCOUNTER
Caller would like to discuss (a) medication Writer has advised caller of a callback from within 24 hours.    Patient: Sierra Kelley    Caller Name (Last and first, relation/role): self    Name of Facility: na    Callback Number: [phone]    Best Availability: anytime    Fax Number: na    Additional Info: would like a prescription for viibrye and she really likes it as it seems to be working 40mg     Did you confirm the message with the caller: Yes    Is it okay that the nurse communicates your response through [employer]? No

## 2021-11-30 RX ORDER — VILAZODONE HYDROCHLORIDE 40 MG/1
40 TABLET ORAL DAILY
Qty: 90 TABLET | Refills: 0 | Status: SHIPPED | OUTPATIENT
Start: 2021-11-30 | End: 2022-03-01

## 2021-11-30 NOTE — TELEPHONE ENCOUNTER
KS    Please send in viibryd 40mg po daily. 90 tabs with no refills.   Christen Hicks, CNP    Message text      Rx sent.

## 2022-01-03 ENCOUNTER — CLINICAL SUPPORT (OUTPATIENT)
Dept: FAMILY MEDICINE | Facility: CLINIC | Age: 37
End: 2022-01-03
Payer: COMMERCIAL

## 2022-01-03 DIAGNOSIS — Z23 ENCOUNTER FOR VACCINATION: Primary | ICD-10-CM

## 2022-01-03 PROCEDURE — 91306 MODERNA SARS-COV-2 VACCINE: CPT | Performed by: NURSE PRACTITIONER

## 2022-01-03 PROCEDURE — 0064A * NO LONGER ACTIVE 2024* PR IMM ADMN SARSCOV2 50 MCG/0.25 ML BOOSTER DOSE: CPT | Performed by: NURSE PRACTITIONER

## 2022-01-25 ENCOUNTER — OFFICE VISIT (OUTPATIENT)
Dept: URGENT CARE | Facility: URGENT CARE | Age: 37
End: 2022-01-25
Payer: COMMERCIAL

## 2022-01-25 VITALS
HEART RATE: 66 BPM | SYSTOLIC BLOOD PRESSURE: 110 MMHG | WEIGHT: 190 LBS | HEIGHT: 65 IN | DIASTOLIC BLOOD PRESSURE: 64 MMHG | OXYGEN SATURATION: 98 % | BODY MASS INDEX: 31.65 KG/M2 | TEMPERATURE: 98.6 F | RESPIRATION RATE: 16 BRPM

## 2022-01-25 DIAGNOSIS — M79.675 GREAT TOE PAIN, LEFT: Primary | ICD-10-CM

## 2022-01-25 DIAGNOSIS — Z52.4 KIDNEY DONOR: ICD-10-CM

## 2022-01-25 DIAGNOSIS — M10.9 ACUTE GOUT INVOLVING TOE OF LEFT FOOT, UNSPECIFIED CAUSE: ICD-10-CM

## 2022-01-25 LAB
ANION GAP SERPL CALC-SCNC: 3 MMOL/L (ref 3–11)
BUN SERPL-MCNC: 18 MG/DL (ref 7–25)
CALCIUM SERPL-MCNC: 10.1 MG/DL (ref 8.6–10.3)
CHLORIDE SERPL-SCNC: 105 MMOL/L (ref 98–107)
CO2 SERPL-SCNC: 31 MMOL/L (ref 21–32)
CREAT SERPL-MCNC: 1.1 MG/DL (ref 0.6–1.1)
GFR SERPL CREATININE-BSD FRML MDRD: 67 ML/MIN/1.73M*2
GLUCOSE SERPL-MCNC: 90 MG/DL (ref 70–105)
POTASSIUM SERPL-SCNC: 4.6 MMOL/L (ref 3.5–5.1)
SODIUM SERPL-SCNC: 139 MMOL/L (ref 128–145)
URATE SERPL-MCNC: 4.8 MG/DL (ref 2.3–6.6)

## 2022-01-25 PROCEDURE — 84550 ASSAY OF BLOOD/URIC ACID: CPT | Performed by: PHYSICIAN ASSISTANT

## 2022-01-25 PROCEDURE — 99214 OFFICE O/P EST MOD 30 MIN: CPT | Performed by: PHYSICIAN ASSISTANT

## 2022-01-25 PROCEDURE — 80048 BASIC METABOLIC PNL TOTAL CA: CPT | Performed by: PHYSICIAN ASSISTANT

## 2022-01-25 PROCEDURE — 36415 COLL VENOUS BLD VENIPUNCTURE: CPT | Performed by: PHYSICIAN ASSISTANT

## 2022-01-25 RX ORDER — PREDNISONE 50 MG/1
50 TABLET ORAL DAILY
Qty: 5 TABLET | Refills: 0 | Status: SHIPPED | OUTPATIENT
Start: 2022-01-25 | End: 2022-01-30

## 2022-01-25 ASSESSMENT — ENCOUNTER SYMPTOMS
WOUND: 0
ACTIVITY CHANGE: 0
HEMATURIA: 0

## 2022-01-25 ASSESSMENT — PAIN SCALES - GENERAL: PAINLEVEL: 2

## 2022-01-26 NOTE — PROGRESS NOTES
"Subjective      Sierra Kelley is a 37 y.o. female who presents for evaluation of pain in her left great toe that has gradually worsened over the past 24 hours or so.    Pain is located in the first metatarsal phalangeal joint and just distal to the area as well.  She feels that it is slightly reddened and swollen.  Denies any known injury.  No known prior gout.  She is status post a kidney donation.  Admits she has had busier week has been on her foot quite a bit in the past 24 hours.  Also admits that diet has been poor and has been high in salt.  Had a few more drinks than she typically does which is usually about a beer every other day.          The following have been reviewed and updated as appropriate in this visit:   Allergies  Meds  Problems  Med Hx  Surg Hx  Fam Hx         Review of Systems   Constitutional: Negative for activity change.   Genitourinary: Negative for hematuria.   Skin: Negative for wound.   Allergic/Immunologic: Negative for immunocompromised state.       Objective   /64 (BP Location: Left arm, Patient Position: Sitting, Cuff Size: Regular Adult)   Pulse 66   Temp 37 °C (98.6 °F) (Temporal)   Resp 16   Ht 1.651 m (5' 5\")   Wt 86.2 kg (190 lb)   SpO2 98%   BMI 31.62 kg/m²     Physical Exam  Vitals and nursing note reviewed.   Constitutional:       General: She is not in acute distress.     Appearance: She is well-developed. She is not ill-appearing, toxic-appearing or diaphoretic.   HENT:      Head: Normocephalic and atraumatic.   Eyes:      General: No scleral icterus.        Right eye: No discharge.         Left eye: No discharge.      Conjunctiva/sclera: Conjunctivae normal.   Pulmonary:      Effort: Pulmonary effort is normal. No respiratory distress.   Musculoskeletal:         General: Tenderness (Left first metatarsophalangeal joint only tender slightly swollen and reddened ) present. Normal range of motion.   Neurological:      General: No focal deficit present.    "   Mental Status: She is alert and oriented to person, place, and time.      Deep Tendon Reflexes: Reflexes are normal and symmetric.   Psychiatric:         Behavior: Behavior normal.         Thought Content: Thought content normal.         Judgment: Judgment normal.         Assessment/Plan   Diagnoses and all orders for this visit:    Great toe pain, left  -     Uric acid Blood, Venous  -     Basic metabolic panel Blood, Venous; Future    Kidney donor  -     Basic metabolic panel Blood, Venous; Future    Acute gout involving toe of left foot, unspecified cause  -     predniSONE 50 mg tablet; Take 1 tablet (50 mg total) by mouth daily for 5 days    Suspect acute gout flare given patient's history and the physical exam findings.  Given she has only one kidney I did order basic metabolic panel and the creatinine is 1.1 which is near or slightly improved from baseline.  Will trial prednisone 50 mg daily and she can try some topical Voltaren.  I did discuss the case with her primary care provider and recommended to follow-up within the next few days.  Suspect diet related does not appear to be the source of any kidney issue.    LINDSAY Pham

## 2022-01-31 ENCOUNTER — APPOINTMENT (OUTPATIENT)
Dept: LAB | Facility: URGENT CARE | Age: 37
End: 2022-01-31
Payer: COMMERCIAL

## 2022-01-31 DIAGNOSIS — Z20.822 CONTACT WITH AND (SUSPECTED) EXPOSURE TO COVID-19: ICD-10-CM

## 2022-01-31 LAB — SARS-COV-2 RNA RESP QL NAA+PROBE: NEGATIVE

## 2022-01-31 PROCEDURE — 99211 OFF/OP EST MAY X REQ PHY/QHP: CPT | Mod: LAB | Performed by: FAMILY MEDICINE

## 2022-01-31 PROCEDURE — U0005 INFEC AGEN DETEC AMPLI PROBE: HCPCS | Performed by: FAMILY MEDICINE

## 2022-01-31 PROCEDURE — 87635 SARS-COV-2 COVID-19 AMP PRB: CPT | Performed by: FAMILY MEDICINE

## 2022-02-28 DIAGNOSIS — F32.89 OTHER DEPRESSION: ICD-10-CM

## 2022-02-28 NOTE — TELEPHONE ENCOUNTER
No new care gaps identified.  Powered by [employer]. Reference number: [id number]. 2/28/2022 8:34:45 AM MST

## 2022-03-01 RX ORDER — VILAZODONE HYDROCHLORIDE 40 MG/1
TABLET ORAL
Qty: 90 TABLET | Refills: 1 | Status: SHIPPED | OUTPATIENT
Start: 2022-03-01 | End: 2023-04-17 | Stop reason: DRUGHIGH

## 2022-03-01 NOTE — TELEPHONE ENCOUNTER
Medication refill request:  Medication(s):  Viibryd not filled due to (route to Nurse Pool) Previous refill visit indicating specific care plan, please review for refill

## 2022-03-08 ENCOUNTER — TELEPHONE (OUTPATIENT)
Dept: INTERNAL MEDICINE | Facility: CLINIC | Age: 37
End: 2022-03-08
Payer: COMMERCIAL

## 2022-03-09 NOTE — PLAN OF CARE
Echocardiogram 9/22/20:   LVEF 60%  IVS 1.2cm  LVPW 1.0cm  LA 22.8 ml/m²  CHADSVASc Stroke Risk Score = 6 (Age, Gender, CHF, HTN, CVA)  Anticoagulation: Warfarin  Antiarrhythmic: Flecainide    Stress Test 2/25/19: WNL w/ EF 76%    Holter 9/11/17: Sinus rhythm with average HR of 65 bpm. Rare ventricular and supraventricular ectopy.    CXR 2/6/20-->WNL    Sleep apnea status: SKYE w/ CPAP; dx 5/2017      - Continue current therapy.   - Advised patient to discuss shortness of breath with pcp.    VS: vital signs stable.   LDA: Right and left PIV SL  Neuro: A&Ox4.  GI/: Euceda removed at 1030, voided 500mL, PVR 3mL       no BM, passing gas, received senna, miralax, MOM, and suppository  Diet/appetite: Regular diet, fair PO intake.  Pain/Nausea/Vomiting: Oxycodone and Tylenol given for incisional pain with relief, denies nausea.  Skin: Surgical incision with liquid bandage d/I.  Mobility: up ad guerita, ambulating in halls  Plan: Continue plan of care.

## 2022-05-11 ENCOUNTER — PATIENT MESSAGE (OUTPATIENT)
Dept: INTERNAL MEDICINE | Facility: CLINIC | Age: 37
End: 2022-05-11
Payer: COMMERCIAL

## 2022-05-11 DIAGNOSIS — U07.1 LAB TEST POSITIVE FOR DETECTION OF COVID-19 VIRUS: Primary | ICD-10-CM

## 2022-05-12 ENCOUNTER — HOSPITAL ENCOUNTER (OUTPATIENT)
Dept: INFUSION THERAPY | Facility: HOSPITAL | Age: 37
Discharge: HOME | End: 2022-05-12
Payer: COMMERCIAL

## 2022-05-12 VITALS
RESPIRATION RATE: 17 BRPM | OXYGEN SATURATION: 97 % | DIASTOLIC BLOOD PRESSURE: 75 MMHG | SYSTOLIC BLOOD PRESSURE: 124 MMHG | TEMPERATURE: 97 F | HEART RATE: 56 BPM

## 2022-05-12 DIAGNOSIS — E03.8 ADULT ONSET HYPOTHYROIDISM: ICD-10-CM

## 2022-05-12 DIAGNOSIS — U07.1 COVID-19 VIRUS INFECTION: Primary | ICD-10-CM

## 2022-05-12 PROCEDURE — 6360000200 HC RX 636 W HCPCS (ALT 250 FOR IP): Performed by: NURSE PRACTITIONER

## 2022-05-12 PROCEDURE — 96365 THER/PROPH/DIAG IV INF INIT: CPT

## 2022-05-12 RX ORDER — DIPHENHYDRAMINE HYDROCHLORIDE 50 MG/ML
25-50 INJECTION INTRAMUSCULAR; INTRAVENOUS AS NEEDED
Status: CANCELLED | OUTPATIENT
Start: 2022-05-12

## 2022-05-12 RX ORDER — ALBUTEROL SULFATE 90 UG/1
2-3 INHALANT RESPIRATORY (INHALATION) AS NEEDED
Status: CANCELLED | OUTPATIENT
Start: 2022-05-13

## 2022-05-12 RX ORDER — ACETAMINOPHEN 500 MG
500 TABLET ORAL EVERY 4 HOURS PRN
Status: CANCELLED | OUTPATIENT
Start: 2022-05-12

## 2022-05-12 RX ORDER — ALBUTEROL SULFATE 90 UG/1
2-3 INHALANT RESPIRATORY (INHALATION) AS NEEDED
Status: CANCELLED | OUTPATIENT
Start: 2022-05-12

## 2022-05-12 RX ORDER — SODIUM CHLORIDE 9 MG/ML
0-999 INJECTION, SOLUTION INTRAVENOUS CONTINUOUS PRN
Status: CANCELLED | OUTPATIENT
Start: 2022-05-12 | End: 2022-05-13

## 2022-05-12 RX ORDER — FAMOTIDINE 10 MG/ML
20 INJECTION INTRAVENOUS AS NEEDED
Status: CANCELLED | OUTPATIENT
Start: 2022-05-13

## 2022-05-12 RX ORDER — DIPHENHYDRAMINE HYDROCHLORIDE 50 MG/ML
25-50 INJECTION INTRAMUSCULAR; INTRAVENOUS AS NEEDED
Status: CANCELLED | OUTPATIENT
Start: 2022-05-14

## 2022-05-12 RX ORDER — SODIUM CHLORIDE 9 MG/ML
0-999 INJECTION, SOLUTION INTRAVENOUS CONTINUOUS PRN
Status: CANCELLED | OUTPATIENT
Start: 2022-05-13 | End: 2022-05-14

## 2022-05-12 RX ORDER — FAMOTIDINE 10 MG/ML
20 INJECTION INTRAVENOUS AS NEEDED
Status: CANCELLED | OUTPATIENT
Start: 2022-05-12

## 2022-05-12 RX ORDER — DIPHENHYDRAMINE HYDROCHLORIDE 50 MG/ML
25-50 INJECTION INTRAMUSCULAR; INTRAVENOUS AS NEEDED
Status: CANCELLED | OUTPATIENT
Start: 2022-05-13

## 2022-05-12 RX ORDER — ACETAMINOPHEN 500 MG
500 TABLET ORAL EVERY 4 HOURS PRN
Status: CANCELLED | OUTPATIENT
Start: 2022-05-14

## 2022-05-12 RX ORDER — SODIUM CHLORIDE 9 MG/ML
0-999 INJECTION, SOLUTION INTRAVENOUS CONTINUOUS PRN
Status: CANCELLED | OUTPATIENT
Start: 2022-05-14 | End: 2022-05-15

## 2022-05-12 RX ORDER — EPINEPHRINE 1 MG/ML
0.3 INJECTION INTRAMUSCULAR; INTRAVENOUS; SUBCUTANEOUS AS NEEDED
Status: CANCELLED | OUTPATIENT
Start: 2022-05-12

## 2022-05-12 RX ORDER — ALBUTEROL SULFATE 90 UG/1
2-3 INHALANT RESPIRATORY (INHALATION) AS NEEDED
Status: CANCELLED | OUTPATIENT
Start: 2022-05-14

## 2022-05-12 RX ORDER — FAMOTIDINE 10 MG/ML
20 INJECTION INTRAVENOUS AS NEEDED
Status: CANCELLED | OUTPATIENT
Start: 2022-05-14

## 2022-05-12 RX ORDER — EPINEPHRINE 1 MG/ML
0.3 INJECTION INTRAMUSCULAR; INTRAVENOUS; SUBCUTANEOUS AS NEEDED
Status: CANCELLED | OUTPATIENT
Start: 2022-05-13

## 2022-05-12 RX ORDER — ACETAMINOPHEN 500 MG
500 TABLET ORAL EVERY 4 HOURS PRN
Status: CANCELLED | OUTPATIENT
Start: 2022-05-13

## 2022-05-12 RX ORDER — EPINEPHRINE 1 MG/ML
0.3 INJECTION INTRAMUSCULAR; INTRAVENOUS; SUBCUTANEOUS AS NEEDED
Status: CANCELLED | OUTPATIENT
Start: 2022-05-14

## 2022-05-12 RX ADMIN — REMDESIVIR 200 MG: 100 INJECTION, POWDER, LYOPHILIZED, FOR SOLUTION INTRAVENOUS at 13:13

## 2022-05-12 NOTE — TELEPHONE ENCOUNTER
Christen,  I did call over to Infusion pharmacy and patient would qualify for covid treatment if interested/ indicated. Pended for review.   Thank you,  Malina

## 2022-05-12 NOTE — INTERDISCIPLINARY/THERAPY
COVID Outpatient Therapy    Patient contacted regarding Covid treatment.  Christen Hicks CNP ordered that the patient may receive Paxlovid, Sotrovimab, Remdesivir and Molnupiravir.      Patient is a Tier 4 risk group according to NIH standards due to Vaccinated, neurodevelopmental disorder, and s/p kidney donation (one kidney).     Date of symptom onset was 5/6/22.      Provider approved therapy options discussed with patient.  Preferred therapies (listed in order of preference) include:    Paxlovid (patient past day 5 and has DDI)  Remdesivir.    When neither of the preferred therapies are available, feasible to use, or clinically appropriate Bebtelovimab or Molnupiravir (listed alphabetically only) may be options if clinically appropriate.    Patient was offered Remdesivir.  Patient counseled that Remdesivir is an antiviral therapy that is delivered via IV therapy.  Patient will have 3 consecutive day appointments lasting between 2-2.5 hours to have IV started, medication infused and an hour monitoring period.   The medication and the administration of the medication is billed to the patient or their insurance.  Patient will be contacted by a  to schedule in an infusion setting.  No visitors or guests are allowed to attend the visit with the patient.    Staci Mullen, RaynaD

## 2022-05-12 NOTE — TELEPHONE ENCOUNTER
Care Due:                  Date            Visit Type   Department     Provider  --------------------------------------------------------------------------------                              ESTABLISHED   [facility] INTER[facility]  Last Visit: 11-      PATIENT      MED            YANELI JOE  Next Visit: None Scheduled  None         None Found                                                            Last  Test          Frequency    Reason                     Performed    Due Date  --------------------------------------------------------------------------------  Office Visit  6 months...  ALPRAZolam...............  11- 05-    TSH.........  12 months..  levothyroxine............  04- 04-    [employer] Embedded Care Gaps. Reference number: [id number]. 5/12/2022 4:59:48 AM SUJATA

## 2022-05-12 NOTE — TELEPHONE ENCOUNTER
From: Sierra Kelley  To: CHRISTEN JOE CNP  Sent: 5/11/2022 10:33 PM MDT  Subject: Covid positive    Hi Christen,    I tested positive for Covid on Saturday night and was out of town. I went to the ER on Sunday because I felt like I was dying and they gave me some fluids and anti nausea stuff. I am home now and am currently on Benzonatate for my cough. I have almost all of the symptoms of Covid right now and the mucus coming out of my nose is now yellow. Is that normal with covid or am I developing a bacterial infection? Im vaccinated and bolstered so having Covid take me down this hard is so so frustrating. If there is anything you can think of that would help me feel better I am all ears!     Thank you,  Sierra Kelley

## 2022-05-13 ENCOUNTER — HOSPITAL ENCOUNTER (OUTPATIENT)
Dept: INFUSION THERAPY | Facility: HOSPITAL | Age: 37
Discharge: HOME | End: 2022-05-13
Payer: COMMERCIAL

## 2022-05-13 VITALS
DIASTOLIC BLOOD PRESSURE: 66 MMHG | SYSTOLIC BLOOD PRESSURE: 113 MMHG | TEMPERATURE: 98.2 F | HEART RATE: 61 BPM | OXYGEN SATURATION: 96 %

## 2022-05-13 DIAGNOSIS — U07.1 COVID-19 VIRUS INFECTION: Primary | ICD-10-CM

## 2022-05-13 PROCEDURE — 6360000200 HC RX 636 W HCPCS (ALT 250 FOR IP): Performed by: NURSE PRACTITIONER

## 2022-05-13 PROCEDURE — 96365 THER/PROPH/DIAG IV INF INIT: CPT

## 2022-05-13 RX ORDER — LEVOTHYROXINE SODIUM 125 UG/1
TABLET ORAL
Qty: 90 TABLET | Refills: 0 | Status: SHIPPED | OUTPATIENT
Start: 2022-05-13 | End: 2022-08-25 | Stop reason: SDUPTHER

## 2022-05-13 RX ADMIN — REMDESIVIR 100 MG: 100 INJECTION, POWDER, LYOPHILIZED, FOR SOLUTION INTRAVENOUS at 08:06

## 2022-05-13 NOTE — TELEPHONE ENCOUNTER
Medication refill request:  Medication(s):  Synghroid not filled due to (route to Nurse Pool) TSH abnormal or due, based on lab/chart review

## 2022-05-14 ENCOUNTER — HOSPITAL ENCOUNTER (OUTPATIENT)
Dept: INFUSION THERAPY | Facility: HOSPITAL | Age: 37
Discharge: HOME | End: 2022-05-14
Payer: COMMERCIAL

## 2022-05-14 VITALS
RESPIRATION RATE: 17 BRPM | DIASTOLIC BLOOD PRESSURE: 82 MMHG | OXYGEN SATURATION: 96 % | TEMPERATURE: 97.9 F | HEART RATE: 70 BPM | SYSTOLIC BLOOD PRESSURE: 112 MMHG

## 2022-05-14 DIAGNOSIS — U07.1 COVID-19 VIRUS INFECTION: Primary | ICD-10-CM

## 2022-05-14 PROCEDURE — 6360000200 HC RX 636 W HCPCS (ALT 250 FOR IP): Performed by: NURSE PRACTITIONER

## 2022-05-14 PROCEDURE — 96365 THER/PROPH/DIAG IV INF INIT: CPT

## 2022-05-14 RX ADMIN — REMDESIVIR 100 MG: 100 INJECTION, POWDER, LYOPHILIZED, FOR SOLUTION INTRAVENOUS at 08:09

## 2022-05-21 ENCOUNTER — HEALTH MAINTENANCE LETTER (OUTPATIENT)
Age: 37
End: 2022-05-21

## 2022-05-25 ENCOUNTER — TELEPHONE (OUTPATIENT)
Dept: INTERNAL MEDICINE | Facility: CLINIC | Age: 37
End: 2022-05-25
Payer: COMMERCIAL

## 2022-05-25 DIAGNOSIS — Z52.4 KIDNEY DONOR: ICD-10-CM

## 2022-05-25 DIAGNOSIS — E03.8 ADULT ONSET HYPOTHYROIDISM: Primary | ICD-10-CM

## 2022-05-25 NOTE — TELEPHONE ENCOUNTER
Needing orders for labs for a physical for the pt. She has labs on June 16th and then the physical on June 17th. Thank you!

## 2022-08-25 ENCOUNTER — TELEPHONE (OUTPATIENT)
Dept: INTERNAL MEDICINE | Facility: CLINIC | Age: 37
End: 2022-08-25
Payer: COMMERCIAL

## 2022-08-25 DIAGNOSIS — E03.8 ADULT ONSET HYPOTHYROIDISM: ICD-10-CM

## 2022-08-25 NOTE — TELEPHONE ENCOUNTER
Patient has contacted the pharmacy? Yes    Patient Advised:  out of refills    Name of Medications (Have patient read from the bottle or snip from medication list):      Are you having any trouble with the medication:   No    How many doses do you have left: (Multiple med requests; Report the doses of the medication with the least amount left) 10    Is the Diagnosis (DX) active? yes    Additional Information: Has a new patient/physical appt with Dr Randall on 10/26 but will be out before then, was hoping for a refill that will last until appt    Patient's preferred pharmacy has been noted and populated.    Is it okay that the nurse communicates your response through [employer]? No      Caller has been advised: Your request does not guarantee an immediate refill. Please allow up to 72 hours for completion.

## 2022-08-25 NOTE — TELEPHONE ENCOUNTER
Care Due:                  Date            Visit Type   Department     Provider  --------------------------------------------------------------------------------                              ESTABLISHED   [facility] INTER[facility]  Last Visit: 11-      PATIENT      MED            YANELI JOE  Next Visit: None Scheduled  None         None Found                                                            Last  Test          Frequency    Reason                     Performed    Due Date  --------------------------------------------------------------------------------  Office Visit  6 months...  ALPRAZolam...............  11- 05-    TSH.........  12 months..  Synthroid................  04- 04-    [employer] Embedded Care Gaps. Reference number: [id number]. 8/25/2022 2:59:24 PM SUJATA

## 2022-08-25 NOTE — TELEPHONE ENCOUNTER
Received voicemail from Sierra requesting a call back on who Christen thinks she should see. Please advise.

## 2022-08-27 NOTE — TELEPHONE ENCOUNTER
Medication refill request:  Medication(s):  levothyroxine not filled due to (route to Nurse Pool) Lab(s) abnormal or delinquent > 90 days

## 2022-08-29 DIAGNOSIS — E03.8 ADULT ONSET HYPOTHYROIDISM: ICD-10-CM

## 2022-08-29 RX ORDER — LEVOTHYROXINE SODIUM 125 UG/1
125 TABLET ORAL DAILY
Qty: 30 TABLET | Refills: 0 | Status: SHIPPED | OUTPATIENT
Start: 2022-08-29 | End: 2022-10-06

## 2022-08-29 NOTE — TELEPHONE ENCOUNTER
No new care gaps identified.  [facility] Embedded Care Gaps. Reference number: [id number]. 8/29/2022 2:08:35 PM SUJATA

## 2022-08-30 RX ORDER — LEVOTHYROXINE SODIUM 125 UG/1
TABLET ORAL
Qty: 90 TABLET | OUTPATIENT
Start: 2022-08-30

## 2022-08-30 NOTE — TELEPHONE ENCOUNTER
Medication refill request:  Medication(s):  synthroid not filled due to (route to Nurse Pool) Dosage clarification needed pt req 90-day refill

## 2022-09-01 ENCOUNTER — APPOINTMENT (OUTPATIENT)
Dept: LAB | Facility: CLINIC | Age: 37
End: 2022-09-01
Payer: COMMERCIAL

## 2022-09-01 DIAGNOSIS — E03.8 ADULT ONSET HYPOTHYROIDISM: ICD-10-CM

## 2022-09-01 DIAGNOSIS — Z52.4 KIDNEY DONOR: ICD-10-CM

## 2022-09-01 LAB
ALBUMIN SERPL-MCNC: 4.4 G/DL (ref 3.5–5.3)
ALP SERPL-CCNC: 60 U/L (ref 37–98)
ALT SERPL-CCNC: 14 U/L (ref 7–52)
ANION GAP SERPL CALC-SCNC: 11 MMOL/L (ref 3–11)
AST SERPL-CCNC: 24 U/L
BASOPHILS # BLD AUTO: 0 10*3/UL
BASOPHILS NFR BLD AUTO: 0.5 % (ref 0–2)
BILIRUB SERPL-MCNC: 0.42 MG/DL (ref 0.2–1.4)
BUN SERPL-MCNC: 17 MG/DL (ref 7–25)
CALCIUM ALBUM COR SERPL-MCNC: 9.5 MG/DL (ref 8.6–10.3)
CALCIUM SERPL-MCNC: 9.8 MG/DL (ref 8.6–10.3)
CHLORIDE SERPL-SCNC: 102 MMOL/L (ref 98–107)
CHOLEST SERPL-MCNC: 165 MG/DL (ref 0–199)
CO2 SERPL-SCNC: 26 MMOL/L (ref 21–32)
CREAT SERPL-MCNC: 1.07 MG/DL (ref 0.6–1.1)
CRP SERPL-MCNC: 3.7 MG/L
EOSINOPHIL # BLD AUTO: 0.1 10*3/UL
EOSINOPHIL NFR BLD AUTO: 0.8 % (ref 0–3)
ERYTHROCYTE [DISTWIDTH] IN BLOOD BY AUTOMATED COUNT: 12.8 % (ref 11.5–14)
FASTING STATUS PATIENT QL REPORTED: NO
GFR SERPL CREATININE-BSD FRML MDRD: 69 ML/MIN/1.73M*2
GLUCOSE SERPL-MCNC: 77 MG/DL (ref 70–105)
HCT VFR BLD AUTO: 39.4 % (ref 34–45)
HDLC SERPL-MCNC: 62 MG/DL
HGB BLD-MCNC: 13.2 G/DL (ref 11.5–15.5)
LDLC SERPL CALC-MCNC: 88 MG/DL (ref 20–99)
LYMPHOCYTES # BLD AUTO: 2.3 10*3/UL
LYMPHOCYTES NFR BLD AUTO: 25.7 % (ref 11–47)
MCH RBC QN AUTO: 31.1 PG (ref 28–33)
MCHC RBC AUTO-ENTMCNC: 33.5 G/DL (ref 32–36)
MCV RBC AUTO: 93 FL (ref 81–97)
MONOCYTES # BLD AUTO: 0.6 10*3/UL
MONOCYTES NFR BLD AUTO: 6.5 % (ref 3–11)
NEUTROPHILS # BLD AUTO: 6 10*3/UL
NEUTROPHILS NFR BLD AUTO: 66.5 % (ref 41–81)
PLATELET # BLD AUTO: 257 10*3/UL (ref 140–350)
PMV BLD AUTO: 7.4 FL (ref 6.9–10.8)
POTASSIUM SERPL-SCNC: 4.6 MMOL/L (ref 3.5–5.1)
PROT SERPL-MCNC: 7.4 G/DL (ref 6–8.3)
RBC # BLD AUTO: 4.24 10*6/ΜL (ref 3.7–5.3)
SODIUM SERPL-SCNC: 139 MMOL/L (ref 135–145)
TRIGL SERPL-MCNC: 76 MG/DL
TSH SERPL DL<=0.05 MIU/L-ACNC: 1.4 UIU/ML (ref 0.34–4.82)
WBC # BLD AUTO: 9 10*3/UL (ref 4.5–10.5)

## 2022-09-01 PROCEDURE — 84443 ASSAY THYROID STIM HORMONE: CPT | Performed by: NURSE PRACTITIONER

## 2022-09-01 PROCEDURE — 80061 LIPID PANEL: CPT | Performed by: NURSE PRACTITIONER

## 2022-09-01 PROCEDURE — 36415 COLL VENOUS BLD VENIPUNCTURE: CPT | Performed by: NURSE PRACTITIONER

## 2022-09-01 PROCEDURE — 80053 COMPREHEN METABOLIC PANEL: CPT | Performed by: NURSE PRACTITIONER

## 2022-09-01 PROCEDURE — 85025 COMPLETE CBC W/AUTO DIFF WBC: CPT | Performed by: NURSE PRACTITIONER

## 2022-09-01 PROCEDURE — 86140 C-REACTIVE PROTEIN: CPT | Performed by: NURSE PRACTITIONER

## 2022-09-17 ENCOUNTER — HEALTH MAINTENANCE LETTER (OUTPATIENT)
Age: 37
End: 2022-09-17

## 2022-10-04 DIAGNOSIS — F41.1 ANXIETY AS ACUTE REACTION TO EXCEPTIONAL STRESS: ICD-10-CM

## 2022-10-04 DIAGNOSIS — F43.0 ANXIETY AS ACUTE REACTION TO EXCEPTIONAL STRESS: ICD-10-CM

## 2022-10-04 DIAGNOSIS — E03.8 ADULT ONSET HYPOTHYROIDISM: ICD-10-CM

## 2022-10-04 NOTE — TELEPHONE ENCOUNTER
No new care gaps identified.  [facility] Embedded Care Gaps. Reference number: [id number]. 10/04/2022 2:38:49 PM SUJATA

## 2022-10-06 DIAGNOSIS — E03.8 ADULT ONSET HYPOTHYROIDISM: ICD-10-CM

## 2022-10-06 RX ORDER — ALPRAZOLAM 0.25 MG/1
TABLET ORAL
Qty: 30 TABLET | Refills: 0 | Status: SHIPPED | OUTPATIENT
Start: 2022-10-06 | End: 2022-10-06 | Stop reason: SDUPTHER

## 2022-10-06 RX ORDER — LEVOTHYROXINE SODIUM 125 UG/1
TABLET ORAL
Qty: 30 TABLET | Refills: 0 | Status: SHIPPED | OUTPATIENT
Start: 2022-10-06 | End: 2022-10-06 | Stop reason: SDUPTHER

## 2022-10-06 RX ORDER — LEVOTHYROXINE SODIUM 125 UG/1
TABLET ORAL
Qty: 90 TABLET | OUTPATIENT
Start: 2022-10-06

## 2022-10-06 RX ORDER — ALPRAZOLAM 0.25 MG/1
0.25 TABLET ORAL NIGHTLY PRN
Qty: 30 TABLET | Refills: 0 | Status: SHIPPED | OUTPATIENT
Start: 2022-10-06 | End: 2023-08-24 | Stop reason: SDUPTHER

## 2022-10-06 RX ORDER — LEVOTHYROXINE SODIUM 125 UG/1
125 TABLET ORAL DAILY
Qty: 30 TABLET | Refills: 0 | Status: SHIPPED | OUTPATIENT
Start: 2022-10-06 | End: 2022-10-14 | Stop reason: SDUPTHER

## 2022-10-06 NOTE — TELEPHONE ENCOUNTER
No new care gaps identified.  [facility] Embedded Care Gaps. Reference number: [id number]. 10/06/2022 9:05:01 AM SUJATA

## 2022-10-06 NOTE — TELEPHONE ENCOUNTER
Sent refills in for Xanax and Levothyroxine. Patient does need to set up an appointment to be seen for more refills., I have set an appointment up for the patient with Anaid for 10-14-22 at 930 am. Will notify patient.

## 2022-10-06 NOTE — TELEPHONE ENCOUNTER
Medication(s) levothyroxine refill refused due to DUPLICATE   Recently filled on 10/6; same pharmacy, receipt confirmed.  Dispense amount:  30, Refill: 0,  one month supply.  Refills or Rx should be available at preferred pharmacy.

## 2022-10-14 ENCOUNTER — OFFICE VISIT (OUTPATIENT)
Dept: INTERNAL MEDICINE | Facility: CLINIC | Age: 37
End: 2022-10-14
Payer: COMMERCIAL

## 2022-10-14 VITALS
WEIGHT: 218.6 LBS | HEART RATE: 62 BPM | RESPIRATION RATE: 19 BRPM | HEIGHT: 65 IN | BODY MASS INDEX: 36.42 KG/M2 | TEMPERATURE: 97.8 F | OXYGEN SATURATION: 95 % | SYSTOLIC BLOOD PRESSURE: 110 MMHG | DIASTOLIC BLOOD PRESSURE: 78 MMHG

## 2022-10-14 DIAGNOSIS — E03.8 OTHER SPECIFIED HYPOTHYROIDISM: ICD-10-CM

## 2022-10-14 DIAGNOSIS — Z52.4 KIDNEY DONOR: ICD-10-CM

## 2022-10-14 DIAGNOSIS — F32.89 OTHER DEPRESSION: ICD-10-CM

## 2022-10-14 DIAGNOSIS — Z76.89 ENCOUNTER TO ESTABLISH CARE: Primary | ICD-10-CM

## 2022-10-14 DIAGNOSIS — F41.9 ANXIETY: ICD-10-CM

## 2022-10-14 DIAGNOSIS — E03.8 ADULT ONSET HYPOTHYROIDISM: ICD-10-CM

## 2022-10-14 PROCEDURE — 99213 OFFICE O/P EST LOW 20 MIN: CPT

## 2022-10-14 RX ORDER — LEVOTHYROXINE SODIUM 125 UG/1
1 TABLET ORAL
COMMUNITY
End: 2022-10-14 | Stop reason: SDUPTHER

## 2022-10-14 RX ORDER — LEVOTHYROXINE SODIUM 125 UG/1
125 TABLET ORAL DAILY
Start: 2022-10-14 | End: 2022-11-09 | Stop reason: SDUPTHER

## 2022-10-14 ASSESSMENT — ENCOUNTER SYMPTOMS
MUSCULOSKELETAL NEGATIVE: 1
HEMATURIA: 0
DYSURIA: 0
ENDOCRINE NEGATIVE: 1
CARDIOVASCULAR NEGATIVE: 1
GASTROINTESTINAL NEGATIVE: 1
CONSTITUTIONAL NEGATIVE: 1
WHEEZING: 0
EYES NEGATIVE: 1
COUGH: 0
SINUS PAIN: 0
RESPIRATORY NEGATIVE: 1
BLOOD IN STOOL: 0
NEUROLOGICAL NEGATIVE: 1
VOMITING: 0
SINUS PRESSURE: 0
DIARRHEA: 0
NAUSEA: 0
ABDOMINAL PAIN: 0
CONSTIPATION: 0
PALPITATIONS: 0
SORE THROAT: 0
SHORTNESS OF BREATH: 0

## 2022-10-14 ASSESSMENT — PAIN SCALES - GENERAL: PAINLEVEL: 0-NO PAIN

## 2022-10-14 NOTE — PROGRESS NOTES
Subjective     HPI  37-year-old female presents to clinic to establish care.  She is a former patient of Christen Hicks CNP.    Medical history significant for hypothyroidism, depression, anxiety, and kidney donation.    She is currently taking Synthroid 125 MCG's daily.  Most recent TSH was from last month which was normal.  Reports partial thyroidectomy in  at age 18 due to cold nodule findings.  States at times she is still experiencing symptoms such as dry brittle nails and hair falling out.  She has felt constipated in the past with bowel movements every 3 days, however this has subsided and is now regular.  She is inquiring about seeing endocrinology in the future. Denies cardiac symptoms.    Reports mood is stable, sleeping well.  She is currently taking alprazolam 0.25 mg as needed at night for anxiety.  She takes this very infrequently.  She works as a  which can be stressful at times. She was started on Viibryd last year.  States she is currently taking 20 mg as opposed to 40 mg daily as she feels that her mood is better at the lower dose.  Tolerates medication well.  Denies thoughts of suicide, harming self/others.    She also likes to keep track of her kidney function.  She is a kidney donor.  This occurred in 2019 when she donated her kidney to her stepson.  Has been feeling well overall.  Denies urinary symptoms, avoids NSAIDs.    Regarding women's health needs, she follows with Dr. Chowdhury.  Denies gynecological concerns at this time.    Medications    Current Outpatient Medications:     levothyroxine (SYNTHROID, LEVOTHROID) 125 mcg tablet, 1 tablet, Disp: , Rfl:     ALPRAZolam (XANAX) 0.25 mg tablet, Take 1 tablet (0.25 mg total) by mouth nightly as needed for anxiety Max Daily Amount: 0.25 mg, Disp: 30 tablet, Rfl: 0    Viibryd 40 mg tablet, TAKE 1 TABLET(40 MG) BY MOUTH DAILY, Disp: 90 tablet, Rfl: 1    multivitamin with minerals tablet, Take 1 tablet by mouth daily, Disp: ,  "Rfl:     levothyroxine (SYNTHROID, LEVOTHROID) 125 mcg tablet, Take 1 tablet (125 mcg total) by mouth daily (Patient not taking: Reported on 10/14/2022), Disp: 30 tablet, Rfl: 0    Reviewed and updated with patient  Patient Active Problem List   Diagnosis    Other specified hypothyroidism    Depression    Anxiety    Kidney donor    COVID-19 virus infection    active problem list, medication list, allergies, family history, social history.     Review of Systems   Constitutional: Negative.    HENT: Negative.  Negative for sinus pressure, sinus pain and sore throat.    Eyes: Negative.    Respiratory: Negative.  Negative for cough, shortness of breath and wheezing.    Cardiovascular: Negative.  Negative for chest pain, palpitations and leg swelling.   Gastrointestinal: Negative.  Negative for abdominal pain, blood in stool, constipation, diarrhea, nausea and vomiting.   Endocrine: Negative.    Genitourinary: Negative.  Negative for dysuria, hematuria and pelvic pain.   Musculoskeletal: Negative.    Skin: Negative.    Neurological: Negative.    Psychiatric/Behavioral:          Intermittent anxiety, mood stable       Objective     Vital Signs  /78 (BP Location: Left arm, Patient Position: Sitting, Cuff Size: Long Adult)   Pulse 62   Temp 36.6 °C (97.8 °F) (Temporal)   Resp 19   Ht 1.651 m (5' 5\")   Wt 99.2 kg (218 lb 9.6 oz)   LMP 10/08/2022 (Exact Date)   SpO2 95%   BMI 36.38 kg/m²     No visits with results within 1 Month(s) from this visit.   Latest known visit with results is:   Appointment on 09/01/2022   Component Date Value Ref Range Status    Triglycerides 09/01/2022 76  <=149 mg/dL Final    Cholesterol 09/01/2022 165  0 - 199 mg/dL Final    HDL 09/01/2022 62  >=40 mg/dL Final    LDL Calculated 09/01/2022 88  20 - 99 mg/dL Final    Fasting? 09/01/2022 No   Final    TSH 09/01/2022 1.402  0.340 - 4.820 uIU/ml Final    WBC 09/01/2022 9.0  4.5 - 10.5 10*3/uL Final    RBC 09/01/2022 4.24  3.70 - 5.30 " 10*6/µL Final    Hemoglobin 09/01/2022 13.2  11.5 - 15.5 g/dL Final    Hematocrit 09/01/2022 39.4  34.0 - 45.0 % Final    MCV 09/01/2022 93.0  81.0 - 97.0 fL Final    MCH 09/01/2022 31.1  28.0 - 33.0 pg Final    MCHC 09/01/2022 33.5  32.0 - 36.0 g/dL Final    RDW 09/01/2022 12.8  11.5 - 14.0 % Final    Platelets 09/01/2022 257  140 - 350 10*3/uL Final    MPV 09/01/2022 7.4  6.9 - 10.8 fL Final    Neutrophils% 09/01/2022 66.5  41.0 - 81.0 % Final    Lymphocytes% 09/01/2022 25.7  11.0 - 47.0 % Final    Monocytes% 09/01/2022 6.5  3.0 - 11.0 % Final    Eosinophils% 09/01/2022 0.8  0.0 - 3.0 % Final    Basophils% 09/01/2022 0.5  0.0 - 2.0 % Final    ANC (auto diff) 09/01/2022 6.00  10*3/UL Final    Lymphocytes Absolute 09/01/2022 2.30  10*3/uL Final    Monocytes Absolute 09/01/2022 0.60  10*3/uL Final    Eosinophils Absolute 09/01/2022 0.10  10*3/uL Final    Basophils Absolute 09/01/2022 0.00  10*3/uL Final    Sodium 09/01/2022 139  135 - 145 mmol/L Final    Potassium 09/01/2022 4.6  3.5 - 5.1 MMOL/L Final    Chloride 09/01/2022 102  98 - 107 mmol/L Final    CO2 09/01/2022 26  21 - 32 mmol/L Final    Anion Gap 09/01/2022 11  3 - 11 mmol/L Final    BUN 09/01/2022 17  7 - 25 mg/dL Final    Creatinine 09/01/2022 1.07  0.60 - 1.10 mg/dL Final    Glucose 09/01/2022 77  70 - 105 mg/dL Final    Calcium 09/01/2022 9.8  8.6 - 10.3 mg/dL Final    AST 09/01/2022 24  <40 U/L Final    ALT (SGPT) 09/01/2022 14  7 - 52 U/L Final    Alkaline Phosphatase 09/01/2022 60  37 - 98 U/L Final    Total Protein 09/01/2022 7.4  6.0 - 8.3 g/dL Final    Albumin 09/01/2022 4.4  3.5 - 5.3 g/dL Final    Total Bilirubin 09/01/2022 0.42  0.20 - 1.40 mg/dL Final    Corrected Calcium 09/01/2022 9.5  8.6 - 10.3 mg/dL Final    eGFR 09/01/2022 69  >60 mL/min/1.73m*2 Final       Physical Exam  Vitals reviewed.   Constitutional:       General: She is not in acute distress.     Appearance: Normal appearance.   HENT:      Head: Normocephalic and atraumatic.       Right Ear: Tympanic membrane normal.      Left Ear: Tympanic membrane normal.      Mouth/Throat:      Mouth: Mucous membranes are moist.      Pharynx: Oropharynx is clear. No oropharyngeal exudate or posterior oropharyngeal erythema.   Eyes:      Conjunctiva/sclera: Conjunctivae normal.      Pupils: Pupils are equal, round, and reactive to light.   Cardiovascular:      Rate and Rhythm: Normal rate and regular rhythm.      Heart sounds: Normal heart sounds.   Pulmonary:      Effort: Pulmonary effort is normal. No respiratory distress.      Breath sounds: Normal breath sounds.   Abdominal:      General: Abdomen is flat. Bowel sounds are normal.      Palpations: Abdomen is soft. There is no mass.      Tenderness: There is no abdominal tenderness.   Musculoskeletal:      Cervical back: Neck supple. No tenderness.      Right lower leg: No edema.      Left lower leg: No edema.   Lymphadenopathy:      Cervical: No cervical adenopathy.   Neurological:      Mental Status: She is alert and oriented to person, place, and time.   Psychiatric:         Mood and Affect: Mood normal.         Behavior: Behavior normal.        Assessment/Plan   37-year-old female presents to clinic to establish care.  She is a former patient of Christen Hicks CNP.  She has a history of hypothyroidism due to thyroidectomy in 2003, depression and anxiety, and kidney donation in 2019.  Recent labs from 9/1/2022 within normal limits.  She has been feeling well overall, conditions stable, we will repeat labs in 1 year.    Offered annual influenza vaccination, she declined at this time.  She will get this at a later date.    Follow-up in clinic in 1 year, sooner if needed.    Diagnoses and all orders for this visit:    Encounter to establish care    Other specified hypothyroidism  -     CBC w/auto differential Blood, Venous; Future  -     Comprehensive metabolic panel Blood, Venous; Future  -     Thyroid Stimulating Hormone, Ultrasensitive Blood, Venous;  Future  -     Lipid panel Blood, Venous; Future    Other depression    Anxiety    Kidney donor  -     Comprehensive metabolic panel Blood, Venous; Future  -     Lipid panel Blood, Venous; Future      Plan    Establish care: Reviewed current medical, family, social, and surgical history.    Hypothyroidism: Continue current medication regimen.  When refilling her medication, she needs it to specify brand name Synthroid.  Repeat labs in 1 year.    Depression and anxiety: Mood stable.  Continue current medication regimen.    Kidney donor: Recent labs from 9/1/2022 reviewed, normal kidney function at this time.  Repeat labs in 1 year.      Follow-up in clinic in 1 year, sooner if needed.    Anaid Sanchez CNP    Portions of this record may have been created with voice recognition software. Care has been taken to prevent errors, however occasional wrong-word, sound-a-like substitutions or grammatical errors may have occurred due to the inherent limitations of voice recognition software.

## 2022-11-08 DIAGNOSIS — E03.8 ADULT ONSET HYPOTHYROIDISM: ICD-10-CM

## 2022-11-08 RX ORDER — LEVOTHYROXINE SODIUM 125 UG/1
125 TABLET ORAL DAILY
Status: CANCELLED | OUTPATIENT
Start: 2022-11-08

## 2022-11-09 RX ORDER — LEVOTHYROXINE SODIUM 125 UG/1
125 TABLET ORAL DAILY
Qty: 90 TABLET | Refills: 3 | Status: SHIPPED | OUTPATIENT
Start: 2022-11-09 | End: 2023-11-10

## 2022-11-10 DIAGNOSIS — R53.83 FATIGUE, UNSPECIFIED TYPE: ICD-10-CM

## 2022-11-10 NOTE — TELEPHONE ENCOUNTER
Care Due:                  Date            Visit Type   Department     Provider  --------------------------------------------------------------------------------                              ESTABLISHED   [facility]  Last Visit: 11-      PATIENT      MED            YANELI JOE  Next Visit: None Scheduled  None         None Found                                                            Last  Test          Frequency    Reason                     Performed    Due Date  --------------------------------------------------------------------------------  Office Visit  6 months...  ALPGermán Noble......  11- 05-    [facility] Embedded Care Gaps. Reference number: [id number]. 11/10/2022 12:54:29 PM MST

## 2022-11-11 RX ORDER — ERGOCALCIFEROL 1.25 MG/1
CAPSULE ORAL
Qty: 8 CAPSULE | Refills: 11 | OUTPATIENT
Start: 2022-11-11

## 2023-04-17 ENCOUNTER — PATIENT MESSAGE (OUTPATIENT)
Dept: INTERNAL MEDICINE | Facility: CLINIC | Age: 38
End: 2023-04-17
Payer: COMMERCIAL

## 2023-04-17 DIAGNOSIS — F32.89 OTHER DEPRESSION: Primary | ICD-10-CM

## 2023-04-17 RX ORDER — VILAZODONE HYDROCHLORIDE 20 MG/1
20 TABLET ORAL DAILY
Qty: 90 TABLET | Refills: 3 | Status: SHIPPED | OUTPATIENT
Start: 2023-04-17 | End: 2024-04-08

## 2023-04-28 ENCOUNTER — HOSPITAL ENCOUNTER (OUTPATIENT)
Dept: RADIOLOGY | Facility: HOSPITAL | Age: 38
Discharge: HOME | End: 2023-04-28
Payer: COMMERCIAL

## 2023-04-28 ENCOUNTER — OFFICE VISIT (OUTPATIENT)
Dept: SPORTS MEDICINE | Facility: CLINIC | Age: 38
End: 2023-04-28
Payer: COMMERCIAL

## 2023-04-28 VITALS
HEIGHT: 65 IN | SYSTOLIC BLOOD PRESSURE: 127 MMHG | HEART RATE: 70 BPM | BODY MASS INDEX: 36.32 KG/M2 | OXYGEN SATURATION: 98 % | DIASTOLIC BLOOD PRESSURE: 76 MMHG | WEIGHT: 218 LBS

## 2023-04-28 DIAGNOSIS — M25.571 ACUTE RIGHT ANKLE PAIN: Primary | ICD-10-CM

## 2023-04-28 DIAGNOSIS — S93.491A SPRAIN OF ANTERIOR TALOFIBULAR LIGAMENT OF RIGHT ANKLE, INITIAL ENCOUNTER: ICD-10-CM

## 2023-04-28 PROCEDURE — 99213 OFFICE O/P EST LOW 20 MIN: CPT

## 2023-04-28 PROCEDURE — 73610 X-RAY EXAM OF ANKLE: CPT | Mod: TC,RT

## 2023-04-28 ASSESSMENT — PAIN - FUNCTIONAL ASSESSMENT: PAIN_FUNCTIONAL_ASSESSMENT: 0-10

## 2023-04-28 ASSESSMENT — PAIN DESCRIPTION - DESCRIPTORS: DESCRIPTORS: SHOOTING

## 2023-04-28 NOTE — PATIENT INSTRUCTIONS
Boot while out and about. May ease out at home.    Gentle range of motion.  As your pain and swelling improves start the exercises.     You may take Tylenol (acetaminophen) 1000 mg (2 extra strength tablets) every 8 hours (3 times per day) as needed for pain.  Do not exceed 3000 mg per day.      Ice: Ice helps to keep swelling down, which relieves pain and pressure.  To ice your injury, simply wrap a bag of ice, a frozen gel pack, or a bag of frozen vegetables in a thin towel.  Do not apply the ice or ice pack directly to your skin.  Place it on the injured area and leave it in place for only 15 minutes.  Do this once every hour for 1 to 3 days.      Compression: Compression wraps are bandages that prevent swelling and help keep your injury stable. Compression is usually done with an elastic bandage (sometimes called an Ace bandage).     How to wrap an injury:   Begin wrapping the area at the point farthest away from the heart.  Wind the bandage firmly around the area, moving toward the heart.   The edges should overlap by about half the bandage to help provide even pressure.   Make sure the bandage stays flat and smoothe so that the skin doesn’t get irritated from bumps or wrinkles.   You want the the bandage to be snug, but not tight.  You should be able to slide your finger underneath the layers of the wrap.  Loosen the wrap if you feel pain, numbness, or tingling, or the skin around the area begins to turn pale or blue.     Elevation:  To help keep swelling down, elevate (raise) the injured body part above the level of your heart.  You can do this by placing pillows under the injured area until it is higher than your chest when lying down.  Try to do this as much as possible.

## 2023-04-28 NOTE — PROGRESS NOTES
"New Patient Consult Note  Chief Complaint:  Chief Complaint   Patient presents with    Right Ankle - Injury        Subjective    HPI/Subjective:    Sierra Kelley is a 38 y.o. female who presents through [facility] for evaluation of acute right ankle pain.  She explains she was walking her dogs yesterday, 4/27/2023, when she stepped on uneven sidewalk resulting in inversion of her right ankle.  She notes she fell to the ground and had difficulty getting up due to discomfort.  She adds she was able to walk back to her home as she was only a few houses down.  Today, she has discomfort with weight bearing and swelling over the lateral ankle.  Home treatments include ice, elevation, and Tylenol.  She avoids NSAIDs if possible as she is a kidney donor.  She purchased an OTC ankle brace though noted worsening swelling with this.     She denies previous injury or surgery to this extremity.    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    ROS:  Review of Systems   Constitutional:  Positive for activity change. Negative for chills, fatigue and fever.   Musculoskeletal:  Positive for arthralgias, gait problem, joint swelling and myalgias. Negative for back pain.   Skin:  Negative for color change and rash.   Neurological:  Negative for weakness and numbness.   Psychiatric/Behavioral:  Negative for sleep disturbance.       Vitals:    04/28/23 1441   BP: 127/76   Pulse: 70   SpO2: 98%   Height: 1.651 m (5' 5\")   Weight: 98.9 kg (218 lb)   Patient Position: Sitting     Past Surgical History:   Procedure Laterality Date    KIDNEY SURGERY  05/2019    kidney donation    THYROIDECTOMY  2003     Current Outpatient Medications   Medication Sig Dispense Refill    Viibryd 20 mg tablet Take 1 tablet (20 mg total) by mouth daily 90 tablet 3    Synthroid 125 mcg tablet Take 1 tablet (125 mcg total) by mouth daily 90 tablet 3    ALPRAZolam (XANAX) 0.25 mg tablet Take 1 tablet (0.25 mg total) by mouth nightly as needed for anxiety Max Daily " Amount: 0.25 mg 30 tablet 0    multivitamin with minerals tablet Take 1 tablet by mouth daily       No current facility-administered medications for this visit.       Objective    Physical Exam:  Constitutional:    Patient is awake, alert, oriented and is in no acute distress.    Right Ankle Exam     Muscle Strength   Dorsiflexion:  4/5  Plantar flexion:  4/5  Anterior tibial:  5/5  Posterior tibial:  5/5  Gastrocsoleus:  5/5  Peroneal muscle:  5/5    Tests   Anterior drawer: negative  Varus tilt: negative     Comments:  Skin is clean, dry, warm, and intact.  No ecchymosis or erythema.  Moderate swelling observed over lateral ankle. Tenderness over ATFL.  No tenderness over Achilles, medial malleolus, deltoid, proximal fibula, calf, midfoot, or 5th metatarsal.  Active eversion and inversion limited slightly due to discomfort and swelling. Florentino test negative.     Sensation intact throughout bilateral lower extremities.  Dorsalis pedis and posterior tibialis pulses present, 2+.  Cap refill less than 3 seconds, brisk.               Left Ankle Exam     Range of Motion   The patient has normal left ankle ROM.     Muscle Strength   Dorsiflexion:  5/5   Plantar flexion:  5/5   Anterior tibial:  5/5   Posterior tibial:  5/5  Gastrocsoleus:  5/5  Peroneal muscle:  5/5             Imaging:  X-ray ankle 3 or more views right    Result Date: 4/28/2023  Narrative: EXAM: DX ANKLE 3 OR MORE VW RIGHT DATE: 4/28/2023 2:54 PM INDICATION: Acute right ankle pain; Ankle pain  no prior imaging COMPARISON: None available. Findings: No acute fracture or dislocation. Ankle mortise is preserved. Distal syndesmosis is not widened. Plantar calcaneal enthesophyte. Mild soft tissue swelling.     Impression: IMPRESSION: No acute osseous abnormality.     Orders Placed This Encounter   Procedures    Walking boot     Tall walking boot  Size 9.5 womens     Order Specific Question:   Laterality:     Answer:   Right     Order Specific Question:    Duration of need     Answer:   99 ([employer])     Order Specific Question:   The face to face evaluation was performed on     Answer:   4/28/2023     Order Specific Question:   Diagnosis     Answer:   Ankle sprain [[id number]]     Order Specific Question:   Prognosis:     Answer:   Good     Order Specific Question:   Vendor list     Answer:   [facility] [place]    X-ray ankle 3 or more views right     AP LAT OBL, weight bearing if tolerated     Order Specific Question:   Is the patient pregnant?     Answer:   No     Order Specific Question:   Reading provider?     Answer:   Radiologist     Order Specific Question:   Radiologist's Resulting Workstation     Answer:   [id number]         Assessment    Assessment/Plan:  Diagnoses and all orders for this visit:    Acute right ankle pain  -     X-ray ankle 3 or more views right  -     Walking boot        Sierra Kelley is a 38 y.o. female who presents through [facility] for evaluation of acute right ankle pain; onset 4/27/2023 following inversion injury.     Plain film imaging was obtained today, read by radiology, and reviewed with patient. No acute osseous abnormality.  Discussed examination findings are consistent with ankle sprain.  We discussed anatomy and pathophysiology, anatomical illustrations were utilized for further demonstration.  Discussed expected recovery.  She was provided with a walking boot today which she tolerates well.  Crutches offered, though she does note feel she needs them at this time.  We discussed appropriate offloading.  She may come out of the boot at home.  Discussed weaning out of boot as discomfort and weight bearing at home improves.  Encouraged gentle range of motion and provided exercises to begin as pain improves.  Additional instructions were discussed and print out provided.  Follow up offered though she declined and will reach out with concerns or return for reevaluation.    Sierra Kelley was agreeable to plan of  care, verbalized understanding, and all questions/concerns were addressed.'    Return if symptoms worsen or fail to improve.     Dominic Eagle CNP  [facility]  [facility]

## 2023-04-30 ASSESSMENT — ENCOUNTER SYMPTOMS
CHILLS: 0
FATIGUE: 0
ARTHRALGIAS: 1
ACTIVITY CHANGE: 1
NUMBNESS: 0
COLOR CHANGE: 0
JOINT SWELLING: 1
FEVER: 0
MYALGIAS: 1
BACK PAIN: 0
WEAKNESS: 0
SLEEP DISTURBANCE: 0

## 2023-06-04 ENCOUNTER — HEALTH MAINTENANCE LETTER (OUTPATIENT)
Age: 38
End: 2023-06-04

## 2023-08-10 ENCOUNTER — TELEPHONE (OUTPATIENT)
Dept: CALL CENTER | Facility: HOSPITAL | Age: 38
End: 2023-08-10

## 2023-08-24 ENCOUNTER — OFFICE VISIT (OUTPATIENT)
Dept: INTERNAL MEDICINE | Facility: CLINIC | Age: 38
End: 2023-08-24
Payer: COMMERCIAL

## 2023-08-24 ENCOUNTER — LAB (OUTPATIENT)
Dept: LAB | Facility: CLINIC | Age: 38
End: 2023-08-24
Payer: COMMERCIAL

## 2023-08-24 VITALS
DIASTOLIC BLOOD PRESSURE: 80 MMHG | WEIGHT: 231 LBS | BODY MASS INDEX: 38.44 KG/M2 | TEMPERATURE: 97.7 F | SYSTOLIC BLOOD PRESSURE: 118 MMHG | OXYGEN SATURATION: 96 % | HEART RATE: 65 BPM

## 2023-08-24 DIAGNOSIS — Z52.4 KIDNEY DONOR: Primary | ICD-10-CM

## 2023-08-24 DIAGNOSIS — F32.89 OTHER DEPRESSION: ICD-10-CM

## 2023-08-24 DIAGNOSIS — F41.1 ANXIETY AS ACUTE REACTION TO EXCEPTIONAL STRESS: ICD-10-CM

## 2023-08-24 DIAGNOSIS — E03.9 HYPOTHYROIDISM, UNSPECIFIED TYPE: ICD-10-CM

## 2023-08-24 DIAGNOSIS — E03.8 OTHER SPECIFIED HYPOTHYROIDISM: ICD-10-CM

## 2023-08-24 DIAGNOSIS — E66.9 OBESITY (BMI 30-39.9): ICD-10-CM

## 2023-08-24 DIAGNOSIS — M72.2 PLANTAR FASCIITIS: ICD-10-CM

## 2023-08-24 DIAGNOSIS — F43.0 ANXIETY AS ACUTE REACTION TO EXCEPTIONAL STRESS: ICD-10-CM

## 2023-08-24 DIAGNOSIS — Z52.4 KIDNEY DONOR: ICD-10-CM

## 2023-08-24 LAB
ALBUMIN SERPL-MCNC: 4.1 G/DL (ref 3.5–5.3)
ALP SERPL-CCNC: 48 U/L (ref 37–98)
ALT SERPL-CCNC: 15 U/L (ref 7–52)
ANION GAP SERPL CALC-SCNC: 7 MMOL/L (ref 3–11)
AST SERPL-CCNC: 21 U/L
BASOPHILS # BLD AUTO: 0 10*3/UL
BASOPHILS NFR BLD AUTO: 0.2 % (ref 0–2)
BILIRUB SERPL-MCNC: 0.52 MG/DL (ref 0.2–1.4)
BUN SERPL-MCNC: 17 MG/DL (ref 7–25)
CALCIUM ALBUM COR SERPL-MCNC: 9.1 MG/DL (ref 8.6–10.3)
CALCIUM SERPL-MCNC: 9.2 MG/DL (ref 8.6–10.3)
CHLORIDE SERPL-SCNC: 106 MMOL/L (ref 98–107)
CHOLEST SERPL-MCNC: 159 MG/DL (ref 0–199)
CO2 SERPL-SCNC: 26 MMOL/L (ref 21–32)
CREAT SERPL-MCNC: 1.05 MG/DL (ref 0.6–1.1)
EGFRCR SERPLBLD CKD-EPI 2021: 70 ML/MIN/1.73M*2
EOSINOPHIL # BLD AUTO: 0.1 10*3/UL
EOSINOPHIL NFR BLD AUTO: 1.5 % (ref 0–3)
ERYTHROCYTE [DISTWIDTH] IN BLOOD BY AUTOMATED COUNT: 12.9 % (ref 11.5–14)
FASTING STATUS PATIENT QL REPORTED: YES
GLUCOSE SERPL-MCNC: 86 MG/DL (ref 70–105)
HCT VFR BLD AUTO: 37.8 % (ref 34–45)
HDLC SERPL-MCNC: 57 MG/DL
HGB BLD-MCNC: 12.7 G/DL (ref 11.5–15.5)
LDLC SERPL CALC-MCNC: 89 MG/DL (ref 20–99)
LYMPHOCYTES # BLD AUTO: 2.5 10*3/UL
LYMPHOCYTES NFR BLD AUTO: 32.2 % (ref 11–47)
MCH RBC QN AUTO: 31.2 PG (ref 28–33)
MCHC RBC AUTO-ENTMCNC: 33.7 G/DL (ref 32–36)
MCV RBC AUTO: 92.7 FL (ref 81–97)
MONOCYTES # BLD AUTO: 0.6 10*3/UL
MONOCYTES NFR BLD AUTO: 7.6 % (ref 3–11)
NEUTROPHILS # BLD AUTO: 4.6 10*3/UL
NEUTROPHILS NFR BLD AUTO: 58.5 % (ref 41–81)
PLATELET # BLD AUTO: 276 10*3/UL (ref 140–350)
PMV BLD AUTO: 7.2 FL (ref 6.9–10.8)
POTASSIUM SERPL-SCNC: 4.6 MMOL/L (ref 3.5–5.1)
PROT SERPL-MCNC: 7 G/DL (ref 6–8.3)
RBC # BLD AUTO: 4.08 10*6/ΜL (ref 3.7–5.3)
SODIUM SERPL-SCNC: 139 MMOL/L (ref 135–145)
TRIGL SERPL-MCNC: 63 MG/DL
TSH SERPL DL<=0.05 MIU/L-ACNC: 3.02 UIU/ML (ref 0.34–4.82)
WBC # BLD AUTO: 7.8 10*3/UL (ref 4.5–10.5)

## 2023-08-24 PROCEDURE — 80053 COMPREHEN METABOLIC PANEL: CPT

## 2023-08-24 PROCEDURE — 84443 ASSAY THYROID STIM HORMONE: CPT

## 2023-08-24 PROCEDURE — 36415 COLL VENOUS BLD VENIPUNCTURE: CPT

## 2023-08-24 PROCEDURE — 99214 OFFICE O/P EST MOD 30 MIN: CPT

## 2023-08-24 PROCEDURE — 80061 LIPID PANEL: CPT

## 2023-08-24 PROCEDURE — 85025 COMPLETE CBC W/AUTO DIFF WBC: CPT

## 2023-08-24 RX ORDER — ALPRAZOLAM 0.25 MG/1
0.25 TABLET ORAL NIGHTLY PRN
Qty: 30 TABLET | Refills: 0 | Status: SHIPPED | OUTPATIENT
Start: 2023-08-24 | End: 2024-09-10 | Stop reason: SDUPTHER

## 2023-08-24 ASSESSMENT — PAIN SCALES - GENERAL: PAINLEVEL: 4

## 2023-08-24 ASSESSMENT — ENCOUNTER SYMPTOMS
ARTHRALGIAS: 1
ABDOMINAL PAIN: 0
CONSTITUTIONAL NEGATIVE: 1
ENDOCRINE NEGATIVE: 1
DYSURIA: 0
SHORTNESS OF BREATH: 0
FEVER: 0
PSYCHIATRIC NEGATIVE: 1
COUGH: 0
GASTROINTESTINAL NEGATIVE: 1
CARDIOVASCULAR NEGATIVE: 1
CHILLS: 0
ROS GI COMMENTS: KIDNEY DONOR
WHEEZING: 0
RESPIRATORY NEGATIVE: 1
PALPITATIONS: 0
NEUROLOGICAL NEGATIVE: 1
BLOOD IN STOOL: 0

## 2023-08-24 NOTE — PROGRESS NOTES
Subjective     HPI  38-year-old female presents to clinic for right ankle injury follow-up.    She was evaluated [facility]/2023 for acute right ankle pain.  She injured her right ankle the day before while walking her dogs when she stepped on uneven sidewalk resulting in inversion of her right ankle.  Today she reports her ankle is feeling much better, however still has intermittent discomfort.  Reports her ankle is quite stiff in the morning but loosens up throughout the day.  States she was told she has a heel spur.  Feels that she has plantar fasciitis.  She is interested in visiting with podiatry.    History of hypothyroidism, currently taking brand-name Synthroid 125 mcg daily.  Has tolerated this well.  She has a history of thyroidectomy in 2003.  Denies chest pain, palpitations and shortness of breath.    She is a kidney donor, 5/2019.  Has been doing well.    History of anxiety and depression, currently taking Viibryd 20 mg daily.  She has Xanax that she takes sparingly.  Currently denies thoughts of suicide, harming self/others.    Reports she has had trouble with weight loss.  Feels that her ankle injury is a barrier to weight loss as she is not as physically active as she was previously.  She has tried the keto diet with a loss of 2 pounds over 2 months.  She would like to try intermittent fasting.    Denies personal family history of AAA.    Reports she is up-to-date regarding Pap test.  She is followed by [facility]necology at the [facility].  Believes her last Pap was last year.  Reports normal Paps in the past.  Denies vaginal concerns at this time.    States she is up-to-date regarding recommended immunizations.  Believes she is up-to-date with Tdap.    She is a non-smoker.    Medications    Current Outpatient Medications:     Viibryd 20 mg tablet, Take 1 tablet (20 mg total) by mouth daily, Disp: 90 tablet, Rfl: 3    Synthroid 125 mcg tablet, Take 1 tablet (125 mcg  total) by mouth daily, Disp: 90 tablet, Rfl: 3    ALPRAZolam (XANAX) 0.25 mg tablet, Take 1 tablet (0.25 mg total) by mouth nightly as needed for anxiety Max Daily Amount: 0.25 mg, Disp: 30 tablet, Rfl: 0    multivitamin with minerals tablet, Take 1 tablet by mouth daily, Disp: , Rfl:     Reviewed and updated with patient  Patient Active Problem List   Diagnosis    Other specified hypothyroidism    Depression    Anxiety    Kidney donor    COVID-19 virus infection    active problem list, medication list, allergies, family history, social history.     Review of Systems   Constitutional: Negative.  Negative for chills and fever.   HENT: Negative.     Respiratory: Negative.  Negative for cough, shortness of breath and wheezing.    Cardiovascular: Negative.  Negative for chest pain, palpitations and leg swelling.   Gastrointestinal: Negative.  Negative for abdominal pain and blood in stool.        Kidney donor   Endocrine: Negative.    Genitourinary: Negative.  Negative for dysuria.   Musculoskeletal:  Positive for arthralgias (Right ankle).   Skin: Negative.    Neurological: Negative.    Psychiatric/Behavioral: Negative.          Anxiety and depression       Objective     Vital Signs  /80 (BP Location: Left arm, Patient Position: Sitting, Cuff Size: Regular Adult)   Pulse 65   Temp 36.5 °C (97.7 °F) (Temporal)   Wt 104.8 kg (231 lb)   LMP 08/20/2023   SpO2 96%   BMI 38.44 kg/m²     Lab on 08/24/2023   Component Date Value Ref Range Status    WBC 08/24/2023 7.8  4.5 - 10.5 10*3/uL Final    RBC 08/24/2023 4.08  3.70 - 5.30 10*6/µL Final    Hemoglobin 08/24/2023 12.7  11.5 - 15.5 g/dL Final    Hematocrit 08/24/2023 37.8  34.0 - 45.0 % Final    MCV 08/24/2023 92.7  81.0 - 97.0 fL Final    MCH 08/24/2023 31.2  28.0 - 33.0 pg Final    MCHC 08/24/2023 33.7  32.0 - 36.0 g/dL Final    RDW 08/24/2023 12.9  11.5 - 14.0 % Final    Platelets 08/24/2023 276  140 - 350 10*3/uL Final    MPV 08/24/2023 7.2  6.9 - 10.8 fL  Final    Neutrophils% 08/24/2023 58.5  41.0 - 81.0 % Final    Lymphocytes% 08/24/2023 32.2  11.0 - 47.0 % Final    Monocytes% 08/24/2023 7.6  3.0 - 11.0 % Final    Eosinophils% 08/24/2023 1.5  0.0 - 3.0 % Final    Basophils% 08/24/2023 0.2  0.0 - 2.0 % Final    ANC (auto diff) 08/24/2023 4.60  10*3/UL Final    Lymphocytes Absolute 08/24/2023 2.50  10*3/uL Final    Monocytes Absolute 08/24/2023 0.60  10*3/uL Final    Eosinophils Absolute 08/24/2023 0.10  10*3/uL Final    Basophils Absolute 08/24/2023 0.00  10*3/uL Final       Physical Exam  Vitals reviewed.   Constitutional:       General: She is not in acute distress.     Appearance: Normal appearance.   HENT:      Head: Normocephalic and atraumatic.   Eyes:      Conjunctiva/sclera: Conjunctivae normal.      Pupils: Pupils are equal, round, and reactive to light.   Cardiovascular:      Rate and Rhythm: Normal rate and regular rhythm.      Heart sounds: Normal heart sounds.   Pulmonary:      Effort: Pulmonary effort is normal. No respiratory distress.      Breath sounds: Normal breath sounds.   Musculoskeletal:      Right lower leg: No edema.      Left lower leg: No edema.   Skin:     General: Skin is warm.      Capillary Refill: Capillary refill takes less than 2 seconds.   Neurological:      Mental Status: She is alert and oriented to person, place, and time.   Psychiatric:         Mood and Affect: Mood normal.          Assessment/Plan   38-year-old female presents to clinic for follow-up.  She reports she is feeling well overall and in her usual state of health aside from right ankle stiffness.  She injured this approximately 4 months ago and was evaluated at [facility].  Since then she has had stiffness of right ankle in the morning that subsides throughout the day.  Reports she has a bone spur as well as plantar fasciitis.  She is interested in visiting with podiatry.    She would like labs drawn today as she is a kidney donor and checks labs annually.   Routine labs previously ordered to include CBC, CMP, TSH and lipid panel.  We will contact patient with further recommendations pending results.    See plan below.    Diagnoses and all orders for this visit:    Kidney donor    Hypothyroidism, unspecified type    Anxiety as acute reaction to exceptional stress  -     ALPRAZolam (XANAX) 0.25 mg tablet; Take 1 tablet (0.25 mg total) by mouth nightly as needed for anxiety Max Daily Amount: 0.25 mg    Other depression    Obesity (BMI 30-39.9)    Plantar fasciitis  -     Ambulatory referral to Podiatry; Future    Plan    Kidney donor: Labs ordered as above.  We will contact patient with further recommendations pending results.  Discussed the importance of staying hydrated.    Hypothyroidism: TSH ordered.  She has been on brand-name Synthroid 125 mcg, tolerates medication well.    Anxiety and depression: Has been doing well on Viibryd 20 mg daily, she will continue this.  She is requesting refill of alprazolam, she takes this sparingly.    Obesity: States she has had trouble losing weight as of late.  Discussed plant-based diet.  She is interested in intermittent fasting and will give this a try.  Discussed staying hydrated.  She will contact clinic in the next couple of months if she does not feel she has been doing well with weight loss for referral to weight management.    Plantar fasciitis, right ankle injury: She is interested in podiatry referral.  Referral placed.    Should any alarming symptoms develop, she should report to the emergency room.    Follow-up in clinic in 1 year, with labs prior, sooner if needed.    Anaid Sanchez CNP    Portions of this record may have been created with voice recognition software. Care has been taken to prevent errors, however occasional wrong-word, sound-a-like substitutions or grammatical errors may have occurred due to the inherent limitations of voice recognition software.

## 2023-09-25 ENCOUNTER — TELEPHONE (OUTPATIENT)
Dept: SURGERY | Facility: CLINIC | Age: 38
End: 2023-09-25
Payer: COMMERCIAL

## 2023-09-25 NOTE — TELEPHONE ENCOUNTER
Caller would like to discuss appointment     Patient: Sierra Kelley    Callback Number: [phone], can leave a detail message on voice mail if unable to reach    Best Availability: anytime    Additional Info: schedule [facility] referral    I advised caller of a callback by 48 hours.

## 2023-10-19 NOTE — PROGRESS NOTES
Subjective      Sierra Kelley is a very pleasant 38 y.o. female who presents for evaluation of right plantar heel pain which is most notable upon weightbearing after long periods of reclining or sitting.  It is especially noticeable in the morning when they first get up out of bed.  The patient denies nausea vomiting fever chills.  They deny any recent trauma to the region.    The patient also presents with concerns of a recent ankle sprain which occurred on 4/27/2023 in which they slipped on uneven sidewalk.  They also recently rolled her ankle again on August 2023 which resulted in similar lateral ankle pain.  She has some concerns about persistent prominence over the lateral malleolus but states that they are generally no pain to the outside of the ankle.  The pain today 0 out of 10.  They would like to return to running and/or talking but they have been holding back due to concerns of her lateral ankle instability.    During intake, the patient's blood pressure was 122/89 with a pulse of 68.  They were encouraged to follow-up with the PCP for this issue.    HPI    The following have been reviewed and updated as appropriate in this visit:          No Known Allergies  Current Outpatient Medications   Medication Sig Dispense Refill    ALPRAZolam (XANAX) 0.25 mg tablet Take 1 tablet (0.25 mg total) by mouth nightly as needed for anxiety Max Daily Amount: 0.25 mg 30 tablet 0    Viibryd 20 mg tablet Take 1 tablet (20 mg total) by mouth daily 90 tablet 3    Synthroid 125 mcg tablet Take 1 tablet (125 mcg total) by mouth daily 90 tablet 3    multivitamin with minerals tablet Take 1 tablet by mouth daily       No current facility-administered medications for this visit.     Past Medical History:   Diagnosis Date    Anxiety     Depression     Hypothyroid      Past Surgical History:   Procedure Laterality Date    KIDNEY SURGERY  05/2019    kidney donation    THYROIDECTOMY  2003     Family History   Problem Relation Age of  "Onset    Alzheimer's disease Maternal Grandmother     Skin cancer Paternal Grandfather      Social History     Socioeconomic History    Marital status:     Years of education: 16   Occupational History    Occupation:    Tobacco Use    Smoking status: Never    Smokeless tobacco: Never   Vaping Use    Vaping Use: Never used   Substance and Sexual Activity    Alcohol use: Yes     Alcohol/week: 3.0 standard drinks of alcohol     Types: 3 Glasses of wine per week    Drug use: No    Sexual activity: Yes     Partners: Male     Birth control/protection: Male Sterilization     Social Determinants of Health     Tobacco Use: Low Risk  (10/20/2023)    Patient History     Smoking Tobacco Use: Never     Smokeless Tobacco Use: Never       Review of Systems    Objective   /89 (BP Location: Left arm, Patient Position: Sitting, Cuff Size: Long Adult) Comment: Note to check with PCP  Pulse 60   Temp 37.1 °C (98.8 °F) (Temporal)   Resp 17   Ht 1.651 m (5' 5\")   Wt 102.1 kg (225 lb)   SpO2 93%   BMI 37.44 kg/m²         PHYSICAL EXAM  Foot/Ankle Musculoskeletal Exam   Focused right lower extremity exam  DP PT pulses palpable  Exquisite tenderness palpation noted to plantar medial calcaneal tubercle of right heel  Dorsiflexion limited to less than 5 degrees at the ankle joint with knee flexed   Dorsiflexion limited to less than 5 degrees of ankle joint with knee extended  Negative Tinel's sign    Negative anterior drawer to right ankle  Negative tenderness palpation along the course the ATFL, CFL and peroneal tendons  No pain no weakness associated with persistent range of motion eversion of the right ankle    ASSESSMENT AND PLAN   Diagnoses and all orders for this visit:    Plantar fasciitis  -     Ambulatory referral to Podiatry      Radiographic and clinical findings discussed with the patient.  Low suspicion for chronic lateral ankle instability at this time.  The majority the patient's symptoms " seem to be associated with her Planter fasciitis of the right lower extremity.      Risks and benefits of conservative versus surgical management of the patient's right foot plantar fasciitis discussed  Stretching exercises, shoe wear strategies for conservative management advised.  Power step shoe inserts dispensed  Patient did decline prescription of any oral anti-inflammatories due to being a kidney donor  Night splints dispensed      Return to clinic as needed for interval evaluation    Lul Conway DPM

## 2023-10-20 ENCOUNTER — CONSULT (OUTPATIENT)
Dept: PODIATRY | Facility: CLINIC | Age: 38
End: 2023-10-20
Payer: COMMERCIAL

## 2023-10-20 VITALS
TEMPERATURE: 98.8 F | HEART RATE: 60 BPM | SYSTOLIC BLOOD PRESSURE: 122 MMHG | HEIGHT: 65 IN | OXYGEN SATURATION: 93 % | RESPIRATION RATE: 17 BRPM | BODY MASS INDEX: 37.49 KG/M2 | DIASTOLIC BLOOD PRESSURE: 89 MMHG | WEIGHT: 225 LBS

## 2023-10-20 DIAGNOSIS — M72.2 PLANTAR FASCIITIS: ICD-10-CM

## 2023-10-20 PROCEDURE — 99203 OFFICE O/P NEW LOW 30 MIN: CPT | Performed by: PODIATRIST

## 2023-10-20 ASSESSMENT — PAIN SCALES - GENERAL: PAINLEVEL: 0-NO PAIN

## 2023-11-10 DIAGNOSIS — E03.8 ADULT ONSET HYPOTHYROIDISM: ICD-10-CM

## 2023-11-10 RX ORDER — LEVOTHYROXINE SODIUM 125 UG/1
125 TABLET ORAL DAILY
Qty: 90 TABLET | Refills: 3 | Status: SHIPPED | OUTPATIENT
Start: 2023-11-10 | End: 2024-09-10 | Stop reason: SDUPTHER

## 2024-04-05 DIAGNOSIS — F32.89 OTHER DEPRESSION: ICD-10-CM

## 2024-04-05 NOTE — TELEPHONE ENCOUNTER
Medication refill request:  Medication(s):  vilazodone not filled due to Previous refill visit indicating specific care plan, please review for refill

## 2024-04-05 NOTE — TELEPHONE ENCOUNTER
Care Due:                  Date            Visit Type   Department     Provider  --------------------------------------------------------------------------------  Last Visit: None Found      None         None Found  Next Visit: None Scheduled  None         None Found                                                            Last  Test          Frequency    Reason                     Performed    Due Date  --------------------------------------------------------------------------------  Office Visit  12 months..  Synthroid................  Not Found    Overdue    [employer] Embedded Care Due Messages. Reference number: [id number]. 4/05/2024 7:26:13 AM SUJATA

## 2024-04-08 RX ORDER — VILAZODONE HYDROCHLORIDE 20 MG/1
20 TABLET ORAL DAILY
Qty: 90 TABLET | Refills: 1 | Status: SHIPPED
Start: 2024-04-08 | End: 2024-04-10 | Stop reason: SDUPTHER

## 2024-06-10 ENCOUNTER — LAB (OUTPATIENT)
Dept: LAB | Facility: CLINIC | Age: 39
End: 2024-06-10
Payer: COMMERCIAL

## 2024-06-10 DIAGNOSIS — N39.0 URINARY TRACT INFECTION WITHOUT HEMATURIA, SITE UNSPECIFIED: ICD-10-CM

## 2024-06-10 DIAGNOSIS — N30.01 ACUTE CYSTITIS WITH HEMATURIA: Primary | ICD-10-CM

## 2024-06-10 DIAGNOSIS — N39.0 URINARY TRACT INFECTION WITHOUT HEMATURIA, SITE UNSPECIFIED: Primary | ICD-10-CM

## 2024-06-10 LAB
BACTERIA #/AREA URNS HPF: ABNORMAL /HPF
BILIRUB UR QL: NEGATIVE
CLARITY UR: CLEAR
COLOR UR: YELLOW
GLUCOSE UR QL: NEGATIVE MG/DL
HGB UR QL: ABNORMAL
KETONES UR-MCNC: NEGATIVE MG/DL
LEUKOCYTE ESTERASE UR QL STRIP: ABNORMAL
NITRITE UR QL: NEGATIVE
PH UR: 6.5 PH
PROT UR STRIP-MCNC: NEGATIVE MG/DL
RBC #/AREA URNS HPF: ABNORMAL /HPF
SP GR UR: <=1.005 (ref 1–1.03)
SQUAMOUS #/AREA URNS HPF: ABNORMAL /HPF
UROBILINOGEN UR-MCNC: 0.2 MG/DL
WBC #/AREA URNS HPF: ABNORMAL /HPF

## 2024-06-10 PROCEDURE — 81001 URINALYSIS AUTO W/SCOPE: CPT

## 2024-06-10 PROCEDURE — 87186 SC STD MICRODIL/AGAR DIL: CPT

## 2024-06-10 PROCEDURE — 87088 URINE BACTERIA CULTURE: CPT

## 2024-06-10 PROCEDURE — 87077 CULTURE AEROBIC IDENTIFY: CPT

## 2024-06-10 RX ORDER — NITROFURANTOIN 25; 75 MG/1; MG/1
100 CAPSULE ORAL EVERY 12 HOURS
Qty: 10 CAPSULE | Refills: 0 | Status: SHIPPED | OUTPATIENT
Start: 2024-06-10 | End: 2024-06-15

## 2024-06-12 LAB — BACTERIA UR CULT: ABNORMAL

## 2024-07-13 ENCOUNTER — HEALTH MAINTENANCE LETTER (OUTPATIENT)
Age: 39
End: 2024-07-13

## 2024-09-05 DIAGNOSIS — Z00.00 MEDICARE ANNUAL WELLNESS VISIT, SUBSEQUENT: ICD-10-CM

## 2024-09-05 DIAGNOSIS — E03.9 HYPOTHYROIDISM, UNSPECIFIED TYPE: Primary | ICD-10-CM

## 2024-09-05 DIAGNOSIS — Z13.220 ENCOUNTER FOR LIPID SCREENING FOR CARDIOVASCULAR DISEASE: ICD-10-CM

## 2024-09-05 DIAGNOSIS — Z13.6 ENCOUNTER FOR LIPID SCREENING FOR CARDIOVASCULAR DISEASE: ICD-10-CM

## 2024-09-09 ENCOUNTER — LAB (OUTPATIENT)
Dept: LAB | Facility: CLINIC | Age: 39
End: 2024-09-09
Payer: COMMERCIAL

## 2024-09-09 DIAGNOSIS — E03.9 HYPOTHYROIDISM, UNSPECIFIED TYPE: ICD-10-CM

## 2024-09-09 DIAGNOSIS — Z13.6 ENCOUNTER FOR LIPID SCREENING FOR CARDIOVASCULAR DISEASE: ICD-10-CM

## 2024-09-09 DIAGNOSIS — Z00.00 MEDICARE ANNUAL WELLNESS VISIT, SUBSEQUENT: ICD-10-CM

## 2024-09-09 DIAGNOSIS — Z13.220 ENCOUNTER FOR LIPID SCREENING FOR CARDIOVASCULAR DISEASE: ICD-10-CM

## 2024-09-09 DIAGNOSIS — Z13.1 SCREENING FOR DIABETES MELLITUS: ICD-10-CM

## 2024-09-09 LAB
ALBUMIN SERPL-MCNC: 4.4 G/DL (ref 3.5–5.3)
ALP SERPL-CCNC: 52 U/L (ref 37–98)
ALT SERPL-CCNC: 11 U/L (ref 7–52)
ANION GAP SERPL CALC-SCNC: 8 MMOL/L (ref 3–11)
AST SERPL-CCNC: 18 U/L
BASOPHILS # BLD AUTO: 0 10*3/UL
BASOPHILS NFR BLD AUTO: 0.7 % (ref 0–2)
BILIRUB SERPL-MCNC: 0.65 MG/DL (ref 0.2–1.4)
BUN SERPL-MCNC: 14 MG/DL (ref 7–25)
CALCIUM ALBUM COR SERPL-MCNC: 9.1 MG/DL (ref 8.6–10.3)
CALCIUM SERPL-MCNC: 9.4 MG/DL (ref 8.6–10.3)
CHLORIDE SERPL-SCNC: 102 MMOL/L (ref 98–107)
CHOLEST SERPL-MCNC: 140 MG/DL (ref 0–199)
CO2 SERPL-SCNC: 25 MMOL/L (ref 21–32)
CREAT SERPL-MCNC: 1.02 MG/DL (ref 0.6–1.1)
EGFRCR SERPLBLD CKD-EPI 2021: 72 ML/MIN/1.73M*2
EOSINOPHIL # BLD AUTO: 0.1 10*3/UL
EOSINOPHIL NFR BLD AUTO: 1.3 % (ref 0–3)
ERYTHROCYTE [DISTWIDTH] IN BLOOD BY AUTOMATED COUNT: 13.3 % (ref 11.5–14)
EST. AVERAGE GLUCOSE BLD GHB EST-MCNC: 99.7 MG/DL
FASTING STATUS PATIENT QL REPORTED: YES
GLUCOSE SERPL-MCNC: 86 MG/DL (ref 70–105)
HBA1C MFR BLD: 5.1 % (ref 4–6)
HCT VFR BLD AUTO: 37.4 % (ref 34–45)
HDLC SERPL-MCNC: 51 MG/DL
HGB BLD-MCNC: 12.8 G/DL (ref 11.5–15.5)
LDLC SERPL CALC-MCNC: 80 MG/DL (ref 20–99)
LYMPHOCYTES # BLD AUTO: 2.1 10*3/UL
LYMPHOCYTES NFR BLD AUTO: 31.1 % (ref 11–47)
MCH RBC QN AUTO: 32.1 PG (ref 28–33)
MCHC RBC AUTO-ENTMCNC: 34.3 G/DL (ref 32–36)
MCV RBC AUTO: 93.6 FL (ref 81–97)
MONOCYTES # BLD AUTO: 0.5 10*3/UL
MONOCYTES NFR BLD AUTO: 7.4 % (ref 3–11)
NEUTROPHILS # BLD AUTO: 4 10*3/UL
NEUTROPHILS NFR BLD AUTO: 59.5 % (ref 41–81)
PLATELET # BLD AUTO: 273 10*3/UL (ref 140–350)
PMV BLD AUTO: 7.6 FL (ref 6.9–10.8)
POTASSIUM SERPL-SCNC: 4.5 MMOL/L (ref 3.5–5.1)
PROT SERPL-MCNC: 6.8 G/DL (ref 6–8.3)
RBC # BLD AUTO: 4 10*6/UL (ref 3.7–5.3)
SODIUM SERPL-SCNC: 135 MMOL/L (ref 135–145)
TRIGL SERPL-MCNC: 45 MG/DL
TSH SERPL DL<=0.05 MIU/L-ACNC: 1.1 UIU/ML (ref 0.34–4.82)
WBC # BLD AUTO: 6.8 10*3/UL (ref 4.5–10.5)

## 2024-09-09 PROCEDURE — 36415 COLL VENOUS BLD VENIPUNCTURE: CPT | Performed by: NURSE PRACTITIONER

## 2024-09-09 PROCEDURE — 85025 COMPLETE CBC W/AUTO DIFF WBC: CPT | Performed by: NURSE PRACTITIONER

## 2024-09-09 PROCEDURE — 83036 HEMOGLOBIN GLYCOSYLATED A1C: CPT

## 2024-09-09 PROCEDURE — 80053 COMPREHEN METABOLIC PANEL: CPT | Performed by: NURSE PRACTITIONER

## 2024-09-09 PROCEDURE — 80061 LIPID PANEL: CPT | Performed by: NURSE PRACTITIONER

## 2024-09-09 PROCEDURE — 84443 ASSAY THYROID STIM HORMONE: CPT | Performed by: NURSE PRACTITIONER

## 2024-09-10 ENCOUNTER — OFFICE VISIT (OUTPATIENT)
Dept: INTERNAL MEDICINE | Facility: CLINIC | Age: 39
End: 2024-09-10
Payer: COMMERCIAL

## 2024-09-10 VITALS
HEART RATE: 72 BPM | TEMPERATURE: 97.4 F | SYSTOLIC BLOOD PRESSURE: 128 MMHG | WEIGHT: 193.4 LBS | DIASTOLIC BLOOD PRESSURE: 78 MMHG | BODY MASS INDEX: 32.18 KG/M2 | OXYGEN SATURATION: 100 %

## 2024-09-10 DIAGNOSIS — F43.0 ANXIETY AS ACUTE REACTION TO EXCEPTIONAL STRESS: ICD-10-CM

## 2024-09-10 DIAGNOSIS — Z00.00 ANNUAL PHYSICAL EXAM: Primary | ICD-10-CM

## 2024-09-10 DIAGNOSIS — Z13.220 ENCOUNTER FOR LIPID SCREENING FOR CARDIOVASCULAR DISEASE: ICD-10-CM

## 2024-09-10 DIAGNOSIS — E03.8 ADULT ONSET HYPOTHYROIDISM: ICD-10-CM

## 2024-09-10 DIAGNOSIS — Z13.1 SCREENING FOR DIABETES MELLITUS: ICD-10-CM

## 2024-09-10 DIAGNOSIS — F41.1 ANXIETY AS ACUTE REACTION TO EXCEPTIONAL STRESS: ICD-10-CM

## 2024-09-10 DIAGNOSIS — Z13.6 ENCOUNTER FOR LIPID SCREENING FOR CARDIOVASCULAR DISEASE: ICD-10-CM

## 2024-09-10 DIAGNOSIS — J30.2 SEASONAL ALLERGIES: ICD-10-CM

## 2024-09-10 DIAGNOSIS — E66.811 OBESITY (BMI 30.0-34.9): ICD-10-CM

## 2024-09-10 DIAGNOSIS — M79.18 MUSCULOSKELETAL PAIN: ICD-10-CM

## 2024-09-10 DIAGNOSIS — E03.9 HYPOTHYROIDISM, UNSPECIFIED TYPE: ICD-10-CM

## 2024-09-10 PROCEDURE — 99395 PREV VISIT EST AGE 18-39: CPT

## 2024-09-10 RX ORDER — ALPRAZOLAM 0.25 MG/1
0.25 TABLET ORAL NIGHTLY PRN
Qty: 90 TABLET | Refills: 0 | Status: SHIPPED | OUTPATIENT
Start: 2024-09-10

## 2024-09-10 RX ORDER — PHENTERMINE HYDROCHLORIDE 30 MG/1
30 CAPSULE ORAL DAILY
COMMUNITY
Start: 2024-08-19

## 2024-09-10 RX ORDER — CYCLOBENZAPRINE HCL 10 MG
10 TABLET ORAL 3 TIMES DAILY PRN
Qty: 30 TABLET | Refills: 1 | Status: SHIPPED | OUTPATIENT
Start: 2024-09-10 | End: 2025-01-14 | Stop reason: ALTCHOICE

## 2024-09-10 RX ORDER — LEVOTHYROXINE SODIUM 125 UG/1
125 TABLET ORAL DAILY
Qty: 90 TABLET | Refills: 3 | Status: SHIPPED | OUTPATIENT
Start: 2024-09-10

## 2024-09-10 RX ORDER — ACETAMINOPHEN 500 MG
5 TABLET ORAL NIGHTLY PRN
COMMUNITY

## 2024-09-10 ASSESSMENT — PATIENT HEALTH QUESTIONNAIRE - PHQ9
6. FEELING BAD ABOUT YOURSELF - OR THAT YOU ARE A FAILURE OR HAVE LET YOURSELF OR YOUR FAMILY DOWN: SEVERAL DAYS
9. THOUGHTS THAT YOU WOULD BE BETTER OFF DEAD, OR OF HURTING YOURSELF: NOT AT ALL
5. POOR APPETITE OR OVEREATING: NOT AT ALL
3. TROUBLE FALLING OR STAYING ASLEEP: MORE THAN HALF THE DAYS
1. LITTLE INTEREST OR PLEASURE IN DOING THINGS: MORE THAN HALF THE DAYS
4. FEELING TIRED OR HAVING LITTLE ENERGY: MORE THAN HALF THE DAYS
7. TROUBLE CONCENTRATING ON THINGS, SUCH AS READING THE NEWSPAPER OR WATCHING TELEVISION: NOT AT ALL
2. FEELING DOWN, DEPRESSED OR HOPELESS: MORE THAN HALF THE DAYS
SUM OF ALL RESPONSES TO PHQ9 QUESTIONS 1 & 2: 4
SUM OF ALL RESPONSES TO PHQ QUESTIONS 1-9: 9
10. IF YOU CHECKED OFF ANY PROBLEMS, HOW DIFFICULT HAVE THESE PROBLEMS MADE IT FOR YOU TO DO YOUR WORK, TAKE CARE OF THINGS AT HOME, OR GET ALONG WITH OTHER PEOPLE: SOMEWHAT DIFFICULT

## 2024-09-10 ASSESSMENT — ENCOUNTER SYMPTOMS
ENDOCRINE NEGATIVE: 1
PSYCHIATRIC NEGATIVE: 1
MYALGIAS: 1
WHEEZING: 0
PALPITATIONS: 0
ABDOMINAL PAIN: 0
CARDIOVASCULAR NEGATIVE: 1
SHORTNESS OF BREATH: 0
DYSURIA: 0
GASTROINTESTINAL NEGATIVE: 1
BLOOD IN STOOL: 0
FEVER: 0
CONSTITUTIONAL NEGATIVE: 1
COUGH: 0
ARTHRALGIAS: 1
NEUROLOGICAL NEGATIVE: 1
CHILLS: 0
RESPIRATORY NEGATIVE: 1

## 2024-09-10 NOTE — PROGRESS NOTES
Subjective     HPI  39-year-old female presents to clinic for annual physical examination.    Past medical history significant for hypothyroidism, depression, anxiety, and kidney donation.    The patient, who has been managing their weight with the assistance of Dr. Dong, reports discontinuing antidepressant medication and starting phentermine. She has been on phentermine since the end of March and reports an average weight loss of 1.5 pounds per week. The patient also mentions a diagnosis of leaky gut syndrome, which was identified through a wheat zoom test. She has been following a gluten-free diet for three months, which has significantly reduced their inflammation. However, the diet has not resulted in weight loss.    The patient is currently training for a half marathon in October. However, she reports experiencing pain, which she believes is due to overexertion during strength training and sprint runs. She has been seeing a chiropractor for adjustments and has taken leftover muscle relaxers from a previous prescription to manage the pain. The patient also reports experiencing allergy-like symptoms after running, which she believes may be due to seasonal allergies. She has started taking Zyrtec occasionally and is considering taking it daily.    In terms of other medications, the patient is on Synthroid and alprazolam, the latter of which she takes sparingly as needed. She reports feeling irritable and getting angry quickly, which she believes the alprazolam may help manage.    The patient has a history of an ankle sprain, which occasionally causes swelling. She also has a history of Lasik eye surgery and has only one kidney, which she monitors closely. She reports no abdominal pain or blood in her stool, and her urination is normal. She has had a TB test and is up to date with her eye exams. She has also received both COVID-19 vaccine doses but got very sick from COVID-19 even after vaccination. She is  considering not getting the Covid shot this year due to her previous reactions.    She is a nonsmoker.       Medications    Current Outpatient Medications:     phentermine 30 mg capsule, Take 1 capsule (30 mg total) by mouth daily, Disp: , Rfl:     melatonin 5 mg tablet, Take 1 tablet (5 mg total) by mouth nightly as needed, Disp: , Rfl:     multivitamin with minerals tablet, Take 1 tablet by mouth daily, Disp: , Rfl:     Synthroid 125 mcg tablet, Take 1 tablet (125 mcg total) by mouth daily, Disp: 90 tablet, Rfl: 3    ALPRAZolam (XANAX) 0.25 mg tablet, Take 1 tablet (0.25 mg total) by mouth nightly as needed for anxiety Max Daily Amount: 0.25 mg, Disp: 90 tablet, Rfl: 0    cyclobenzaprine (FLEXERIL) 10 mg tablet, Take 1 tablet (10 mg total) by mouth 3 (three) times a day as needed for muscle spasms, Disp: 30 tablet, Rfl: 1    vilazodone (VIIBRYD) 20 mg tablet, Take 1 tablet (20 mg total) by mouth daily, Disp: 90 tablet, Rfl: 1    Reviewed and updated with patient  Patient Active Problem List   Diagnosis    Other specified hypothyroidism    Depression    Anxiety    Kidney donor    COVID-19 virus infection    active problem list, medication list, allergies, family history, social history.     Review of Systems   Constitutional: Negative.  Negative for chills and fever.   HENT: Negative.     Respiratory: Negative.  Negative for cough, shortness of breath and wheezing.    Cardiovascular: Negative.  Negative for chest pain, palpitations and leg swelling.   Gastrointestinal: Negative.  Negative for abdominal pain and blood in stool.   Endocrine: Negative.    Genitourinary: Negative.  Negative for dysuria.   Musculoskeletal:  Positive for arthralgias and myalgias.   Skin: Negative.    Neurological: Negative.    Psychiatric/Behavioral: Negative.         Objective     Vital Signs  /78 (BP Location: Right arm, Patient Position: Sitting, Cuff Size: Long Adult)   Pulse 72   Temp 36.3 °C (97.4 °F) (Temporal)   Wt 87.7  kg (193 lb 6.4 oz)   SpO2 100%   BMI 32.18 kg/m²     Lab on 09/09/2024   Component Date Value Ref Range Status    Hemoglobin A1C 09/09/2024 5.1  4.0 - 6.0 % Final    Estimated Average Glucose 09/09/2024 99.7  MG/DL Final    TSH 09/09/2024 1.099  0.340 - 4.820 uIU/ml Final    Sodium 09/09/2024 135  135 - 145 mmol/L Final    Potassium 09/09/2024 4.5  3.5 - 5.1 MMOL/L Final    Chloride 09/09/2024 102  98 - 107 mmol/L Final    CO2 09/09/2024 25  21 - 32 mmol/L Final    Anion Gap 09/09/2024 8  3 - 11 mmol/L Final    BUN 09/09/2024 14  7 - 25 mg/dL Final    Creatinine 09/09/2024 1.02  0.60 - 1.10 mg/dL Final    Glucose 09/09/2024 86  70 - 105 mg/dL Final    Calcium 09/09/2024 9.4  8.6 - 10.3 mg/dL Final    AST 09/09/2024 18  <40 U/L Final    ALT (SGPT) 09/09/2024 11  7 - 52 U/L Final    Alkaline Phosphatase 09/09/2024 52  37 - 98 U/L Final    Total Protein 09/09/2024 6.8  6.0 - 8.3 g/dL Final    Albumin 09/09/2024 4.4  3.5 - 5.3 g/dL Final    Total Bilirubin 09/09/2024 0.65  0.20 - 1.40 mg/dL Final    Corrected Calcium 09/09/2024 9.1  8.6 - 10.3 mg/dL Final    eGFR 09/09/2024 72  >60 mL/min/1.73m*2 Final    WBC 09/09/2024 6.8  4.5 - 10.5 10*3/uL Final    RBC 09/09/2024 4.00  3.70 - 5.30 10*6/uL Final    Hemoglobin 09/09/2024 12.8  11.5 - 15.5 g/dL Final    Hematocrit 09/09/2024 37.4  34.0 - 45.0 % Final    MCV 09/09/2024 93.6  81.0 - 97.0 fL Final    MCH 09/09/2024 32.1  28.0 - 33.0 pg Final    MCHC 09/09/2024 34.3  32.0 - 36.0 g/dL Final    RDW 09/09/2024 13.3  11.5 - 14.0 % Final    Platelets 09/09/2024 273  140 - 350 10*3/uL Final    MPV 09/09/2024 7.6  6.9 - 10.8 fL Final    Neutrophils% 09/09/2024 59.5  41.0 - 81.0 % Final    Lymphocytes% 09/09/2024 31.1  11.0 - 47.0 % Final    Monocytes% 09/09/2024 7.4  3.0 - 11.0 % Final    Eosinophils% 09/09/2024 1.3  0.0 - 3.0 % Final    Basophils% 09/09/2024 0.7  0.0 - 2.0 % Final    ANC (auto diff) 09/09/2024 4.00  10*3/uL Final    Lymphocytes Absolute 09/09/2024 2.10   10*3/uL Final    Monocytes Absolute 09/09/2024 0.50  10*3/uL Final    Eosinophils Absolute 09/09/2024 0.10  10*3/uL Final    Basophils Absolute 09/09/2024 0.00  10*3/uL Final    Triglycerides 09/09/2024 45  <=149 mg/dL Final    Cholesterol 09/09/2024 140  0 - 199 mg/dL Final    HDL 09/09/2024 51  >=40 mg/dL Final    LDL Calculated 09/09/2024 80  20 - 99 mg/dL Final    Fasting? 09/09/2024 Yes   Final       Physical Exam  Vitals reviewed.   Constitutional:       General: She is not in acute distress.     Appearance: Normal appearance.   HENT:      Head: Normocephalic and atraumatic.      Right Ear: Tympanic membrane normal.      Left Ear: Tympanic membrane normal.      Mouth/Throat:      Mouth: Mucous membranes are moist.      Pharynx: Oropharynx is clear. No oropharyngeal exudate or posterior oropharyngeal erythema.   Eyes:      Conjunctiva/sclera: Conjunctivae normal.      Pupils: Pupils are equal, round, and reactive to light.   Neck:      Vascular: No carotid bruit.   Cardiovascular:      Rate and Rhythm: Normal rate and regular rhythm.      Heart sounds: Normal heart sounds.   Pulmonary:      Effort: Pulmonary effort is normal. No respiratory distress.      Breath sounds: Normal breath sounds.   Abdominal:      General: Bowel sounds are normal.      Palpations: Abdomen is soft. There is no mass.      Tenderness: There is no abdominal tenderness.   Musculoskeletal:      Cervical back: Neck supple. No tenderness.      Right lower leg: No edema.      Left lower leg: No edema.   Lymphadenopathy:      Cervical: No cervical adenopathy.   Skin:     General: Skin is warm.   Neurological:      Mental Status: She is alert and oriented to person, place, and time.   Psychiatric:         Mood and Affect: Mood normal.          Assessment/Plan     Diagnoses and all orders for this visit:    Annual physical exam  -     CBC w/auto differential Blood, Venous; Future  -     Comprehensive metabolic panel Blood, Venous; Future  -      Hemoglobin A1c (glycosylated) Blood, Venous; Future  -     Lipid panel Blood, Venous; Future  -     Thyroid Stimulating Hormone, Ultrasensitive Blood, Venous; Future    Hypothyroidism, unspecified type  -     Thyroid Stimulating Hormone, Ultrasensitive Blood, Venous; Future  -     Comprehensive metabolic panel Blood, Venous; Future  -     CBC w/auto differential Blood, Venous; Future  -     Thyroid Stimulating Hormone, Ultrasensitive Blood, Venous; Future    Adult onset hypothyroidism  -     Synthroid 125 mcg tablet; Take 1 tablet (125 mcg total) by mouth daily    Anxiety as acute reaction to exceptional stress  -     ALPRAZolam (XANAX) 0.25 mg tablet; Take 1 tablet (0.25 mg total) by mouth nightly as needed for anxiety Max Daily Amount: 0.25 mg    Obesity (BMI 30.0-34.9)    Musculoskeletal pain  -     cyclobenzaprine (FLEXERIL) 10 mg tablet; Take 1 tablet (10 mg total) by mouth 3 (three) times a day as needed for muscle spasms    Encounter for lipid screening for cardiovascular disease  -     Lipid panel Blood, Venous; Future    Screening for diabetes mellitus  -     Hemoglobin A1c (glycosylated) Blood, Venous; Future    Seasonal allergies    Anxiety  Reports quick anger and irritability. Alprazolam used sparingly as needed.  -Refill Alprazolam for use as needed.    Hypothyroidism  Stable on Synthroid.  -Refill Synthroid.    Weight Management  On Phentermine 30mg since end of March with an average weight loss of 1.5 pounds per week.  Follows with Dr. Dong.  No significant weight loss with gluten-free diet, but noted improvement in inflammation.  -Continue Phentermine 30mg daily.  -Consider lifestyle medicine resources for additional dietary guidance.    Leaky Gut Syndrome  Improvement in inflammation with gluten-free diet.  -Continue gluten-free diet.    Musculoskeletal Pain  History of plantar fasciitis and tennis elbow. Currently experiencing sciatica likely due to overexertion in training for half marathon.  Previous relief with cyclobenzaprine.  -Refill cyclobenzaprine for use as needed.  -Consider physical therapy if pain persists.  -Use heat on back for relief.    Exercise-Induced Rhinitis  Reports sneezing and sinus drainage after running.  -Start daily Zyrtec.  -Consider adding Flonase if symptoms persist.    General Health Maintenance  -Continue annual physicals.  -Next mammogram due at age 41.  -Last PAP smear within the past 1-2 years, repeat in 5 years if normal.  -Ensure up-to-date on immunizations, including tetanus, diphtheria, pertussis, and COVID-19.  -Repeat labs at next annual physical.    Should any alarming symptoms develop, she should report to the emergency room.     Follow-up in clinic in 1 year for annual physical examination with fasting labs prior, sooner if needed.    Anaid Sanchez CNP    Portions of this record may have been created with voice recognition software as well as Mamie NEWBERRY  . Care has been taken to prevent errors, however occasional wrong-word, sound-a-like substitutions or grammatical errors may have occurred due to the inherent limitations of voice recognition software.

## 2025-01-14 ENCOUNTER — OFFICE VISIT (OUTPATIENT)
Dept: URGENT CARE | Facility: URGENT CARE | Age: 40
End: 2025-01-14
Payer: COMMERCIAL

## 2025-01-14 VITALS
TEMPERATURE: 98.1 F | WEIGHT: 176 LBS | BODY MASS INDEX: 29.29 KG/M2 | DIASTOLIC BLOOD PRESSURE: 79 MMHG | OXYGEN SATURATION: 97 % | SYSTOLIC BLOOD PRESSURE: 115 MMHG | HEART RATE: 71 BPM | RESPIRATION RATE: 18 BRPM

## 2025-01-14 DIAGNOSIS — R09.89 CHEST CONGESTION: Primary | ICD-10-CM

## 2025-01-14 PROCEDURE — 99203 OFFICE O/P NEW LOW 30 MIN: CPT | Performed by: PHYSICIAN ASSISTANT

## 2025-01-14 ASSESSMENT — PAIN SCALES - GENERAL: PAINLEVEL_OUTOF10: 0-NO PAIN

## 2025-01-14 NOTE — PROGRESS NOTES
Subjective      Sierra Kelley is a 39 y.o. female who presents for chest congestion.    History of Present Illness    The patient, with a recent history of upper respiratory symptoms including sore throat, fatigue, rhinorrhea, and sneezing, presents with new onset chest discomfort and a sensation of a foreign body in the throat. Last week she was sick for about three days and resolved before the onset of the current symptoms. The patient reports no fever but endorses a dull headache and significant fatigue. She expresses concern about mucus in the chest, although she has not expectorated any. The patient denies nasal congestion, except after sneezing episodes she does get rhinorrhea. She also reports a change in voice due to the current symptoms. The patient denies any gastrointestinal symptoms. She also mentions tender lymph nodes. She also expresses concern about potential exposure to respiratory syncytial virus (RSV) based on internet research. The patient has a history of exercise-induced rhinitis and recent onset allergies. She works as a  and has had previous exposure to COVID-19.           The following have been reviewed and updated as appropriate in this visit:  Past Medical, Surgical and Social History    No Known Allergies  Current Outpatient Medications   Medication Sig Dispense Refill    phentermine 30 mg capsule Take 1 capsule (30 mg total) by mouth daily      melatonin 5 mg tablet Take 1 tablet (5 mg total) by mouth nightly as needed      Synthroid 125 mcg tablet Take 1 tablet (125 mcg total) by mouth daily 90 tablet 3    ALPRAZolam (XANAX) 0.25 mg tablet Take 1 tablet (0.25 mg total) by mouth nightly as needed for anxiety Max Daily Amount: 0.25 mg 90 tablet 0    multivitamin with minerals tablet Take 1 tablet by mouth daily      vilazodone (VIIBRYD) 20 mg tablet Take 1 tablet (20 mg total) by mouth daily 90 tablet 1     No current facility-administered medications for this visit.        Review of Systems  As noted above in HPI.    Objective   /79 (BP Location: Left arm, Patient Position: Sitting, Cuff Size: Long Adult)   Pulse 71   Temp 36.7 °C (98.1 °F) (Temporal)   Resp 18   Wt 79.8 kg (176 lb)   SpO2 97%   BMI 29.29 kg/m²     Physical Exam  Vitals and nursing note reviewed.   Constitutional:       General: She is not in acute distress.     Appearance: Normal appearance.   HENT:      Head: Normocephalic.      Right Ear: Tympanic membrane, ear canal and external ear normal.      Left Ear: Tympanic membrane, ear canal and external ear normal.      Nose: Nose normal.      Mouth/Throat:      Mouth: Mucous membranes are moist.      Pharynx: No posterior oropharyngeal erythema.   Eyes:      Extraocular Movements: Extraocular movements intact.      Conjunctiva/sclera: Conjunctivae normal.   Cardiovascular:      Rate and Rhythm: Normal rate and regular rhythm.      Heart sounds: Normal heart sounds.   Pulmonary:      Effort: Pulmonary effort is normal.      Breath sounds: No wheezing, rhonchi or rales.   Skin:     General: Skin is warm and dry.   Neurological:      Mental Status: She is alert and oriented to person, place, and time.         LABS  No results found for this or any previous visit (from the past 2 hours).      Assessment/Plan   Diagnoses and all orders for this visit:    Chest congestion        Assessment/Plan     Upper Respiratory Infection  Recent history of sore throat, fatigue, runny nose, and sneezing that resolved. New onset of chest discomfort and sensation of something lodged in throat. No fever, nausea, vomiting, or diarrhea. Lungs clear on auscultation. Likely viral etiology, possibly a second viral infection following initial illness.  -Try Mucinex to help with mucus secretions and suppress cough reflex.  -Consider Sudafed or pseudoephedrine if congestion develops.  -Monitor symptoms and return for re-evaluation if fever persists beyond four days, shortness of  breath or chest pain develops, or if significant sinus pressure, congestion, headaches persist for ten days or longer.  -Optional testing for influenza A, B, COVID, and RSV if symptoms worsen or persist. She decided not to pursue testing today.  -All questions answered.  -Patient verbalized understanding and was in agreement with the plan.           LINDSAY Ulloa  January 14, 2025 2:24 PM

## 2025-03-04 ENCOUNTER — HOSPITAL ENCOUNTER (EMERGENCY)
Facility: HOSPITAL | Age: 40
Discharge: HOME | End: 2025-03-04
Attending: EMERGENCY MEDICINE
Payer: COMMERCIAL

## 2025-03-04 VITALS
OXYGEN SATURATION: 98 % | SYSTOLIC BLOOD PRESSURE: 118 MMHG | RESPIRATION RATE: 16 BRPM | HEIGHT: 65 IN | HEART RATE: 82 BPM | TEMPERATURE: 98.2 F | BODY MASS INDEX: 29.05 KG/M2 | DIASTOLIC BLOOD PRESSURE: 80 MMHG | WEIGHT: 174.38 LBS

## 2025-03-04 DIAGNOSIS — Z90.5 HISTORY OF LEFT NEPHRECTOMY: ICD-10-CM

## 2025-03-04 DIAGNOSIS — R11.0 NAUSEA: ICD-10-CM

## 2025-03-04 DIAGNOSIS — R10.9 RIGHT FLANK PAIN: Primary | ICD-10-CM

## 2025-03-04 LAB
ALBUMIN SERPL-MCNC: 4.6 G/DL (ref 3.5–5.3)
ALP SERPL-CCNC: 54 U/L (ref 37–98)
ALT SERPL-CCNC: 15 U/L (ref 7–52)
ANION GAP SERPL CALC-SCNC: 8 MMOL/L (ref 3–11)
AST SERPL-CCNC: 22 U/L
BACTERIA #/AREA URNS HPF: NORMAL /HPF
BASOPHILS # BLD AUTO: 0 10*3/UL
BASOPHILS NFR BLD AUTO: 0.3 % (ref 0–2)
BILIRUB SERPL-MCNC: 0.41 MG/DL (ref 0.2–1.4)
BILIRUB UR QL: NEGATIVE
BUN SERPL-MCNC: 17 MG/DL (ref 7–25)
CALCIUM ALBUM COR SERPL-MCNC: 9.1 MG/DL (ref 8.6–10.3)
CALCIUM SERPL-MCNC: 9.6 MG/DL (ref 8.6–10.3)
CHLORIDE SERPL-SCNC: 101 MMOL/L (ref 98–107)
CLARITY UR: CLEAR
CO2 SERPL-SCNC: 28 MMOL/L (ref 21–32)
COLOR UR: COLORLESS
CREAT SERPL-MCNC: 1.01 MG/DL (ref 0.6–1.1)
EGFRCR SERPLBLD CKD-EPI 2021: 73 ML/MIN/1.73M*2
EOSINOPHIL # BLD AUTO: 0.1 10*3/UL
EOSINOPHIL NFR BLD AUTO: 1.2 % (ref 0–3)
ERYTHROCYTE [DISTWIDTH] IN BLOOD BY AUTOMATED COUNT: 12.8 % (ref 11.5–14)
GLUCOSE SERPL-MCNC: 90 MG/DL (ref 70–105)
GLUCOSE UR QL: NEGATIVE MG/DL
HCG SERPL QL: POSITIVE
HCG SERPL-ACNC: 7 MIU/ML
HCT VFR BLD AUTO: 40.9 % (ref 34–45)
HGB BLD-MCNC: 13.7 G/DL (ref 11.5–15.5)
HGB UR QL: NEGATIVE
KETONES UR-MCNC: NEGATIVE MG/DL
LEUKOCYTE ESTERASE UR QL STRIP: NEGATIVE
LIPASE SERPL-CCNC: 24 U/L (ref 11–82)
LYMPHOCYTES # BLD AUTO: 1.2 10*3/UL
LYMPHOCYTES NFR BLD AUTO: 17.4 % (ref 11–47)
MCH RBC QN AUTO: 30.8 PG (ref 28–33)
MCHC RBC AUTO-ENTMCNC: 33.4 G/DL (ref 32–36)
MCV RBC AUTO: 92.3 FL (ref 81–97)
MONOCYTES # BLD AUTO: 0.5 10*3/UL
MONOCYTES NFR BLD AUTO: 7 % (ref 3–11)
NEUTROPHILS # BLD AUTO: 5.3 10*3/UL
NEUTROPHILS NFR BLD AUTO: 74.1 % (ref 41–81)
NITRITE UR QL: NEGATIVE
PH UR: 6.5 PH (ref 5–8)
PLATELET # BLD AUTO: 247 10*3/UL (ref 140–350)
PMV BLD AUTO: 7.8 FL (ref 6.9–10.8)
POTASSIUM SERPL-SCNC: 4 MMOL/L (ref 3.5–5.1)
PROT SERPL-MCNC: 7.3 G/DL (ref 6–8.3)
PROT UR STRIP-MCNC: NEGATIVE MG/DL
RBC # BLD AUTO: 4.44 10*6/UL (ref 3.7–5.3)
RBC #/AREA URNS HPF: NORMAL /HPF
SODIUM SERPL-SCNC: 137 MMOL/L (ref 135–145)
SP GR UR: 1 (ref 1–1.03)
SQUAMOUS #/AREA URNS HPF: NORMAL /HPF
UROBILINOGEN UR-MCNC: NORMAL MG/DL
WBC # BLD AUTO: 7.2 10*3/UL (ref 4.5–10.5)
WBC #/AREA URNS HPF: NORMAL /HPF
WBC CLUMPS #/AREA URNS HPF: NORMAL /HPF

## 2025-03-04 PROCEDURE — 85025 COMPLETE CBC W/AUTO DIFF WBC: CPT | Performed by: EMERGENCY MEDICINE

## 2025-03-04 PROCEDURE — 83690 ASSAY OF LIPASE: CPT | Performed by: EMERGENCY MEDICINE

## 2025-03-04 PROCEDURE — 84702 CHORIONIC GONADOTROPIN TEST: CPT | Performed by: EMERGENCY MEDICINE

## 2025-03-04 PROCEDURE — 81001 URINALYSIS AUTO W/SCOPE: CPT | Performed by: EMERGENCY MEDICINE

## 2025-03-04 PROCEDURE — 99283 EMERGENCY DEPT VISIT LOW MDM: CPT

## 2025-03-04 PROCEDURE — 36415 COLL VENOUS BLD VENIPUNCTURE: CPT | Performed by: EMERGENCY MEDICINE

## 2025-03-04 PROCEDURE — 80053 COMPREHEN METABOLIC PANEL: CPT | Performed by: EMERGENCY MEDICINE

## 2025-03-04 PROCEDURE — 84703 CHORIONIC GONADOTROPIN ASSAY: CPT | Performed by: EMERGENCY MEDICINE

## 2025-03-04 PROCEDURE — 99284 EMERGENCY DEPT VISIT MOD MDM: CPT | Performed by: EMERGENCY MEDICINE

## 2025-03-04 RX ORDER — ONDANSETRON HCL 4 MG
1 TABLET ORAL ONCE
Status: COMPLETED | OUTPATIENT
Start: 2025-03-04 | End: 2025-03-04

## 2025-03-04 RX ORDER — TRAMADOL HCL 50 MG
1 TABLET ORAL ONCE
Status: COMPLETED | OUTPATIENT
Start: 2025-03-04 | End: 2025-03-04

## 2025-03-04 RX ORDER — SODIUM CHLORIDE 9 MG/ML
25-50 INJECTION, SOLUTION INTRAVENOUS AS NEEDED
Status: DISCONTINUED | OUTPATIENT
Start: 2025-03-04 | End: 2025-03-04 | Stop reason: HOSPADM

## 2025-03-04 RX ADMIN — Medication 1 PACKAGE: at 16:15

## 2025-03-04 ASSESSMENT — ENCOUNTER SYMPTOMS
ABDOMINAL PAIN: 1
VOMITING: 0
FLANK PAIN: 1
NAUSEA: 1

## 2025-04-30 DIAGNOSIS — F41.1 ANXIETY AS ACUTE REACTION TO EXCEPTIONAL STRESS: ICD-10-CM

## 2025-04-30 DIAGNOSIS — F43.0 ANXIETY AS ACUTE REACTION TO EXCEPTIONAL STRESS: ICD-10-CM

## 2025-04-30 RX ORDER — ALPRAZOLAM 0.25 MG/1
TABLET ORAL
Qty: 90 TABLET | Refills: 1 | Status: SHIPPED | OUTPATIENT
Start: 2025-04-30

## 2025-04-30 NOTE — TELEPHONE ENCOUNTER
No care due was identified.  [facility] Embedded Care Due Messages. Reference number: [id number]. 4/30/2025 8:31:20 AM SUJATA

## 2025-04-30 NOTE — TELEPHONE ENCOUNTER
Alprazolam last prescribed by Valentina Sanchez CNP on 09/10/2024 #90 w/0RF. Last OV with Anaid Sanchez CNP was 09/10/2024. [facility]. Directions do say to take PRN, however it has been some time since it was refilled.

## 2025-04-30 NOTE — TELEPHONE ENCOUNTER
Medication refill request:  Medication(s):  alprazolam not filled due to Non-delegated medication - provider fill only

## (undated) DEVICE — SURGICEL ABSORBABLE HEMOSTAT SNOW 2"X4" 2082

## (undated) DEVICE — WIPES FOLEY CARE SURESTEP PROVON DFC100

## (undated) DEVICE — DRAPE IOBAN INCISE 23X17" 6650EZ

## (undated) DEVICE — Device

## (undated) DEVICE — SOL NACL 0.9% INJ 1000ML BAG 2B1324X

## (undated) DEVICE — DEVICE SUTURE PASSER 14GA WECK EFX EFXSP2

## (undated) DEVICE — SU VICRYL 3-0 CT-1 36" J344H

## (undated) DEVICE — PREP CHLORAPREP 26ML TINTED ORANGE  260815

## (undated) DEVICE — DRAPE SLUSH/WARMER 66X44" ORS-320

## (undated) DEVICE — SU PDS II 0 TP-1 60" Z991G

## (undated) DEVICE — SOL WATER IRRIG 1000ML BOTTLE 2F7114

## (undated) DEVICE — TUBING INSUFFLATION W/FILTER CPC TO LUER 620-030-301

## (undated) DEVICE — LINEN TOWEL PACK X6 WHITE 5487

## (undated) DEVICE — ANTIFOG SOLUTION W/FOAM PAD 31142527

## (undated) DEVICE — ENDO TROCAR FIRST ENTRY KII FIOS Z-THRD 05X100MM CTF03

## (undated) DEVICE — SU VICRYL 0 TIE 54" J608H

## (undated) DEVICE — CATH TRAY FOLEY SURESTEP 16FR W/URNE MTR STLK LATEX A303316A

## (undated) DEVICE — BASIN SET SINGLE STERILE 13752-624

## (undated) DEVICE — SU VICRYL 3-0 SH 27" J316H

## (undated) DEVICE — SU SILK 2-0 TIE 12X30" A305H

## (undated) DEVICE — TUBING IRRIG CYSTO/BLADDER SET 81" LF 2C4040

## (undated) DEVICE — SURGICEL ABSORBABLE HEMOSTAT SNOW 4"X4" 2083

## (undated) DEVICE — ENDO SCOPE WARMER SEAL  C3101

## (undated) DEVICE — LINEN TOWEL PACK X5 5464

## (undated) DEVICE — SUCTION MANIFOLD DORNOCH ULTRA CART UL-CL500

## (undated) DEVICE — ENDO TROCAR SLEEVE KII Z-THREADED 12X100MM CTS22

## (undated) DEVICE — SU SILK 4-0 TIE 12X30" A303H

## (undated) DEVICE — ESU HARMONIC LAPAROSCOPIC SHEARS HD 1000I 36CM HARHD36

## (undated) DEVICE — CLIP APPLIER ENDO ROTATING 10MM MED/LG ER320

## (undated) DEVICE — SU SILK 3-0 TIE 12X30" A304H

## (undated) DEVICE — CLIP ENDO HEMO-LOC PURPLE LG 544240

## (undated) DEVICE — SU MONOCRYL 4-0 PS-2 27" UND Y426H

## (undated) DEVICE — ESU ENDO SCISSORS 5MM CVD 5DCS

## (undated) DEVICE — DRAPE ISOLATION BAG 1003

## (undated) DEVICE — SUCTION IRR STRYKERFLOW II W/TIP 250-070-520

## (undated) DEVICE — STPL POWERED ECHELON VASC 35MM PVE35A

## (undated) DEVICE — ADH SKIN CLOSURE PREMIERPRO EXOFIN 1.0ML 3470

## (undated) DEVICE — LINEN GOWN XLG 5407

## (undated) DEVICE — BNDG ABDOMINAL BINDER 9X62-84" 79-89210

## (undated) DEVICE — ENDO TROCAR FIRST ENTRY KII FIOS Z-THRD 12X100MM CTF73

## (undated) RX ORDER — FENTANYL CITRATE 50 UG/ML
INJECTION, SOLUTION INTRAMUSCULAR; INTRAVENOUS
Status: DISPENSED
Start: 2019-05-22

## (undated) RX ORDER — PHENYLEPHRINE HCL IN 0.9% NACL 1 MG/10 ML
SYRINGE (ML) INTRAVENOUS
Status: DISPENSED
Start: 2019-05-22

## (undated) RX ORDER — GABAPENTIN 300 MG/1
CAPSULE ORAL
Status: DISPENSED
Start: 2019-05-22

## (undated) RX ORDER — HYDROMORPHONE HYDROCHLORIDE 1 MG/ML
INJECTION, SOLUTION INTRAMUSCULAR; INTRAVENOUS; SUBCUTANEOUS
Status: DISPENSED
Start: 2019-05-22

## (undated) RX ORDER — PROPOFOL 10 MG/ML
INJECTION, EMULSION INTRAVENOUS
Status: DISPENSED
Start: 2019-05-22

## (undated) RX ORDER — KETAMINE HCL IN 0.9 % NACL 50 MG/5 ML
SYRINGE (ML) INTRAVENOUS
Status: DISPENSED
Start: 2019-05-22

## (undated) RX ORDER — DEXTROSE, SODIUM CHLORIDE, SODIUM LACTATE, POTASSIUM CHLORIDE, AND CALCIUM CHLORIDE 5; .6; .31; .03; .02 G/100ML; G/100ML; G/100ML; G/100ML; G/100ML
INJECTION, SOLUTION INTRAVENOUS
Status: DISPENSED
Start: 2019-05-22

## (undated) RX ORDER — PROTAMINE SULFATE 10 MG/ML
INJECTION, SOLUTION INTRAVENOUS
Status: DISPENSED
Start: 2019-05-22

## (undated) RX ORDER — FUROSEMIDE 10 MG/ML
INJECTION INTRAMUSCULAR; INTRAVENOUS
Status: DISPENSED
Start: 2019-05-22

## (undated) RX ORDER — ACETAMINOPHEN 325 MG/1
TABLET ORAL
Status: DISPENSED
Start: 2019-05-22

## (undated) RX ORDER — HEPARIN SODIUM 1000 [USP'U]/ML
INJECTION, SOLUTION INTRAVENOUS; SUBCUTANEOUS
Status: DISPENSED
Start: 2019-05-22

## (undated) RX ORDER — MANNITOL 20 G/100ML
INJECTION, SOLUTION INTRAVENOUS
Status: DISPENSED
Start: 2019-05-22

## (undated) RX ORDER — CEFUROXIME SODIUM 1.5 G/16ML
INJECTION, POWDER, FOR SOLUTION INTRAVENOUS
Status: DISPENSED
Start: 2019-05-22